# Patient Record
Sex: FEMALE | Race: WHITE | NOT HISPANIC OR LATINO | Employment: FULL TIME | URBAN - METROPOLITAN AREA
[De-identification: names, ages, dates, MRNs, and addresses within clinical notes are randomized per-mention and may not be internally consistent; named-entity substitution may affect disease eponyms.]

---

## 2017-07-24 ENCOUNTER — TRANSCRIBE ORDERS (OUTPATIENT)
Dept: ADMINISTRATIVE | Facility: HOSPITAL | Age: 37
End: 2017-07-24

## 2017-07-24 DIAGNOSIS — Z12.31 ENCOUNTER FOR MAMMOGRAM TO ESTABLISH BASELINE MAMMOGRAM: Primary | ICD-10-CM

## 2017-07-27 ENCOUNTER — HOSPITAL ENCOUNTER (OUTPATIENT)
Dept: MAMMOGRAPHY | Facility: HOSPITAL | Age: 37
Discharge: HOME | End: 2017-07-27
Payer: COMMERCIAL

## 2017-07-27 DIAGNOSIS — Z12.31 ENCOUNTER FOR MAMMOGRAM TO ESTABLISH BASELINE MAMMOGRAM: ICD-10-CM

## 2017-07-27 PROCEDURE — 77063 BREAST TOMOSYNTHESIS BI: CPT

## 2017-07-27 PROCEDURE — G0202 SCR MAMMO BI INCL CAD: HCPCS

## 2018-04-08 LAB
BACTERIA UR QL AUTO: ABNORMAL
BILIRUB UR QL STRIP: NEGATIVE
CLARITY UR: ABNORMAL
COLOR UR: YELLOW
GLUCOSE UR STRIP-MCNC: NEGATIVE MG/DL
HGB UR QL STRIP.AUTO: NEGATIVE
INFLUENZA A (VIRAL ID) (HISTORICAL): NEGATIVE
INFLUENZA B (VIRAL ID) (HISTORICAL): NEGATIVE
KETONES UR STRIP-MCNC: ABNORMAL MG/DL
LEUKOCYTE ESTERASE UR QL STRIP: NEGATIVE
MUCUS THREADS (HISTORICAL): PRESENT /HPF
NITRITE UR QL STRIP: NEGATIVE
NON-SQ EPI CELLS URNS QL MICRO: ABNORMAL /HPF
PH UR STRIP.AUTO: 6 [PH] (ref 4.5–8)
PREGNANCY TEST URINE (HISTORICAL): NEGATIVE
PROT UR STRIP-MCNC: ABNORMAL MG/DL
RBC #/AREA URNS AUTO: ABNORMAL /HPF
S PYO AG THROAT QL: POSITIVE
SP GR UR STRIP.AUTO: <= 1.03 (ref 1–1.03)
SP GR UR STRIP.AUTO: >= 1.03 (ref 1–1.03)
UROBILINOGEN UR QL STRIP.AUTO: 1 EU/DL (ref 0.2–8)
WBC #/AREA URNS AUTO: ABNORMAL /HPF

## 2018-10-16 ENCOUNTER — TRANSCRIBE ORDERS (OUTPATIENT)
Dept: LAB | Facility: MEDICAL CENTER | Age: 38
End: 2018-10-16

## 2018-10-16 ENCOUNTER — APPOINTMENT (OUTPATIENT)
Dept: LAB | Facility: MEDICAL CENTER | Age: 38
End: 2018-10-16
Payer: COMMERCIAL

## 2018-10-16 DIAGNOSIS — R31.9 URINARY TRACT INFECTION WITH HEMATURIA, SITE UNSPECIFIED: Primary | ICD-10-CM

## 2018-10-16 DIAGNOSIS — N39.0 URINARY TRACT INFECTION WITH HEMATURIA, SITE UNSPECIFIED: Primary | ICD-10-CM

## 2018-10-16 DIAGNOSIS — R31.9 URINARY TRACT INFECTION WITH HEMATURIA, SITE UNSPECIFIED: ICD-10-CM

## 2018-10-16 DIAGNOSIS — L30.9 DERMATITIS: ICD-10-CM

## 2018-10-16 DIAGNOSIS — N39.0 URINARY TRACT INFECTION WITH HEMATURIA, SITE UNSPECIFIED: ICD-10-CM

## 2018-10-16 PROCEDURE — 87086 URINE CULTURE/COLONY COUNT: CPT

## 2018-10-17 LAB — BACTERIA UR CULT: NORMAL

## 2018-10-26 ENCOUNTER — TRANSCRIBE ORDERS (OUTPATIENT)
Dept: ADMINISTRATIVE | Facility: HOSPITAL | Age: 38
End: 2018-10-26

## 2018-10-26 DIAGNOSIS — Z12.39 SCREENING BREAST EXAMINATION: Primary | ICD-10-CM

## 2018-10-31 ENCOUNTER — HOSPITAL ENCOUNTER (OUTPATIENT)
Dept: MAMMOGRAPHY | Facility: HOSPITAL | Age: 38
Discharge: HOME | End: 2018-10-31
Payer: COMMERCIAL

## 2018-10-31 DIAGNOSIS — Z12.39 SCREENING BREAST EXAMINATION: ICD-10-CM

## 2018-10-31 PROCEDURE — 77063 BREAST TOMOSYNTHESIS BI: CPT

## 2018-10-31 PROCEDURE — 77067 SCR MAMMO BI INCL CAD: CPT

## 2018-12-14 ENCOUNTER — OFFICE VISIT (OUTPATIENT)
Dept: URGENT CARE | Facility: CLINIC | Age: 38
End: 2018-12-14
Payer: COMMERCIAL

## 2018-12-14 VITALS
OXYGEN SATURATION: 99 % | HEART RATE: 88 BPM | SYSTOLIC BLOOD PRESSURE: 118 MMHG | RESPIRATION RATE: 20 BRPM | HEIGHT: 60 IN | DIASTOLIC BLOOD PRESSURE: 76 MMHG | TEMPERATURE: 99.1 F | WEIGHT: 172 LBS | BODY MASS INDEX: 33.77 KG/M2

## 2018-12-14 DIAGNOSIS — M62.838 MUSCLE SPASM: ICD-10-CM

## 2018-12-14 DIAGNOSIS — J01.90 ACUTE NON-RECURRENT SINUSITIS, UNSPECIFIED LOCATION: Primary | ICD-10-CM

## 2018-12-14 PROCEDURE — 99203 OFFICE O/P NEW LOW 30 MIN: CPT | Performed by: PHYSICIAN ASSISTANT

## 2018-12-14 PROCEDURE — S9088 SERVICES PROVIDED IN URGENT: HCPCS | Performed by: PHYSICIAN ASSISTANT

## 2018-12-14 RX ORDER — AMOXICILLIN AND CLAVULANATE POTASSIUM 875; 125 MG/1; MG/1
1 TABLET, FILM COATED ORAL EVERY 12 HOURS SCHEDULED
Qty: 20 TABLET | Refills: 0 | Status: SHIPPED | OUTPATIENT
Start: 2018-12-14 | End: 2018-12-24

## 2018-12-14 RX ORDER — FLUCONAZOLE 150 MG/1
150 TABLET ORAL ONCE
Qty: 1 TABLET | Refills: 0 | Status: SHIPPED | OUTPATIENT
Start: 2018-12-14 | End: 2018-12-14

## 2018-12-14 RX ORDER — CYCLOBENZAPRINE HCL 10 MG
10 TABLET ORAL 3 TIMES DAILY PRN
Qty: 20 TABLET | Refills: 0 | Status: SHIPPED | OUTPATIENT
Start: 2018-12-14 | End: 2022-01-31 | Stop reason: ALTCHOICE

## 2018-12-14 RX ORDER — LISINOPRIL AND HYDROCHLOROTHIAZIDE 12.5; 1 MG/1; MG/1
1 TABLET ORAL DAILY
Status: ON HOLD | COMMUNITY
End: 2021-07-16 | Stop reason: ALTCHOICE

## 2018-12-14 NOTE — PATIENT INSTRUCTIONS
Muscle Spasm   WHAT YOU NEED TO KNOW:   A muscle spasm is a sudden contraction of any muscle or group of muscles  A muscle cramp is a painful muscle spasm  Muscle cramps commonly occur after intense exercise or during pregnancy  They may also be caused by certain medications, dehydration, low calcium or magnesium levels, or another medical condition  DISCHARGE INSTRUCTIONS:   Medicines: You may need the following:  · NSAIDs  help decrease swelling and pain or fever  This medicine is available with or without a doctor's order  NSAIDs can cause stomach bleeding or kidney problems in certain people  If you take blood thinner medicine, always ask your healthcare provider if NSAIDs are safe for you  Always read the medicine label and follow directions  · Take your medicine as directed  Contact your healthcare provider if you think your medicine is not helping or if you have side effects  Tell him of her if you are allergic to any medicine  Keep a list of the medicines, vitamins, and herbs you take  Include the amounts, and when and why you take them  Bring the list or the pill bottles to follow-up visits  Carry your medicine list with you in case of an emergency  Follow up with your healthcare provider as directed: You may need other tests or treatment  You may also be referred to a physical therapist or other specialist  Write down your questions so you remember to ask them during your visits  Self-care:   · Stretch  your muscle to help relieve the cramp  It may be helpful to keep your muscle in the stretched position until the cramp is gone  · Apply heat  to help decrease pain and muscle spasms  Apply heat on the area for 20 to 30 minutes every 2 hours for as many days as directed  · Apply ice  to help decrease swelling and pain  Ice may also help prevent tissue damage  Use an ice pack, or put crushed ice in a plastic bag   Cover it with a towel and place it on your muscle for 15 to 20 minutes every hour or as directed  · Drink more liquids  to help prevent muscle cramps caused by dehydration  Sports drinks may help replace electrolytes you lose through sweat during exercise  Ask your healthcare provider how much liquid to drink each day and which liquids are best for you  · Eat healthy foods , such as fruits, vegetables, whole grains, low-fat dairy products, and lean proteins (meat, beans, and fish)  If you are pregnant, ask your healthcare provider about foods that are high in magnesium and sodium  They may help to relieve cramps during pregnancy  · Massage your muscle  to help relieve the cramp  · Take frequent deep breaths  until the cramp feels better  Lie down while you take the deep breaths so you do not get dizzy or lightheaded  Contact your healthcare provider if:   · You have signs of dehydration, such as a headache, dark yellow urine, dry eyes or mouth, or a fast heartbeat  · You have questions or concerns about your condition or care  Return to the emergency department if:   · You have warmth, swelling, or redness in the cramping muscle  · You have frequent or unrelieved muscle cramps in several different muscles  · You have muscle cramps with numbness, tingling, and burning in your hands and feet  © 2017 [address] Information is for End User's use only and may not be sold, redistributed or otherwise used for commercial purposes  All illustrations and images included in CareNotes® are the copyrighted property of [employer]  or [employer]  The above information is an  only  It is not intended as medical advice for individual conditions or treatments  Talk to your doctor, nurse or pharmacist before following any medical regimen to see if it is safe and effective for you  Sinusitis   AMBULATORY CARE:   Sinusitis  is inflammation or infection of your sinuses  It is most often caused by a virus   Acute sinusitis may last up to 12 weeks  Chronic sinusitis lasts longer than 12 weeks  Recurrent sinusitis means you have 4 or more times in 1 year  Common symptoms include the following:   · Fever    · Pain, pressure, redness, or swelling around the forehead, cheeks, or eyes    · Thick yellow or green discharge from your nose    · Tenderness when you touch your face over your sinuses    · Dry cough that happens mostly at night or when you lie down    · Headache and face pain that is worse when you lean forward    · Tooth pain, or pain when you chew  Seek care immediately if:   · Your eye and eyelid are red, swollen, and painful  · You cannot open your eye  · You have vision changes, such as double vision  · Your eyeball bulges out or you cannot move your eye  · You are more sleepy than normal, or you notice changes in your ability to think, move, or talk  · You have a stiff neck, a fever, or a bad headache  · You have swelling of your forehead or scalp  Contact your healthcare provider if:   · Your symptoms do not improve after 3 days  · Your symptoms do not go away after 10 days  · You have nausea and are vomiting  · Your nose is bleeding  · You have questions or concerns about your condition or care  Treatment for sinusitis:  Your symptoms may go away on their own  Your healthcare provider may recommend watchful waiting for up to 10 days before starting antibiotics  You may  need any of the following:  · Acetaminophen  decreases pain and fever  It is available without a doctor's order  Ask how much to take and how often to take it  Follow directions  Read the labels of all other medicines you are using to see if they also contain acetaminophen, or ask your doctor or pharmacist  Acetaminophen can cause liver damage if not taken correctly  Do not use more than 4 grams (4,000 milligrams) total of acetaminophen in one day  · NSAIDs , such as ibuprofen, help decrease swelling, pain, and fever   This medicine is available with or without a doctor's order  NSAIDs can cause stomach bleeding or kidney problems in certain people  If you take blood thinner medicine, always ask your healthcare provider if NSAIDs are safe for you  Always read the medicine label and follow directions  · Nasal steroid sprays  may help decrease inflammation in your nose and sinuses  · Decongestants  help reduce swelling and drain mucus in the nose and sinuses  They may help you breathe easier  · Antihistamines  help dry mucus in the nose and relieve sneezing  · Antibiotics  help treat or prevent a bacterial infection  · Take your medicine as directed  Contact your healthcare provider if you think your medicine is not helping or if you have side effects  Tell him or her if you are allergic to any medicine  Keep a list of the medicines, vitamins, and herbs you take  Include the amounts, and when and why you take them  Bring the list or the pill bottles to follow-up visits  Carry your medicine list with you in case of an emergency  Self-care:   · Rinse your sinuses  Use a sinus rinse device to rinse your nasal passages with a saline (salt water) solution or distilled water  Do not use tap water  This will help thin the mucus in your nose and rinse away pollen and dirt  It will also help reduce swelling so you can breathe normally  Ask your healthcare provider how often to do this  · Breathe in steam   Heat a bowl of water until you see steam  Lean over the bowl and make a tent over your head with a large towel  Breathe deeply for about 20 minutes  Be careful not to get too close to the steam or burn yourself  Do this 3 times a day  You can also breathe deeply when you take a hot shower  · Sleep with your head elevated  Place an extra pillow under your head before you go to sleep to help your sinuses drain  · Drink liquids as directed    Ask your healthcare provider how much liquid to drink each day and which liquids are best for you  Liquids will thin the mucus in your nose and help it drain  Avoid drinks that contain alcohol or caffeine  · Do not smoke, and avoid secondhand smoke  Nicotine and other chemicals in cigarettes and cigars can make your symptoms worse  Ask your healthcare provider for information if you currently smoke and need help to quit  E-cigarettes or smokeless tobacco still contain nicotine  Talk to your healthcare provider before you use these products  Prevent the spread of germs that cause sinusitis:  Wash your hands often with soap and water  Wash your hands after you use the bathroom, change a child's diaper, or sneeze  Wash your hands before you prepare or eat food  Follow up with your healthcare provider as directed: You may be referred to an ear, nose, and throat specialist  Write down your questions so you remember to ask them during your visits  © 2017 [address] [facility] Information is for End User's use only and may not be sold, redistributed or otherwise used for commercial purposes  All illustrations and images included in CareNotes® are the copyrighted property of [employer]  or [employer]  The above information is an  only  It is not intended as medical advice for individual conditions or treatments  Talk to your doctor, nurse or pharmacist before following any medical regimen to see if it is safe and effective for you
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
## 2018-12-29 ENCOUNTER — OFFICE VISIT (OUTPATIENT)
Dept: URGENT CARE | Facility: CLINIC | Age: 38
End: 2018-12-29
Payer: COMMERCIAL

## 2018-12-29 VITALS
HEART RATE: 108 BPM | SYSTOLIC BLOOD PRESSURE: 139 MMHG | DIASTOLIC BLOOD PRESSURE: 89 MMHG | OXYGEN SATURATION: 98 % | TEMPERATURE: 97 F | RESPIRATION RATE: 16 BRPM

## 2018-12-29 DIAGNOSIS — J06.9 VIRAL URI: Primary | ICD-10-CM

## 2018-12-29 DIAGNOSIS — J02.9 SORE THROAT: ICD-10-CM

## 2018-12-29 LAB — S PYO AG THROAT QL: NEGATIVE

## 2018-12-29 PROCEDURE — 87430 STREP A AG IA: CPT | Performed by: PHYSICIAN ASSISTANT

## 2018-12-29 PROCEDURE — 87070 CULTURE OTHR SPECIMN AEROBIC: CPT | Performed by: PHYSICIAN ASSISTANT

## 2018-12-29 PROCEDURE — 99213 OFFICE O/P EST LOW 20 MIN: CPT | Performed by: PHYSICIAN ASSISTANT

## 2018-12-29 PROCEDURE — S9088 SERVICES PROVIDED IN URGENT: HCPCS | Performed by: PHYSICIAN ASSISTANT

## 2018-12-29 NOTE — PROGRESS NOTES
[facility]    NAME: Kevyn Murphy is a 34 y o  female  : 1980    MRN: [id number]  DATE: 2018  TIME: 9:49 AM    Assessment and Plan   Viral URI [J06 9]  1  Viral URI     2  Sore throat  POCT rapid strepA    Throat culture       Patient Instructions   Patient Instructions   Infection appears viral   Recommend symptomatic treatment  Can take ibuprofen or tylenol as needed for pain or fever  Over the counter cough and cold medications to help with symptoms  Use salt water gargles for sore throat and throat lozenges  Cough drops as needed  Wash hands frequently to prevent the spread of infection  If not improving over the next 7-10 days, follow up with PCP  Symptoms may persist for 10-14 days  Rapid strep was negative  Will send out throat culture for further testing  If positive will treat  Chief Complaint     Chief Complaint   Patient presents with    Cold Like Symptoms       History of Present Illness   66-year-old female here with complaint of sore throat  Patient started with cold symptoms 3-4 days ago  Reports with a little bit of nasal congestion cough  Has some pain her chest the area  Had a fever initially which has subsided with symptomatic treatment  Cough is productive  Started with neck pain yesterday as well in the posterior aspect of her neck  Review of Systems   Review of Systems   Constitutional: Positive for fever  Negative for activity change, appetite change, chills, diaphoresis, fatigue and unexpected weight change  HENT: Positive for sore throat  Negative for congestion, dental problem, hearing loss, sinus pressure, sneezing, tinnitus, trouble swallowing and voice change  Eyes: Negative for photophobia, redness and visual disturbance  Respiratory: Positive for cough  Negative for apnea, chest tightness, shortness of breath, wheezing and stridor  Cardiovascular: Negative for chest pain, palpitations and leg swelling  Gastrointestinal: Negative for abdominal distention, abdominal pain, blood in stool, constipation, diarrhea, nausea and vomiting  Endocrine: Negative for cold intolerance, heat intolerance, polydipsia, polyphagia and polyuria  Genitourinary: Negative for difficulty urinating, dysuria, flank pain, frequency, hematuria and urgency  Musculoskeletal: Positive for neck pain  Negative for arthralgias, back pain, gait problem, joint swelling, myalgias and neck stiffness  Skin: Negative for pallor, rash and wound  Neurological: Negative for dizziness, tremors, seizures, speech difficulty, weakness and headaches  Hematological: Negative for adenopathy  Does not bruise/bleed easily  Psychiatric/Behavioral: Negative for agitation, confusion, dysphoric mood and sleep disturbance  The patient is not nervous/anxious  All other systems reviewed and are negative  Current Medications     Current Outpatient Prescriptions:     cyclobenzaprine (FLEXERIL) 10 mg tablet, Take 1 tablet (10 mg total) by mouth 3 (three) times a day as needed for muscle spasms, Disp: 20 tablet, Rfl: 0    lisinopril-hydrochlorothiazide (PRINZIDE,ZESTORETIC) 10-12 5 MG per tablet, Take 1 tablet by mouth daily, Disp: , Rfl:     norgestrel-ethinyl estradiol (LO/OVRAL) 0 3 mg-30 mcg per tablet, Take 1 tablet by mouth daily, Disp: , Rfl:     Current Allergies     Allergies as of 12/29/2018    (No Known Allergies)          The following portions of the patient's history were reviewed and updated as appropriate: allergies, current medications, past family history, past medical history, past social history, past surgical history and problem list    Past Medical History:   Diagnosis Date    Hypertension      Past Surgical History:   Procedure Laterality Date    CHOLECYSTECTOMY      TONSILLECTOMY       No family history on file    Social History     Social History    Marital status: /Civil Union     Spouse name: N/A    Number of children: N/A    Years of education: N/A     Occupational History    Not on file  Social History Main Topics    Smoking status: Never Smoker    Smokeless tobacco: Never Used    Alcohol use Not on file    Drug use: Unknown    Sexual activity: Not on file     Other Topics Concern    Not on file     Social History Narrative    No narrative on file     Medications have been verified  Objective   /89   Pulse (!) 108   Temp (!) 97 °F (36 1 °C)   Resp 16   SpO2 98%      Physical Exam   Physical Exam   Constitutional: She appears well-developed and well-nourished  No distress  HENT:   Head: Normocephalic  Right Ear: Tympanic membrane and external ear normal    Left Ear: Tympanic membrane and external ear normal    Nose: Mucosal edema present  Mouth/Throat: Posterior oropharyngeal erythema present  No oropharyngeal exudate  Neck: Normal range of motion  Neck supple  Muscular tenderness present  No neck rigidity  Normal range of motion present  Mild tenderness with palpation of insertion of trapezius at occiput  Cardiovascular: Normal rate, regular rhythm and normal heart sounds  No murmur heard  Pulmonary/Chest: Effort normal and breath sounds normal  No respiratory distress  She has no wheezes  She has no rales  Abdominal: Soft  Bowel sounds are normal  There is no tenderness  Musculoskeletal: Normal range of motion  Lymphadenopathy:     She has no cervical adenopathy  Skin: Skin is warm  No rash noted  Nursing note and vitals reviewed

## 2018-12-29 NOTE — PATIENT INSTRUCTIONS
Infection appears viral   Recommend symptomatic treatment  Can take ibuprofen or tylenol as needed for pain or fever  Over the counter cough and cold medications to help with symptoms  Use salt water gargles for sore throat and throat lozenges  Cough drops as needed  Wash hands frequently to prevent the spread of infection  If not improving over the next 7-10 days, follow up with PCP  Symptoms may persist for 10-14 days  Rapid strep was negative  Will send out throat culture for further testing  If positive will treat

## 2018-12-31 LAB — BACTERIA THROAT CULT: NORMAL

## 2019-04-12 ENCOUNTER — APPOINTMENT (OUTPATIENT)
Dept: LAB | Facility: MEDICAL CENTER | Age: 39
End: 2019-04-12
Payer: COMMERCIAL

## 2019-04-12 ENCOUNTER — TRANSCRIBE ORDERS (OUTPATIENT)
Dept: LAB | Facility: MEDICAL CENTER | Age: 39
End: 2019-04-12

## 2019-04-12 DIAGNOSIS — M19.90 ARTHRITIS: ICD-10-CM

## 2019-04-12 DIAGNOSIS — M89.9 BONE DISEASE: ICD-10-CM

## 2019-04-12 DIAGNOSIS — G44.59 OTHER COMPLICATED HEADACHE SYNDROME: ICD-10-CM

## 2019-04-12 DIAGNOSIS — R35.0 URINE FREQUENCY: ICD-10-CM

## 2019-04-12 DIAGNOSIS — R53.83 OTHER FATIGUE: ICD-10-CM

## 2019-04-12 DIAGNOSIS — I10 HYPERTENSION, UNSPECIFIED TYPE: ICD-10-CM

## 2019-04-12 DIAGNOSIS — M19.90 ARTHRITIS: Primary | ICD-10-CM

## 2019-04-12 LAB
25(OH)D3 SERPL-MCNC: 16.1 NG/ML (ref 30–100)
ALBUMIN SERPL BCP-MCNC: 4 G/DL (ref 3.5–5)
ALP SERPL-CCNC: 54 U/L (ref 46–116)
ALT SERPL W P-5'-P-CCNC: 14 U/L (ref 12–78)
ANION GAP SERPL CALCULATED.3IONS-SCNC: 5 MMOL/L (ref 4–13)
AST SERPL W P-5'-P-CCNC: 13 U/L (ref 5–45)
BACTERIA UR QL AUTO: NORMAL /HPF
BILIRUB SERPL-MCNC: 0.5 MG/DL (ref 0.2–1)
BILIRUB UR QL STRIP: NEGATIVE
BUN SERPL-MCNC: 13 MG/DL (ref 5–25)
CALCIUM SERPL-MCNC: 8.7 MG/DL (ref 8.3–10.1)
CHLORIDE SERPL-SCNC: 108 MMOL/L (ref 100–108)
CHOLEST SERPL-MCNC: 213 MG/DL (ref 50–200)
CLARITY UR: CLEAR
CO2 SERPL-SCNC: 27 MMOL/L (ref 21–32)
COLOR UR: YELLOW
CREAT SERPL-MCNC: 0.78 MG/DL (ref 0.6–1.3)
CRP SERPL QL: 4.7 MG/L
ERYTHROCYTE [DISTWIDTH] IN BLOOD BY AUTOMATED COUNT: 12.6 % (ref 11.6–15.1)
ERYTHROCYTE [SEDIMENTATION RATE] IN BLOOD: 11 MM/HOUR (ref 0–20)
GFR SERPL CREATININE-BSD FRML MDRD: 95 ML/MIN/1.73SQ M
GLUCOSE P FAST SERPL-MCNC: 95 MG/DL (ref 65–99)
GLUCOSE UR STRIP-MCNC: NEGATIVE MG/DL
HCT VFR BLD AUTO: 46.7 % (ref 34.8–46.1)
HDLC SERPL-MCNC: 53 MG/DL (ref 40–60)
HGB BLD-MCNC: 14.9 G/DL (ref 11.5–15.4)
HGB UR QL STRIP.AUTO: NEGATIVE
HYALINE CASTS #/AREA URNS LPF: NORMAL /LPF
KETONES UR STRIP-MCNC: NEGATIVE MG/DL
LDLC SERPL CALC-MCNC: 132 MG/DL (ref 0–100)
LEUKOCYTE ESTERASE UR QL STRIP: NEGATIVE
MCH RBC QN AUTO: 27.9 PG (ref 26.8–34.3)
MCHC RBC AUTO-ENTMCNC: 31.9 G/DL (ref 31.4–37.4)
MCV RBC AUTO: 88 FL (ref 82–98)
NITRITE UR QL STRIP: NEGATIVE
NON-SQ EPI CELLS URNS QL MICRO: NORMAL /HPF
NONHDLC SERPL-MCNC: 160 MG/DL
PH UR STRIP.AUTO: 6 [PH]
PLATELET # BLD AUTO: 368 THOUSANDS/UL (ref 149–390)
PMV BLD AUTO: 9.6 FL (ref 8.9–12.7)
POTASSIUM SERPL-SCNC: 4.3 MMOL/L (ref 3.5–5.3)
PROT SERPL-MCNC: 7.8 G/DL (ref 6.4–8.2)
PROT UR STRIP-MCNC: NEGATIVE MG/DL
RBC # BLD AUTO: 5.34 MILLION/UL (ref 3.81–5.12)
RBC #/AREA URNS AUTO: NORMAL /HPF
SODIUM SERPL-SCNC: 140 MMOL/L (ref 136–145)
SP GR UR STRIP.AUTO: 1.03 (ref 1–1.03)
T4 FREE SERPL-MCNC: 0.94 NG/DL (ref 0.76–1.46)
TRIGL SERPL-MCNC: 141 MG/DL
TSH SERPL DL<=0.05 MIU/L-ACNC: 1.78 UIU/ML (ref 0.36–3.74)
UROBILINOGEN UR QL STRIP.AUTO: 0.2 E.U./DL
WBC # BLD AUTO: 6.07 THOUSAND/UL (ref 4.31–10.16)
WBC #/AREA URNS AUTO: NORMAL /HPF

## 2019-04-12 PROCEDURE — 85027 COMPLETE CBC AUTOMATED: CPT

## 2019-04-12 PROCEDURE — 86038 ANTINUCLEAR ANTIBODIES: CPT

## 2019-04-12 PROCEDURE — 36415 COLL VENOUS BLD VENIPUNCTURE: CPT

## 2019-04-12 PROCEDURE — 86618 LYME DISEASE ANTIBODY: CPT

## 2019-04-12 PROCEDURE — 80061 LIPID PANEL: CPT

## 2019-04-12 PROCEDURE — 82306 VITAMIN D 25 HYDROXY: CPT

## 2019-04-12 PROCEDURE — 85652 RBC SED RATE AUTOMATED: CPT

## 2019-04-12 PROCEDURE — 86430 RHEUMATOID FACTOR TEST QUAL: CPT

## 2019-04-12 PROCEDURE — 84443 ASSAY THYROID STIM HORMONE: CPT

## 2019-04-12 PROCEDURE — 87086 URINE CULTURE/COLONY COUNT: CPT

## 2019-04-12 PROCEDURE — 86140 C-REACTIVE PROTEIN: CPT

## 2019-04-12 PROCEDURE — 84439 ASSAY OF FREE THYROXINE: CPT

## 2019-04-12 PROCEDURE — 80053 COMPREHEN METABOLIC PANEL: CPT

## 2019-04-12 PROCEDURE — 81001 URINALYSIS AUTO W/SCOPE: CPT

## 2019-04-13 LAB — RHEUMATOID FACT SER QL LA: NEGATIVE

## 2019-04-14 LAB
B BURGDOR IGG SER IA-ACNC: 0.08
B BURGDOR IGM SER IA-ACNC: 0.18
BACTERIA UR CULT: NORMAL

## 2019-04-15 LAB — RYE IGE QN: NEGATIVE

## 2019-09-18 ENCOUNTER — OFFICE VISIT (OUTPATIENT)
Dept: URGENT CARE | Facility: CLINIC | Age: 39
End: 2019-09-18
Payer: COMMERCIAL

## 2019-09-18 VITALS
RESPIRATION RATE: 18 BRPM | TEMPERATURE: 99.8 F | DIASTOLIC BLOOD PRESSURE: 93 MMHG | HEART RATE: 84 BPM | OXYGEN SATURATION: 97 % | SYSTOLIC BLOOD PRESSURE: 170 MMHG

## 2019-09-18 DIAGNOSIS — J01.40 ACUTE PANSINUSITIS, RECURRENCE NOT SPECIFIED: Primary | ICD-10-CM

## 2019-09-18 PROCEDURE — 99213 OFFICE O/P EST LOW 20 MIN: CPT | Performed by: NURSE PRACTITIONER

## 2019-09-18 PROCEDURE — S9088 SERVICES PROVIDED IN URGENT: HCPCS | Performed by: NURSE PRACTITIONER

## 2019-09-18 RX ORDER — AMOXICILLIN AND CLAVULANATE POTASSIUM 875; 125 MG/1; MG/1
1 TABLET, FILM COATED ORAL 2 TIMES DAILY
Qty: 14 TABLET | Refills: 0 | Status: SHIPPED | OUTPATIENT
Start: 2019-09-18 | End: 2019-09-25

## 2019-09-18 RX ORDER — LISINOPRIL 10 MG/1
20 TABLET ORAL DAILY
COMMUNITY
End: 2021-12-23

## 2019-09-18 RX ORDER — PREDNISONE 20 MG/1
20 TABLET ORAL 2 TIMES DAILY WITH MEALS
Qty: 10 TABLET | Refills: 0 | Status: SHIPPED | OUTPATIENT
Start: 2019-09-18 | End: 2019-09-23

## 2019-09-18 NOTE — PROGRESS NOTES
[facility]        NAME: Marta Patricio is a 34 y o  female  : 1980    MRN: [id number]  DATE: 2019  TIME: 3:24 PM    Assessment and Plan   Acute pansinusitis, recurrence not specified [J01 40]  1  Acute pansinusitis, recurrence not specified  amoxicillin-clavulanate (AUGMENTIN) 875-125 mg per tablet    predniSONE 20 mg tablet         Patient Instructions    Continue multi symptom cold medication as directed  Increase fluid intake  Follow up with PCP in 3-5 days  Proceed to  ER if symptoms worsen  Chief Complaint     Chief Complaint   Patient presents with    Sinus Problem     Pt c/o sinus congestion and also fluid in her ears  History of Present Illness       Sinus Problem   This is a new problem  Episode onset: Third day  The problem has been gradually worsening since onset  Associated symptoms include chills, congestion, ear pain (Right-sided ) and sinus pressure  Pertinent negatives include no coughing, shortness of breath, sore throat or swollen glands  Treatments tried: Multi symptom cold medicine  Review of Systems   Review of Systems   Constitutional: Positive for chills and fatigue  Negative for fever  HENT: Positive for congestion, ear pain (Right-sided ) and sinus pressure  Negative for sore throat  Respiratory: Negative for cough, chest tightness, shortness of breath and wheezing            Current Medications       Current Outpatient Medications:     lisinopril (ZESTRIL) 10 mg tablet, Take 10 mg by mouth daily, Disp: , Rfl:     amoxicillin-clavulanate (AUGMENTIN) 875-125 mg per tablet, Take 1 tablet by mouth 2 (two) times a day for 7 days, Disp: 14 tablet, Rfl: 0    cyclobenzaprine (FLEXERIL) 10 mg tablet, Take 1 tablet (10 mg total) by mouth 3 (three) times a day as needed for muscle spasms, Disp: 20 tablet, Rfl: 0    lisinopril-hydrochlorothiazide (PRINZIDE,ZESTORETIC) 10-12 5 MG per tablet, Take 1 tablet by mouth daily, Disp: , Rfl:    norgestrel-ethinyl estradiol (LO/OVRAL) 0 3 mg-30 mcg per tablet, Take 1 tablet by mouth daily, Disp: , Rfl:     predniSONE 20 mg tablet, Take 1 tablet (20 mg total) by mouth 2 (two) times a day with meals for 5 days, Disp: 10 tablet, Rfl: 0    Current Allergies     Allergies as of 09/18/2019    (No Known Allergies)            The following portions of the patient's history were reviewed and updated as appropriate: allergies, current medications, past family history, past medical history, past social history, past surgical history and problem list      Past Medical History:   Diagnosis Date    Hypertension        Past Surgical History:   Procedure Laterality Date    CHOLECYSTECTOMY      TONSILLECTOMY         History reviewed  No pertinent family history  Medications have been verified  Objective   /93   Pulse 84   Temp 99 8 °F (37 7 °C)   Resp 18   SpO2 97%        Physical Exam     Physical Exam   Constitutional: She is oriented to person, place, and time  She appears well-developed and well-nourished  No distress  HENT:   Right Ear: There is tenderness  Tympanic membrane is bulging  Tympanic membrane is not erythematous  A middle ear effusion is present  Left Ear: No tenderness  Tympanic membrane is not erythematous and not bulging  No middle ear effusion  Nose: Mucosal edema and rhinorrhea present  Right sinus exhibits maxillary sinus tenderness and frontal sinus tenderness  Left sinus exhibits maxillary sinus tenderness and frontal sinus tenderness  Mouth/Throat: Mucous membranes are normal  No oropharyngeal exudate, posterior oropharyngeal edema or posterior oropharyngeal erythema  Tonsils are 0 on the right  Tonsils are 0 on the left  No tonsillar exudate  Cardiovascular: Normal rate, regular rhythm and normal heart sounds  Pulmonary/Chest: Effort normal and breath sounds normal  No stridor  No respiratory distress  She has no wheezes  She has no rales     Neurological: She is alert and oriented to person, place, and time  Skin: Skin is warm and dry  She is not diaphoretic  Psychiatric: She has a normal mood and affect  Her behavior is normal  Judgment and thought content normal    Nursing note and vitals reviewed

## 2019-10-14 ENCOUNTER — TRANSCRIBE ORDERS (OUTPATIENT)
Dept: ADMINISTRATIVE | Facility: HOSPITAL | Age: 39
End: 2019-10-14

## 2019-10-14 DIAGNOSIS — Z12.31 ENCOUNTER FOR SCREENING MAMMOGRAM FOR MALIGNANT NEOPLASM OF BREAST: Primary | ICD-10-CM

## 2019-10-31 ENCOUNTER — APPOINTMENT (OUTPATIENT)
Dept: LAB | Facility: MEDICAL CENTER | Age: 39
End: 2019-10-31
Payer: COMMERCIAL

## 2019-10-31 ENCOUNTER — TRANSCRIBE ORDERS (OUTPATIENT)
Dept: LAB | Facility: MEDICAL CENTER | Age: 39
End: 2019-10-31

## 2019-10-31 DIAGNOSIS — N39.0 URINARY TRACT INFECTION WITHOUT HEMATURIA, SITE UNSPECIFIED: ICD-10-CM

## 2019-10-31 DIAGNOSIS — S03.00XA DISLOCATION OF TEMPOROMANDIBULAR JOINT, INITIAL ENCOUNTER: Primary | ICD-10-CM

## 2019-10-31 DIAGNOSIS — I10 HYPERTENSION, UNSPECIFIED TYPE: ICD-10-CM

## 2019-10-31 DIAGNOSIS — M54.2 NECK PAIN: ICD-10-CM

## 2019-10-31 PROCEDURE — 87086 URINE CULTURE/COLONY COUNT: CPT

## 2019-10-31 PROCEDURE — 87147 CULTURE TYPE IMMUNOLOGIC: CPT

## 2019-11-02 LAB
BACTERIA UR CULT: ABNORMAL
BACTERIA UR CULT: ABNORMAL

## 2019-11-09 ENCOUNTER — HOSPITAL ENCOUNTER (OUTPATIENT)
Dept: MAMMOGRAPHY | Facility: HOSPITAL | Age: 39
Discharge: HOME | End: 2019-11-09
Attending: SPECIALIST
Payer: COMMERCIAL

## 2019-11-09 VITALS — HEIGHT: 60 IN | WEIGHT: 185 LBS | BODY MASS INDEX: 36.32 KG/M2

## 2019-11-09 DIAGNOSIS — Z12.31 ENCOUNTER FOR SCREENING MAMMOGRAM FOR MALIGNANT NEOPLASM OF BREAST: ICD-10-CM

## 2019-11-09 PROCEDURE — 77063 BREAST TOMOSYNTHESIS BI: CPT

## 2019-11-09 PROCEDURE — 77067 SCR MAMMO BI INCL CAD: CPT

## 2020-10-12 ENCOUNTER — HOSPITAL ENCOUNTER (EMERGENCY)
Facility: HOSPITAL | Age: 40
Discharge: HOME | End: 2020-10-12
Attending: INTERNAL MEDICINE | Admitting: INTERNAL MEDICINE
Payer: COMMERCIAL

## 2020-10-12 ENCOUNTER — APPOINTMENT (EMERGENCY)
Dept: CT IMAGING | Facility: HOSPITAL | Age: 40
End: 2020-10-12
Payer: COMMERCIAL

## 2020-10-12 VITALS
RESPIRATION RATE: 18 BRPM | TEMPERATURE: 98.4 F | WEIGHT: 189 LBS | SYSTOLIC BLOOD PRESSURE: 159 MMHG | BODY MASS INDEX: 37.11 KG/M2 | OXYGEN SATURATION: 99 % | HEART RATE: 90 BPM | DIASTOLIC BLOOD PRESSURE: 94 MMHG | HEIGHT: 60 IN

## 2020-10-12 DIAGNOSIS — K63.89 APPENDICITIS EPIPLOICA: Primary | ICD-10-CM

## 2020-10-12 LAB
ALBUMIN SERPL BCP-MCNC: 4.6 G/DL (ref 3.5–5.7)
ALP SERPL-CCNC: 50 U/L (ref 40–150)
ALT SERPL W P-5'-P-CCNC: 8 U/L (ref 7–52)
ANION GAP SERPL CALCULATED.3IONS-SCNC: 8 MMOL/L (ref 4–13)
AST SERPL W P-5'-P-CCNC: 11 U/L (ref 13–39)
B-HCG SERPL-ACNC: <0.6 MIU/ML (ref 0–11.6)
BACTERIA UR QL AUTO: ABNORMAL /HPF
BASOPHILS # BLD AUTO: 0.1 THOUSANDS/ΜL (ref 0–0.1)
BASOPHILS NFR BLD AUTO: 1 % (ref 0–2)
BILIRUB SERPL-MCNC: 0.3 MG/DL (ref 0.2–1)
BILIRUB UR QL STRIP: NEGATIVE
BUN SERPL-MCNC: 15 MG/DL (ref 7–25)
CALCIUM SERPL-MCNC: 9.5 MG/DL (ref 8.6–10.5)
CHLORIDE SERPL-SCNC: 102 MMOL/L (ref 98–107)
CLARITY UR: CLEAR
CO2 SERPL-SCNC: 26 MMOL/L (ref 21–31)
COLOR UR: YELLOW
CREAT SERPL-MCNC: 0.74 MG/DL (ref 0.6–1.2)
EOSINOPHIL # BLD AUTO: 0.1 THOUSAND/ΜL (ref 0–0.61)
EOSINOPHIL NFR BLD AUTO: 1 % (ref 0–5)
ERYTHROCYTE [DISTWIDTH] IN BLOOD BY AUTOMATED COUNT: 13.6 % (ref 11.5–14.5)
GFR SERPL CREATININE-BSD FRML MDRD: 101 ML/MIN/1.73SQ M
GLUCOSE SERPL-MCNC: 92 MG/DL (ref 65–99)
GLUCOSE UR STRIP-MCNC: NEGATIVE MG/DL
HCT VFR BLD AUTO: 43.1 % (ref 42–47)
HGB BLD-MCNC: 14.6 G/DL (ref 12–16)
HGB UR QL STRIP.AUTO: ABNORMAL
KETONES UR STRIP-MCNC: NEGATIVE MG/DL
LEUKOCYTE ESTERASE UR QL STRIP: NEGATIVE
LIPASE SERPL-CCNC: 22 U/L (ref 11–82)
LYMPHOCYTES # BLD AUTO: 2.9 THOUSANDS/ΜL (ref 0.6–4.47)
LYMPHOCYTES NFR BLD AUTO: 23 % (ref 21–51)
MCH RBC QN AUTO: 28.7 PG (ref 26–34)
MCHC RBC AUTO-ENTMCNC: 33.7 G/DL (ref 31–37)
MCV RBC AUTO: 85 FL (ref 81–99)
MONOCYTES # BLD AUTO: 1 THOUSAND/ΜL (ref 0.17–1.22)
MONOCYTES NFR BLD AUTO: 8 % (ref 2–12)
MUCOUS THREADS UR QL AUTO: ABNORMAL
NEUTROPHILS # BLD AUTO: 8.8 THOUSANDS/ΜL (ref 1.4–6.5)
NEUTS SEG NFR BLD AUTO: 68 % (ref 42–75)
NITRITE UR QL STRIP: NEGATIVE
NON-SQ EPI CELLS URNS QL MICRO: ABNORMAL /HPF
PH UR STRIP.AUTO: 6 [PH]
PLATELET # BLD AUTO: 406 THOUSANDS/UL (ref 149–390)
PMV BLD AUTO: 7.6 FL (ref 8.6–11.7)
POTASSIUM SERPL-SCNC: 3.9 MMOL/L (ref 3.5–5.5)
PROT SERPL-MCNC: 7.2 G/DL (ref 6.4–8.9)
PROT UR STRIP-MCNC: NEGATIVE MG/DL
RBC # BLD AUTO: 5.07 MILLION/UL (ref 3.9–5.2)
RBC #/AREA URNS AUTO: ABNORMAL /HPF
SODIUM SERPL-SCNC: 136 MMOL/L (ref 134–143)
SP GR UR STRIP.AUTO: >=1.03 (ref 1–1.03)
UROBILINOGEN UR QL STRIP.AUTO: 0.2 E.U./DL
WBC # BLD AUTO: 12.9 THOUSAND/UL (ref 4.8–10.8)
WBC #/AREA URNS AUTO: ABNORMAL /HPF

## 2020-10-12 PROCEDURE — 36415 COLL VENOUS BLD VENIPUNCTURE: CPT | Performed by: INTERNAL MEDICINE

## 2020-10-12 PROCEDURE — G1004 CDSM NDSC: HCPCS

## 2020-10-12 PROCEDURE — 84702 CHORIONIC GONADOTROPIN TEST: CPT | Performed by: INTERNAL MEDICINE

## 2020-10-12 PROCEDURE — 81001 URINALYSIS AUTO W/SCOPE: CPT | Performed by: INTERNAL MEDICINE

## 2020-10-12 PROCEDURE — 99284 EMERGENCY DEPT VISIT MOD MDM: CPT

## 2020-10-12 PROCEDURE — 74177 CT ABD & PELVIS W/CONTRAST: CPT

## 2020-10-12 PROCEDURE — 85025 COMPLETE CBC W/AUTO DIFF WBC: CPT | Performed by: INTERNAL MEDICINE

## 2020-10-12 PROCEDURE — 80053 COMPREHEN METABOLIC PANEL: CPT | Performed by: INTERNAL MEDICINE

## 2020-10-12 PROCEDURE — 99284 EMERGENCY DEPT VISIT MOD MDM: CPT | Performed by: INTERNAL MEDICINE

## 2020-10-12 PROCEDURE — 83690 ASSAY OF LIPASE: CPT | Performed by: INTERNAL MEDICINE

## 2020-10-12 PROCEDURE — 96374 THER/PROPH/DIAG INJ IV PUSH: CPT

## 2020-10-12 RX ORDER — AMLODIPINE BESYLATE 5 MG/1
5 TABLET ORAL DAILY
COMMUNITY
End: 2022-01-11 | Stop reason: SDUPTHER

## 2020-10-12 RX ORDER — IBUPROFEN 800 MG/1
800 TABLET ORAL 3 TIMES DAILY
Qty: 21 TABLET | Refills: 0 | Status: SHIPPED | OUTPATIENT
Start: 2020-10-12 | End: 2022-01-31 | Stop reason: ALTCHOICE

## 2020-10-12 RX ORDER — OXYCODONE HYDROCHLORIDE AND ACETAMINOPHEN 5; 325 MG/1; MG/1
1 TABLET ORAL ONCE
Status: COMPLETED | OUTPATIENT
Start: 2020-10-12 | End: 2020-10-12

## 2020-10-12 RX ORDER — KETOROLAC TROMETHAMINE 30 MG/ML
30 INJECTION, SOLUTION INTRAMUSCULAR; INTRAVENOUS ONCE
Status: COMPLETED | OUTPATIENT
Start: 2020-10-12 | End: 2020-10-12

## 2020-10-12 RX ADMIN — OXYCODONE HYDROCHLORIDE AND ACETAMINOPHEN 1 TABLET: 5; 325 TABLET ORAL at 22:42

## 2020-10-12 RX ADMIN — IOHEXOL 100 ML: 350 INJECTION, SOLUTION INTRAVENOUS at 21:16

## 2020-10-12 RX ADMIN — IOHEXOL 50 ML: 240 INJECTION, SOLUTION INTRATHECAL; INTRAVASCULAR; INTRAVENOUS; ORAL at 21:16

## 2020-10-12 RX ADMIN — KETOROLAC TROMETHAMINE 30 MG: 30 INJECTION, SOLUTION INTRAMUSCULAR; INTRAVENOUS at 21:47

## 2020-11-19 ENCOUNTER — TRANSCRIBE ORDERS (OUTPATIENT)
Dept: ADMINISTRATIVE | Facility: HOSPITAL | Age: 40
End: 2020-11-19

## 2020-11-19 DIAGNOSIS — R19.7 DIARRHEA, UNSPECIFIED TYPE: Primary | ICD-10-CM

## 2020-11-19 DIAGNOSIS — R10.31 RLQ ABDOMINAL PAIN: ICD-10-CM

## 2020-11-27 ENCOUNTER — HOSPITAL ENCOUNTER (OUTPATIENT)
Dept: RADIOLOGY | Facility: HOSPITAL | Age: 40
Discharge: HOME | End: 2020-11-27
Attending: INTERNAL MEDICINE
Payer: COMMERCIAL

## 2020-11-27 DIAGNOSIS — R10.31 RLQ ABDOMINAL PAIN: ICD-10-CM

## 2020-11-27 DIAGNOSIS — R19.7 DIARRHEA, UNSPECIFIED TYPE: ICD-10-CM

## 2020-11-27 PROCEDURE — 74250 X-RAY XM SM INT 1CNTRST STD: CPT

## 2021-01-13 DIAGNOSIS — H92.01 EAR PAIN, RIGHT: ICD-10-CM

## 2021-01-13 DIAGNOSIS — R09.81 HEAD CONGESTION: ICD-10-CM

## 2021-01-13 PROCEDURE — U0003 INFECTIOUS AGENT DETECTION BY NUCLEIC ACID (DNA OR RNA); SEVERE ACUTE RESPIRATORY SYNDROME CORONAVIRUS 2 (SARS-COV-2) (CORONAVIRUS DISEASE [COVID-19]), AMPLIFIED PROBE TECHNIQUE, MAKING USE OF HIGH THROUGHPUT TECHNOLOGIES AS DESCRIBED BY CMS-2020-01-R: HCPCS | Performed by: NURSE PRACTITIONER

## 2021-01-13 PROCEDURE — U0005 INFEC AGEN DETEC AMPLI PROBE: HCPCS | Performed by: NURSE PRACTITIONER

## 2021-01-14 LAB — SARS-COV-2 RNA SPEC QL NAA+PROBE: NOT DETECTED

## 2021-01-15 DIAGNOSIS — Z20.828 EXPOSURE TO SARS-ASSOCIATED CORONAVIRUS: ICD-10-CM

## 2021-01-15 PROCEDURE — U0003 INFECTIOUS AGENT DETECTION BY NUCLEIC ACID (DNA OR RNA); SEVERE ACUTE RESPIRATORY SYNDROME CORONAVIRUS 2 (SARS-COV-2) (CORONAVIRUS DISEASE [COVID-19]), AMPLIFIED PROBE TECHNIQUE, MAKING USE OF HIGH THROUGHPUT TECHNOLOGIES AS DESCRIBED BY CMS-2020-01-R: HCPCS | Performed by: NURSE PRACTITIONER

## 2021-01-15 PROCEDURE — U0005 INFEC AGEN DETEC AMPLI PROBE: HCPCS | Performed by: NURSE PRACTITIONER

## 2021-01-16 LAB — SARS-COV-2 RNA SPEC QL NAA+PROBE: NOT DETECTED

## 2021-02-05 ENCOUNTER — TRANSCRIBE ORDERS (OUTPATIENT)
Dept: LAB | Facility: HOSPITAL | Age: 41
End: 2021-02-05

## 2021-02-05 ENCOUNTER — LAB (OUTPATIENT)
Dept: LAB | Facility: HOSPITAL | Age: 41
End: 2021-02-05
Payer: COMMERCIAL

## 2021-02-05 DIAGNOSIS — K63.3 ULCERATION OF INTESTINE: ICD-10-CM

## 2021-02-05 DIAGNOSIS — R10.813 RIGHT LOWER QUADRANT ABDOMINAL TENDERNESS, REBOUND TENDERNESS PRESENCE NOT SPECIFIED: ICD-10-CM

## 2021-02-05 DIAGNOSIS — R19.7 DIARRHEA, UNSPECIFIED TYPE: ICD-10-CM

## 2021-02-05 DIAGNOSIS — R19.7 DIARRHEA, UNSPECIFIED TYPE: Primary | ICD-10-CM

## 2021-02-05 LAB
CRP SERPL QL: 14.1 MG/L
ERYTHROCYTE [SEDIMENTATION RATE] IN BLOOD: 16 MM/HOUR (ref 0–19)

## 2021-02-05 PROCEDURE — 86255 FLUORESCENT ANTIBODY SCREEN: CPT

## 2021-02-05 PROCEDURE — 85652 RBC SED RATE AUTOMATED: CPT

## 2021-02-05 PROCEDURE — 83516 IMMUNOASSAY NONANTIBODY: CPT

## 2021-02-05 PROCEDURE — 36415 COLL VENOUS BLD VENIPUNCTURE: CPT

## 2021-02-05 PROCEDURE — 82784 ASSAY IGA/IGD/IGG/IGM EACH: CPT

## 2021-02-05 PROCEDURE — 86140 C-REACTIVE PROTEIN: CPT

## 2021-02-06 LAB
ENDOMYSIUM IGA SER QL: NEGATIVE
GLIADIN PEPTIDE IGA SER-ACNC: 4 UNITS (ref 0–19)
GLIADIN PEPTIDE IGG SER-ACNC: 4 UNITS (ref 0–19)
IGA SERPL-MCNC: 200 MG/DL (ref 87–352)
TTG IGA SER-ACNC: <2 U/ML (ref 0–3)
TTG IGG SER-ACNC: 6 U/ML (ref 0–5)

## 2021-02-08 ENCOUNTER — LAB (OUTPATIENT)
Dept: LAB | Facility: HOSPITAL | Age: 41
End: 2021-02-08
Payer: COMMERCIAL

## 2021-02-08 PROCEDURE — 83993 ASSAY FOR CALPROTECTIN FECAL: CPT

## 2021-02-17 LAB — CALPROTECTIN STL-MCNT: 21 UG/G (ref 0–120)

## 2021-03-10 DIAGNOSIS — Z23 ENCOUNTER FOR IMMUNIZATION: ICD-10-CM

## 2021-06-18 ENCOUNTER — APPOINTMENT (EMERGENCY)
Dept: CT IMAGING | Facility: HOSPITAL | Age: 41
End: 2021-06-18
Payer: COMMERCIAL

## 2021-06-18 ENCOUNTER — ANESTHESIA (EMERGENCY)
Dept: PERIOP | Facility: HOSPITAL | Age: 41
End: 2021-06-18
Payer: COMMERCIAL

## 2021-06-18 ENCOUNTER — ANESTHESIA EVENT (EMERGENCY)
Dept: PERIOP | Facility: HOSPITAL | Age: 41
End: 2021-06-18
Payer: COMMERCIAL

## 2021-06-18 ENCOUNTER — APPOINTMENT (EMERGENCY)
Dept: RADIOLOGY | Facility: HOSPITAL | Age: 41
End: 2021-06-18
Payer: COMMERCIAL

## 2021-06-18 ENCOUNTER — ANESTHESIA (OUTPATIENT)
Dept: ANESTHESIOLOGY | Facility: HOSPITAL | Age: 41
End: 2021-06-18

## 2021-06-18 ENCOUNTER — HOSPITAL ENCOUNTER (EMERGENCY)
Facility: HOSPITAL | Age: 41
Discharge: HOME | End: 2021-06-18
Attending: EMERGENCY MEDICINE | Admitting: EMERGENCY MEDICINE
Payer: COMMERCIAL

## 2021-06-18 ENCOUNTER — ANESTHESIA EVENT (OUTPATIENT)
Dept: ANESTHESIOLOGY | Facility: HOSPITAL | Age: 41
End: 2021-06-18

## 2021-06-18 VITALS
RESPIRATION RATE: 20 BRPM | SYSTOLIC BLOOD PRESSURE: 122 MMHG | OXYGEN SATURATION: 98 % | DIASTOLIC BLOOD PRESSURE: 72 MMHG | BODY MASS INDEX: 36.91 KG/M2 | TEMPERATURE: 98.1 F | WEIGHT: 188 LBS | HEIGHT: 60 IN | HEART RATE: 111 BPM

## 2021-06-18 DIAGNOSIS — N20.0 KIDNEY STONE: Primary | ICD-10-CM

## 2021-06-18 PROBLEM — N20.1 OBSTRUCTION OF RIGHT URETEROPELVIC JUNCTION (UPJ) DUE TO STONE: Status: ACTIVE | Noted: 2021-06-18

## 2021-06-18 LAB
ALBUMIN SERPL BCP-MCNC: 4.3 G/DL (ref 3.5–5.7)
ALP SERPL-CCNC: 48 U/L (ref 40–150)
ALT SERPL W P-5'-P-CCNC: 16 U/L (ref 7–52)
ANION GAP SERPL CALCULATED.3IONS-SCNC: 10 MMOL/L (ref 4–13)
AST SERPL W P-5'-P-CCNC: 14 U/L (ref 13–39)
BACTERIA UR QL AUTO: ABNORMAL /HPF
BASOPHILS # BLD AUTO: 0 THOUSANDS/ΜL (ref 0–0.1)
BASOPHILS NFR BLD AUTO: 0 % (ref 0–2)
BILIRUB SERPL-MCNC: 0.5 MG/DL (ref 0.2–1)
BILIRUB UR QL STRIP: NEGATIVE
BILIRUB UR QL STRIP: NEGATIVE
BUN SERPL-MCNC: 16 MG/DL (ref 7–25)
CALCIUM SERPL-MCNC: 9.3 MG/DL (ref 8.6–10.5)
CHLORIDE SERPL-SCNC: 104 MMOL/L (ref 98–107)
CLARITY UR: CLEAR
CLARITY UR: CLEAR
CO2 SERPL-SCNC: 24 MMOL/L (ref 21–31)
COLOR UR: YELLOW
COLOR UR: YELLOW
CREAT SERPL-MCNC: 0.88 MG/DL (ref 0.6–1.2)
EOSINOPHIL # BLD AUTO: 0.1 THOUSAND/ΜL (ref 0–0.61)
EOSINOPHIL NFR BLD AUTO: 1 % (ref 0–5)
ERYTHROCYTE [DISTWIDTH] IN BLOOD BY AUTOMATED COUNT: 13.5 % (ref 11.5–14.5)
EXT PREG TEST URINE: NORMAL
EXT. CONTROL ED NAV: NORMAL
GFR SERPL CREATININE-BSD FRML MDRD: 81 ML/MIN/1.73SQ M
GLUCOSE SERPL-MCNC: 129 MG/DL (ref 65–99)
GLUCOSE UR STRIP-MCNC: NEGATIVE MG/DL
GLUCOSE UR STRIP-MCNC: NEGATIVE MG/DL
HCG SERPL QL: NEGATIVE
HCT VFR BLD AUTO: 42.5 % (ref 42–47)
HGB BLD-MCNC: 14.3 G/DL (ref 12–16)
HGB UR QL STRIP.AUTO: ABNORMAL
HGB UR QL STRIP.AUTO: NEGATIVE
KETONES UR STRIP-MCNC: ABNORMAL MG/DL
KETONES UR STRIP-MCNC: NEGATIVE MG/DL
LACTATE SERPL-SCNC: 1.3 MMOL/L (ref 0.5–2)
LEUKOCYTE ESTERASE UR QL STRIP: NEGATIVE
LEUKOCYTE ESTERASE UR QL STRIP: NEGATIVE
LIPASE SERPL-CCNC: 14 U/L (ref 11–82)
LYMPHOCYTES # BLD AUTO: 2.3 THOUSANDS/ΜL (ref 0.6–4.47)
LYMPHOCYTES NFR BLD AUTO: 23 % (ref 21–51)
MAGNESIUM SERPL-MCNC: 1.9 MG/DL (ref 1.9–2.7)
MCH RBC QN AUTO: 28.7 PG (ref 26–34)
MCHC RBC AUTO-ENTMCNC: 33.6 G/DL (ref 31–37)
MCV RBC AUTO: 86 FL (ref 81–99)
MONOCYTES # BLD AUTO: 0.8 THOUSAND/ΜL (ref 0.17–1.22)
MONOCYTES NFR BLD AUTO: 8 % (ref 2–12)
NEUTROPHILS # BLD AUTO: 6.9 THOUSANDS/ΜL (ref 1.4–6.5)
NEUTS SEG NFR BLD AUTO: 68 % (ref 42–75)
NITRITE UR QL STRIP: NEGATIVE
NITRITE UR QL STRIP: NEGATIVE
NON-SQ EPI CELLS URNS QL MICRO: ABNORMAL /HPF
PH UR STRIP.AUTO: 6.5 [PH]
PH UR STRIP.AUTO: 6.5 [PH]
PLATELET # BLD AUTO: 405 THOUSANDS/UL (ref 149–390)
PMV BLD AUTO: 7.5 FL (ref 8.6–11.7)
POTASSIUM SERPL-SCNC: 3.2 MMOL/L (ref 3.5–5.5)
PROT SERPL-MCNC: 7.3 G/DL (ref 6.4–8.9)
PROT UR STRIP-MCNC: NEGATIVE MG/DL
PROT UR STRIP-MCNC: NEGATIVE MG/DL
RBC # BLD AUTO: 4.97 MILLION/UL (ref 3.9–5.2)
RBC #/AREA URNS AUTO: ABNORMAL /HPF
SODIUM SERPL-SCNC: 138 MMOL/L (ref 134–143)
SP GR UR STRIP.AUTO: 1.01 (ref 1–1.03)
SP GR UR STRIP.AUTO: 1.01 (ref 1–1.03)
UROBILINOGEN UR QL STRIP.AUTO: 0.2 E.U./DL
UROBILINOGEN UR QL STRIP.AUTO: 0.2 E.U./DL
WBC # BLD AUTO: 10.1 THOUSAND/UL (ref 4.8–10.8)
WBC #/AREA URNS AUTO: ABNORMAL /HPF

## 2021-06-18 PROCEDURE — 83605 ASSAY OF LACTIC ACID: CPT | Performed by: EMERGENCY MEDICINE

## 2021-06-18 PROCEDURE — 74018 RADEX ABDOMEN 1 VIEW: CPT

## 2021-06-18 PROCEDURE — 81003 URINALYSIS AUTO W/O SCOPE: CPT | Performed by: EMERGENCY MEDICINE

## 2021-06-18 PROCEDURE — G1004 CDSM NDSC: HCPCS

## 2021-06-18 PROCEDURE — C2617 STENT, NON-COR, TEM W/O DEL: HCPCS | Performed by: UROLOGY

## 2021-06-18 PROCEDURE — 81003 URINALYSIS AUTO W/O SCOPE: CPT | Performed by: UROLOGY

## 2021-06-18 PROCEDURE — C1758 CATHETER, URETERAL: HCPCS | Performed by: UROLOGY

## 2021-06-18 PROCEDURE — 96365 THER/PROPH/DIAG IV INF INIT: CPT

## 2021-06-18 PROCEDURE — 96375 TX/PRO/DX INJ NEW DRUG ADDON: CPT

## 2021-06-18 PROCEDURE — 99284 EMERGENCY DEPT VISIT MOD MDM: CPT | Performed by: EMERGENCY MEDICINE

## 2021-06-18 PROCEDURE — 81001 URINALYSIS AUTO W/SCOPE: CPT | Performed by: EMERGENCY MEDICINE

## 2021-06-18 PROCEDURE — 96376 TX/PRO/DX INJ SAME DRUG ADON: CPT

## 2021-06-18 PROCEDURE — 74420 UROGRAPHY RTRGR +-KUB: CPT

## 2021-06-18 PROCEDURE — 85025 COMPLETE CBC W/AUTO DIFF WBC: CPT | Performed by: EMERGENCY MEDICINE

## 2021-06-18 PROCEDURE — C1769 GUIDE WIRE: HCPCS | Performed by: UROLOGY

## 2021-06-18 PROCEDURE — 80053 COMPREHEN METABOLIC PANEL: CPT | Performed by: EMERGENCY MEDICINE

## 2021-06-18 PROCEDURE — 36415 COLL VENOUS BLD VENIPUNCTURE: CPT | Performed by: EMERGENCY MEDICINE

## 2021-06-18 PROCEDURE — 83690 ASSAY OF LIPASE: CPT | Performed by: EMERGENCY MEDICINE

## 2021-06-18 PROCEDURE — 83735 ASSAY OF MAGNESIUM: CPT | Performed by: EMERGENCY MEDICINE

## 2021-06-18 PROCEDURE — 99285 EMERGENCY DEPT VISIT HI MDM: CPT

## 2021-06-18 PROCEDURE — 74177 CT ABD & PELVIS W/CONTRAST: CPT

## 2021-06-18 PROCEDURE — 87086 URINE CULTURE/COLONY COUNT: CPT | Performed by: UROLOGY

## 2021-06-18 PROCEDURE — 84703 CHORIONIC GONADOTROPIN ASSAY: CPT | Performed by: EMERGENCY MEDICINE

## 2021-06-18 PROCEDURE — 81025 URINE PREGNANCY TEST: CPT | Performed by: EMERGENCY MEDICINE

## 2021-06-18 PROCEDURE — 96361 HYDRATE IV INFUSION ADD-ON: CPT

## 2021-06-18 PROCEDURE — 87040 BLOOD CULTURE FOR BACTERIA: CPT | Performed by: EMERGENCY MEDICINE

## 2021-06-18 DEVICE — INLAY OPTIMA URETERAL STENT W/O GUIDEWIRE
Type: IMPLANTABLE DEVICE | Site: URETER | Status: FUNCTIONAL
Brand: BARD® INLAY OPTIMA® URETERAL STENT

## 2021-06-18 RX ORDER — ONDANSETRON 2 MG/ML
4 INJECTION INTRAMUSCULAR; INTRAVENOUS ONCE
Status: COMPLETED | OUTPATIENT
Start: 2021-06-18 | End: 2021-06-18

## 2021-06-18 RX ORDER — FENTANYL CITRATE/PF 50 MCG/ML
50 SYRINGE (ML) INJECTION
Status: DISCONTINUED | OUTPATIENT
Start: 2021-06-18 | End: 2021-06-18 | Stop reason: HOSPADM

## 2021-06-18 RX ORDER — PROMETHAZINE HYDROCHLORIDE 25 MG/ML
12.5 INJECTION, SOLUTION INTRAMUSCULAR; INTRAVENOUS ONCE
Status: COMPLETED | OUTPATIENT
Start: 2021-06-18 | End: 2021-06-18

## 2021-06-18 RX ORDER — LEVOFLOXACIN 5 MG/ML
500 INJECTION, SOLUTION INTRAVENOUS ONCE
Status: COMPLETED | OUTPATIENT
Start: 2021-06-18 | End: 2021-06-18

## 2021-06-18 RX ORDER — DEXAMETHASONE SODIUM PHOSPHATE 4 MG/ML
INJECTION, SOLUTION INTRA-ARTICULAR; INTRALESIONAL; INTRAMUSCULAR; INTRAVENOUS; SOFT TISSUE AS NEEDED
Status: DISCONTINUED | OUTPATIENT
Start: 2021-06-18 | End: 2021-06-18

## 2021-06-18 RX ORDER — MESALAMINE 1.2 G/1
1200 TABLET, DELAYED RELEASE ORAL 4 TIMES DAILY
COMMUNITY

## 2021-06-18 RX ORDER — LIDOCAINE HYDROCHLORIDE 10 MG/ML
INJECTION, SOLUTION EPIDURAL; INFILTRATION; INTRACAUDAL; PERINEURAL AS NEEDED
Status: DISCONTINUED | OUTPATIENT
Start: 2021-06-18 | End: 2021-06-18

## 2021-06-18 RX ORDER — FENTANYL CITRATE 50 UG/ML
INJECTION, SOLUTION INTRAMUSCULAR; INTRAVENOUS AS NEEDED
Status: DISCONTINUED | OUTPATIENT
Start: 2021-06-18 | End: 2021-06-18

## 2021-06-18 RX ORDER — MIDAZOLAM HYDROCHLORIDE 2 MG/2ML
INJECTION, SOLUTION INTRAMUSCULAR; INTRAVENOUS AS NEEDED
Status: DISCONTINUED | OUTPATIENT
Start: 2021-06-18 | End: 2021-06-18

## 2021-06-18 RX ORDER — HYDROMORPHONE HCL/PF 1 MG/ML
0.5 SYRINGE (ML) INJECTION ONCE
Status: COMPLETED | OUTPATIENT
Start: 2021-06-18 | End: 2021-06-18

## 2021-06-18 RX ORDER — SODIUM CHLORIDE, SODIUM LACTATE, POTASSIUM CHLORIDE, CALCIUM CHLORIDE 600; 310; 30; 20 MG/100ML; MG/100ML; MG/100ML; MG/100ML
INJECTION, SOLUTION INTRAVENOUS CONTINUOUS PRN
Status: DISCONTINUED | OUTPATIENT
Start: 2021-06-18 | End: 2021-06-18

## 2021-06-18 RX ORDER — PROPOFOL 10 MG/ML
INJECTION, EMULSION INTRAVENOUS AS NEEDED
Status: DISCONTINUED | OUTPATIENT
Start: 2021-06-18 | End: 2021-06-18

## 2021-06-18 RX ORDER — BUDESONIDE 3 MG/1
3 CAPSULE, COATED PELLETS ORAL DAILY
COMMUNITY
End: 2022-01-31 | Stop reason: ALTCHOICE

## 2021-06-18 RX ORDER — SUCCINYLCHOLINE/SOD CL,ISO/PF 100 MG/5ML
SYRINGE (ML) INTRAVENOUS AS NEEDED
Status: DISCONTINUED | OUTPATIENT
Start: 2021-06-18 | End: 2021-06-18

## 2021-06-18 RX ORDER — MULTIVITAMIN
1 TABLET ORAL DAILY
COMMUNITY

## 2021-06-18 RX ADMIN — PROMETHAZINE HYDROCHLORIDE 12.5 MG: 25 INJECTION INTRAMUSCULAR; INTRAVENOUS at 09:11

## 2021-06-18 RX ADMIN — MIDAZOLAM HYDROCHLORIDE 2 MG: 1 INJECTION, SOLUTION INTRAMUSCULAR; INTRAVENOUS at 11:59

## 2021-06-18 RX ADMIN — HYDROMORPHONE HYDROCHLORIDE 0.5 MG: 1 INJECTION, SOLUTION INTRAMUSCULAR; INTRAVENOUS; SUBCUTANEOUS at 07:46

## 2021-06-18 RX ADMIN — SODIUM CHLORIDE 1000 ML: 0.9 INJECTION, SOLUTION INTRAVENOUS at 07:43

## 2021-06-18 RX ADMIN — IOHEXOL 100 ML: 350 INJECTION, SOLUTION INTRAVENOUS at 08:51

## 2021-06-18 RX ADMIN — LIDOCAINE HYDROCHLORIDE 50 MG: 10 INJECTION, SOLUTION EPIDURAL; INFILTRATION; INTRACAUDAL; PERINEURAL at 12:04

## 2021-06-18 RX ADMIN — DEXAMETHASONE SODIUM PHOSPHATE 4 MG: 4 INJECTION, SOLUTION INTRA-ARTICULAR; INTRALESIONAL; INTRAMUSCULAR; INTRAVENOUS; SOFT TISSUE at 12:10

## 2021-06-18 RX ADMIN — PROPOFOL 150 MG: 10 INJECTION, EMULSION INTRAVENOUS at 12:04

## 2021-06-18 RX ADMIN — HYDROMORPHONE HYDROCHLORIDE 0.5 MG: 1 INJECTION, SOLUTION INTRAMUSCULAR; INTRAVENOUS; SUBCUTANEOUS at 09:18

## 2021-06-18 RX ADMIN — FENTANYL CITRATE 100 MCG: 50 INJECTION INTRAMUSCULAR; INTRAVENOUS at 11:59

## 2021-06-18 RX ADMIN — SODIUM CHLORIDE, SODIUM LACTATE, POTASSIUM CHLORIDE, AND CALCIUM CHLORIDE: .6; .31; .03; .02 INJECTION, SOLUTION INTRAVENOUS at 11:48

## 2021-06-18 RX ADMIN — LEVOFLOXACIN 500 MG: 5 INJECTION, SOLUTION INTRAVENOUS at 10:25

## 2021-06-18 RX ADMIN — ONDANSETRON 4 MG: 2 INJECTION INTRAMUSCULAR; INTRAVENOUS at 07:45

## 2021-06-18 RX ADMIN — Medication 100 MG: at 12:04

## 2021-06-18 NOTE — ED PROVIDER NOTES
History  Chief Complaint   Patient presents with    Abdominal Pain     According to the patient she hash ad abdominal pain since 0530 this morning with multiple episodes of emesis     Patient reports right lower quadrant pain starting this morning when she woke up  Also with nausea nonbloody nonbilious vomiting  No fevers or chills or urinary symptoms, no discharge bleeding  Abdominal Pain  Pain location:  RLQ  Pain quality: sharp    Pain radiates to:  Does not radiate  Pain severity:  Moderate  Onset quality:  Sudden  Timing:  Constant  Chronicity:  New  Relieved by:  Nothing  Worsened by:  Nothing  Associated symptoms: nausea and vomiting    Associated symptoms: no chest pain, no chills, no cough, no diarrhea, no dysuria, no fever and no sore throat        Prior to Admission Medications   Prescriptions Last Dose Informant Patient Reported? Taking?    BIOTIN PO   Yes Yes   Sig: Take 15 mg by mouth   Cholecalciferol (VITAMIN D3 PO)   Yes Yes   Sig: Take 4,000 Units by mouth   Multiple Vitamin (multivitamin) tablet   Yes Yes   Sig: Take 1 tablet by mouth daily   VITAMIN A PO   Yes Yes   Sig: Take 334 mcg by mouth   amLODIPine (NORVASC) 5 mg tablet   Yes Yes   Sig: Take 5 mg by mouth daily   budesonide (ENTOCORT EC) 3 MG capsule   Yes Yes   Sig: Take 3 mg by mouth daily   cyclobenzaprine (FLEXERIL) 10 mg tablet   No No   Sig: Take 1 tablet (10 mg total) by mouth 3 (three) times a day as needed for muscle spasms   Patient not taking: Reported on 10/12/2020   ibuprofen (MOTRIN) 800 mg tablet   No No   Sig: Take 1 tablet (800 mg total) by mouth 3 (three) times a day   lisinopril (ZESTRIL) 10 mg tablet   Yes Yes   Sig: Take 20 mg by mouth daily    lisinopril-hydrochlorothiazide (PRINZIDE,ZESTORETIC) 10-12 5 MG per tablet Not Taking at Unknown time  Yes No   Sig: Take 1 tablet by mouth daily   Patient not taking: Reported on 6/18/2021   mesalamine (LIALDA) 1 2 g EC tablet   Yes Yes   Sig: Take 1,200 mg by mouth 4 (four) times a day   norgestrel-ethinyl estradiol (LO/OVRAL) 0 3 mg-30 mcg per tablet   Yes Yes   Sig: Take 1 tablet by mouth daily      Facility-Administered Medications: None       Past Medical History:   Diagnosis Date    Crohn disease ([facility] [address] )     Hypertension     IBS (irritable bowel syndrome)        Past Surgical History:   Procedure Laterality Date    CHOLECYSTECTOMY      TONSILLECTOMY         Family History   Problem Relation Age of Onset    Diabetes Mother     Hypertension Father     Heart attack Father     No Known Problems Maternal Grandmother     No Known Problems Maternal Grandfather     No Known Problems Paternal Grandmother     No Known Problems Paternal Grandfather     No Known Problems Maternal Aunt     No Known Problems Maternal Aunt     Breast cancer Paternal Aunt 39     I have reviewed and agree with the history as documented  E-Cigarette/Vaping    E-Cigarette Use Never User      E-Cigarette/Vaping Substances     Social History     Tobacco Use    Smoking status: Never Smoker    Smokeless tobacco: Never Used   Vaping Use    Vaping Use: Never used   Substance Use Topics    Alcohol use: Not Currently    Drug use: Not Currently       Review of Systems   Constitutional: Negative for chills and fever  HENT: Negative for congestion, nosebleeds, rhinorrhea and sore throat  Eyes: Negative for pain and visual disturbance  Respiratory: Negative for cough and wheezing  Cardiovascular: Negative for chest pain and leg swelling  Gastrointestinal: Positive for abdominal pain, nausea and vomiting  Negative for abdominal distention and diarrhea  Genitourinary: Negative for dysuria and frequency  Musculoskeletal: Negative for back pain and joint swelling  Skin: Negative for rash and wound  Neurological: Negative for weakness and numbness  Psychiatric/Behavioral: Negative for decreased concentration and suicidal ideas         Physical Exam  Physical Exam  Vitals and nursing note reviewed  Constitutional:       Appearance: She is well-developed  HENT:      Head: Normocephalic and atraumatic  Eyes:      Conjunctiva/sclera: Conjunctivae normal       Pupils: Pupils are equal, round, and reactive to light  Neck:      Trachea: No tracheal deviation  Cardiovascular:      Rate and Rhythm: Normal rate and regular rhythm  Heart sounds: Normal heart sounds  No murmur heard  Pulmonary:      Effort: Pulmonary effort is normal  No respiratory distress  Breath sounds: Normal breath sounds  No wheezing or rales  Abdominal:      General: Bowel sounds are normal  There is no distension  Palpations: Abdomen is soft  Tenderness: There is abdominal tenderness in the right lower quadrant and suprapubic area  Musculoskeletal:         General: No deformity  Cervical back: Normal range of motion and neck supple  Skin:     General: Skin is warm and dry  Capillary Refill: Capillary refill takes less than 2 seconds  Neurological:      Mental Status: She is alert and oriented to person, place, and time  Sensory: No sensory deficit     Psychiatric:         Judgment: Judgment normal          Vital Signs  ED Triage Vitals [06/18/21 0734]   Temperature Pulse Respirations Blood Pressure SpO2   99 1 °F (37 3 °C) 93 18 (!) 177/83 98 %      Temp Source Heart Rate Source Patient Position - Orthostatic VS BP Location FiO2 (%)   Tympanic Monitor Lying Right arm --      Pain Score       Worst Possible Pain           Vitals:    06/18/21 1130 06/18/21 1238 06/18/21 1250 06/18/21 1257   BP: 124/69 133/74 129/70 122/72   Pulse: 86 (!) 127 (!) 111    Patient Position - Orthostatic VS: Lying            Visual Acuity      ED Medications  Medications   HYDROmorphone (DILAUDID) injection 0 5 mg (0 5 mg Intravenous Given 6/18/21 0746)   ondansetron (ZOFRAN) injection 4 mg (4 mg Intravenous Given 6/18/21 0745)   sodium chloride 0 9 % bolus 1,000 mL (0 mL Intravenous Stopped 6/18/21 1812)   iohexol (OMNIPAQUE) 350 MG/ML injection (SINGLE-DOSE) 100 mL (100 mL Intravenous Given 6/18/21 0851)   promethazine (PHENERGAN) injection 12 5 mg (12 5 mg Intravenous Given 6/18/21 0911)   HYDROmorphone (DILAUDID) injection 0 5 mg (0 5 mg Intravenous Given 6/18/21 0918)   levofloxacin (LEVAQUIN) IVPB (premix in dextrose) 500 mg 100 mL (0 mg Intravenous Stopped 6/18/21 1115)       Diagnostic Studies  Results Reviewed     Procedure Component Value Units Date/Time    Blood culture #1 [[id number]] Collected: 06/18/21 0750    Lab Status: Preliminary result Specimen: Blood from Arm, Right Updated: 06/18/21 1301     Blood Culture Received in Microbiology Lab  Culture in Progress  Blood culture #2 [[id number]] Collected: 06/18/21 0750    Lab Status: Preliminary result Specimen: Blood from Arm, Left Updated: 06/18/21 1301     Blood Culture Received in Microbiology Lab  Culture in Progress      Urine Microscopic [[id number]]  (Abnormal) Collected: 06/18/21 0908    Lab Status: Final result Specimen: Urine, Clean Catch Updated: 06/18/21 0937     RBC, UA 4-10 /hpf      WBC, UA 0-1 /hpf      Epithelial Cells Moderate /hpf      Bacteria, UA None Seen /hpf     POCT pregnancy, urine [[id number]]  (Normal) Resulted: 06/18/21 0922    Lab Status: Final result Updated: 06/18/21 0923     EXT PREG TEST UR (Ref: Negative) Negtive RN and MD made aware     Control [id number] EXP 11/30/2022    UA w Reflex to Microscopic w Reflex to Culture [[id number]]  (Abnormal) Collected: 06/18/21 0908    Lab Status: Final result Specimen: Urine, Clean Catch Updated: 06/18/21 0922     Color, UA Yellow     Clarity, UA Clear     Specific Gravity, UA 1 010     pH, UA 6 5     Leukocytes, UA Negative     Nitrite, UA Negative     Protein, UA Negative mg/dl      Glucose, UA Negative mg/dl      Ketones, UA 15 (1+) mg/dl      Urobilinogen, UA 0 2 E U /dl      Bilirubin, UA Negative     Blood, UA 2+    hCG, qualitative pregnancy [[id number]]  (Normal) Collected: 06/18/21 0750    Lab Status: Final result Specimen: Blood from Arm, Left Updated: 06/18/21 0825     Preg, Serum Negative    Comprehensive metabolic panel [[id number]]  (Abnormal) Collected: 06/18/21 0747    Lab Status: Final result Specimen: Blood from Arm, Right Updated: 06/18/21 0818     Sodium 138 mmol/L      Potassium 3 2 mmol/L      Chloride 104 mmol/L      CO2 24 mmol/L      ANION GAP 10 mmol/L      BUN 16 mg/dL      Creatinine 0 88 mg/dL      Glucose 129 mg/dL      Calcium 9 3 mg/dL      AST 14 U/L      ALT 16 U/L      Alkaline Phosphatase 48 U/L      Total Protein 7 3 g/dL      Albumin 4 3 g/dL      Total Bilirubin 0 50 mg/dL      eGFR 81 ml/min/1 73sq m     Narrative:      [place] guidelines for Chronic Kidney Disease (CKD):     Stage 1 with normal or high GFR (GFR > 90 mL/min/1 73 square meters)    Stage 2 Mild CKD (GFR = 60-89 mL/min/1 73 square meters)    Stage 3A Moderate CKD (GFR = 45-59 mL/min/1 73 square meters)    Stage 3B Moderate CKD (GFR = 30-44 mL/min/1 73 square meters)    Stage 4 Severe CKD (GFR = 15-29 mL/min/1 73 square meters)    Stage 5 End Stage CKD (GFR <15 mL/min/1 73 square meters)  Note: GFR calculation is accurate only with a steady state creatinine    Magnesium [[id number]]  (Normal) Collected: 06/18/21 0747    Lab Status: Final result Specimen: Blood from Arm, Right Updated: 06/18/21 0817     Magnesium 1 9 mg/dL     Lipase [[id number]]  (Normal) Collected: 06/18/21 0747    Lab Status: Final result Specimen: Blood from Arm, Right Updated: 06/18/21 0817     Lipase 14 u/L     Lactic acid, plasma [[id number]]  (Normal) Collected: 06/18/21 0750    Lab Status: Final result Specimen: Blood from Arm, Right Updated: 06/18/21 0817     LACTIC ACID 1 3 mmol/L     Narrative:      Result may be elevated if tourniquet was used during collection      CBC and differential [[id number]]  (Abnormal) Collected: 06/18/21 0747    Lab Status: Final result Specimen: Blood from Arm, Right Updated: 06/18/21 0809     WBC 10 10 Thousand/uL      RBC 4 97 Million/uL      Hemoglobin 14 3 g/dL      Hematocrit 42 5 %      MCV 86 fL      MCH 28 7 pg      MCHC 33 6 g/dL      RDW 13 5 %      MPV 7 5 fL      Platelets 924 Thousands/uL      Neutrophils Relative 68 %      Lymphocytes Relative 23 %      Monocytes Relative 8 %      Eosinophils Relative 1 %      Basophils Relative 0 %      Neutrophils Absolute 6 90 Thousands/µL      Lymphocytes Absolute 2 30 Thousands/µL      Monocytes Absolute 0 80 Thousand/µL      Eosinophils Absolute 0 10 Thousand/µL      Basophils Absolute 0 00 Thousands/µL                  XR abdomen 1 view kub   Final Result by Eliane Michaels MD (06/18 1358)   Persistent 6 to 7 mm calculus at the right ureteropelvic junction with mild hydronephrosis            Workstation performed: [id number]         CT abdomen pelvis with contrast   Final Result by Kuldip Cordova MD (06/18 0914)      7 mm right UPJ calculus with mild right hydronephrosis  7            Workstation performed: [id number]         XR retrograde pyelogram    (Results Pending)              Procedures  Procedures         ED Course  ED Course as of Jun 18 1605 Fri Jun 18, 2021   0927 7mm stone, will discuss with urology      [phone] Spoke with Dr Odette Travis, agree that stone is unlikely to pass spontaneously, will take patient to OR for stent                                              MDM  Number of Diagnoses or Management Options  Kidney stone: new and requires workup  Diagnosis management comments: RLQ ttp  Concering for appendicitis vs pyelo, will ct  Torsion much less likely but this diagnosis may have to be revisited       Amount and/or Complexity of Data Reviewed  Review and summarize past medical records: yes  Discuss the patient with other providers: yes  Independent visualization of images, tracings, or specimens: yes    Risk of Complications, Morbidity, and/or Mortality  Presenting problems: high  Diagnostic procedures: minimal  Management options: high        Disposition  Final diagnoses:   Kidney stone     Time reflects when diagnosis was documented in both MDM as applicable and the Disposition within this note     Time User Action Codes Description Comment    6/18/2021 10:10 AM Joseph Castillo Add [N20 0] Kidney stone     6/18/2021 12:32 PM Darrel Yang Modify [N20 0] Kidney stone       ED Disposition     ED Disposition Condition Date/Time Comment    Send to OR  Fri Jun 18, 2021 10:10 AM Case was discussed with Donalda Councilman and the patient's admission status was agreed to be Admission Status: observation status to the service of Dr Donalda Councilman           Follow-up Information    None         Current Discharge Medication List      CONTINUE these medications which have NOT CHANGED    Details   amLODIPine (NORVASC) 5 mg tablet Take 5 mg by mouth daily      BIOTIN PO Take 15 mg by mouth      budesonide (ENTOCORT EC) 3 MG capsule Take 3 mg by mouth daily      Cholecalciferol (VITAMIN D3 PO) Take 4,000 Units by mouth      lisinopril (ZESTRIL) 10 mg tablet Take 20 mg by mouth daily       mesalamine (LIALDA) 1 2 g EC tablet Take 1,200 mg by mouth 4 (four) times a day      Multiple Vitamin (multivitamin) tablet Take 1 tablet by mouth daily      norgestrel-ethinyl estradiol (LO/OVRAL) 0 3 mg-30 mcg per tablet Take 1 tablet by mouth daily      VITAMIN A PO Take 334 mcg by mouth      cyclobenzaprine (FLEXERIL) 10 mg tablet Take 1 tablet (10 mg total) by mouth 3 (three) times a day as needed for muscle spasms  Qty: 20 tablet, Refills: 0    Associated Diagnoses: Muscle spasm      ibuprofen (MOTRIN) 800 mg tablet Take 1 tablet (800 mg total) by mouth 3 (three) times a day  Qty: 21 tablet, Refills: 0    Associated Diagnoses: Appendicitis epiploica      lisinopril-hydrochlorothiazide (PRINZIDE,ZESTORETIC) 10-12 5 MG per tablet Take 1 tablet by mouth daily           Outpatient Discharge Orders   No strenuous exercise     Call provider for: persistent nausea or vomiting     Call provider for:  severe uncontrolled pain     Call provider for: active or persistent bleeding       PDMP Review     None          ED Provider  Electronically Signed by           Mookie Man DO  06/18/21 6007

## 2021-06-18 NOTE — ANESTHESIA PREPROCEDURE EVALUATION
Medical History  History Comments   Hypertension    IBS (irritable bowel syndrome)    Crohn disease ([facility] [address] )      Procedure:  CYSTOSCOPY RETROGRADE PYELOGRAM WITH INSERTION STENT URETERAL (Right Bladder)    Relevant Problems   ANESTHESIA (within normal limits)        Physical Exam    Airway    Mallampati score: II  TM Distance: >3 FB  Neck ROM: full     Dental   No notable dental hx     Cardiovascular  Rate: normal,     Pulmonary  Pulmonary exam normal     Other Findings        Anesthesia Plan  ASA Score- 2     Anesthesia Type- general with ASA Monitors  Additional Monitors:   Airway Plan: ETT  Plan Factors-Exercise tolerance (METS): >4 METS  Chart reviewed  Patient summary reviewed  Patient is not a current smoker  Induction- intravenous and rapid sequence induction  Postoperative Plan- Plan for postoperative opioid use  Informed Consent- Anesthetic plan and risks discussed with patient  I personally reviewed this patient with the CRNA  Discussed and agreed on the Anesthesia Plan with the CRNA  Barney Dinero

## 2021-06-18 NOTE — DISCHARGE INSTRUCTIONS
Cystoscopy   WHAT YOU NEED TO KNOW:   A cystoscopy is a procedure to look inside your urethra and bladder using a cystoscope  The procedure is used to diagnose and treat conditions of the bladder, urethra, or prostate  DISCHARGE INSTRUCTIONS:   Seek care immediately if:   · Your urine turns from pink to red, or you have clots in your urine  · You cannot urinate and your bladder feels full  · You have severe pain  Contact your healthcare provider or urologist if:   · Your pain or burning during urination becomes worse or lasts longer than 1 day  · Your urine stays pink for longer than 1 day  · You have a fever and chills  · You urinate less than usual, or still feel like you have to urinate after you use the bathroom  · You have questions or concerns about your condition or care  Medicines: You may  be given any of the following:  · Antibiotics  help treat or prevent a bacterial infection  · Acetaminophen  decreases pain and fever  It is available without a doctor's order  Ask how much to take and how often to take it  Follow directions  Read the labels of all other medicines you are using to see if they also contain acetaminophen, or ask your doctor or pharmacist  Acetaminophen can cause liver damage if not taken correctly  Do not use more than 4 grams (4,000 milligrams) total of acetaminophen in one day  · Take your medicine as directed  Contact your healthcare provider if you think your medicine is not helping or if you have side effects  Tell him or her if you are allergic to any medicine  Keep a list of the medicines, vitamins, and herbs you take  Include the amounts, and when and why you take them  Bring the list or the pill bottles to follow-up visits  Carry your medicine list with you in case of an emergency  Follow up with your healthcare provider as directed:  Write down your questions so you remember to ask them during your visits    Self-care:   · Drink liquids as directed  Your healthcare provider may recommend that you drink 6 to 8 eight-ounce cups of water every day for 2 days after your procedure  · Apply a warm, damp washcloth over your urethral opening  This may help to relieve discomfort  · Ask when you can return to regular daily activities  Your healthcare provider may recommend that you rest after your procedure  · Do not have sex  until your healthcare provider tells you it is okay  Sex may increase your risk for a urinary tract infection  © Copyright [address] Information is for End User's use only and may not be sold, redistributed or otherwise used for commercial purposes  All illustrations and images included in CareNotes® are the copyrighted property of [employer]  or [employer]  The above information is an  only  It is not intended as medical advice for individual conditions or treatments  Talk to your doctor, nurse or pharmacist before following any medical regimen to see if it is safe and effective for you  Ureteral Stent Placement   WHAT YOU NEED TO KNOW:   Ureteral stent placement is a procedure to open a blocked or narrow ureter  The ureter is the tube that carries urine from your kidney into your bladder  A stent is a thin hollow plastic tube used to hold your ureter open and allow urine to flow  The stent may stay in for several weeks  Long-term stents will stay in longer and need to be replaced within a certain period of time  DISCHARGE INSTRUCTIONS:   Seek care immediately if:   · You urinate very little or not at all  · You have severe pain in your abdomen, even after you take medicine  · You have heavy bleeding from your urethra  · You see large blood clots in your urine, or your urine is bright red  Contact your healthcare provider or urologist if:   · You have a fever or chills  · You feel like you need to urinate very often      · Your symptoms get worse, or you develop new symptoms  · You have questions or concerns about your condition or care  Medicines: You may  need any of the following:  · Acetaminophen  decreases pain and fever  It is available without a doctor's order  Ask how much to take and how often to take it  Follow directions  Read the labels of all other medicines you are using to see if they also contain acetaminophen, or ask your doctor or pharmacist  Acetaminophen can cause liver damage if not taken correctly  Do not use more than 4 grams (4,000 milligrams) total of acetaminophen in one day  · Prescription pain medicine  may be given  Ask your healthcare provider how to take this medicine safely  Some prescription pain medicines contain acetaminophen  Do not take other medicines that contain acetaminophen without talking to your healthcare provider  Too much acetaminophen may cause liver damage  Prescription pain medicine may cause constipation  Ask your healthcare provider how to prevent or treat constipation  · Antibiotics  help prevent or treat bacterial infections  Your healthcare provider may prescribe these for you while you have a ureteral stent  · Take your medicine as directed  Contact your healthcare provider if you think your medicine is not helping or if you have side effects  Tell him or her if you are allergic to any medicine  Keep a list of the medicines, vitamins, and herbs you take  Include the amounts, and when and why you take them  Bring the list or the pill bottles to follow-up visits  Carry your medicine list with you in case of an emergency  Self-care:   · Drink liquids as directed  Liquids will help flush your urinary tract and prevent infection  Ask your healthcare provider how much liquid to drink each day and which liquids are best for you  · Ask when you can return to daily activities  You may be able to return to normal activities the day after your stent placement      Follow up with your urologist as directed: You will need regular follow-up visits with your urologist as long as you have a stent  He or she will check to make sure the stent is working properly  He or she will remove your temporary stent in several weeks  Your provider may do urine cultures to check for infection  Write down your questions so you remember to ask them during your visits  © Copyright [address] Information is for End User's use only and may not be sold, redistributed or otherwise used for commercial purposes  All illustrations and images included in CareNotes® are the copyrighted property of [employer]  or [facility] Jazmyne Gonzalez   The above information is an  only  It is not intended as medical advice for individual conditions or treatments  Talk to your doctor, nurse or pharmacist before following any medical regimen to see if it is safe and effective for you

## 2021-06-18 NOTE — OP NOTE
OPERATIVE REPORT  PATIENT NAME: Alem Snow    :  1980  MRN: [id number]  Pt Location: [facility] OR ROOM 02    SURGERY DATE: 2021    Surgeon(s) and Role:     * Missy Holt MD - Primary    Preop Diagnosis:  Kidney stone [N20 0]    Post-Op Diagnosis Codes:     * Kidney stone [N20 0]    Procedure(s) (LRB):  CYSTOSCOPY RETROGRADE PYELOGRAM WITH INSERTION STENT URETERAL (Right)    Specimen(s):  ID Type Source Tests Collected by Time Destination   A :  Urine Urinary Bladder URINE CULTURE, URINALYSIS WITH REFLEX TO SCOPE Missy Holt MD 2021 1225        Estimated Blood Loss:   Minimal    Drains:  * No LDAs found *    Anesthesia Type:   Choice    Operative Indications:  Kidney stone [N20 0]  This is a 39-year-old female who presented with right renal colic secondary to a large right UPJ stone with moderate hydronephrosis on CT scan and subsequent KUB  Options, procedures, benefits and risks were discussed and she agreed to the planned procedure  Operative Findings:  Residual contrast in the right kidney with moderate hydronephrosis down to the UPJ  The stone migrated into the renal pelvis during the procedure  Complications:   None    Procedure and Technique:  The patient was properly identified in the operating room and after adequate general anesthesia was placed in the lithotomy position  The lower abdomen and genitalia were prepped and draped in the usual fashion  Twenty-one Kenyan cystoscope was inserted into the bladder urine was drained and sent for urinalysis and culture  Cystoscopy was performed with 70 degree and 30 degree lenses  The bladder was smooth with no masses or calcifications  The ureteral orifices were normal position and of normal configuration bilaterally  A 5 Kenyan open-ended ureteral catheter was passed into the right ureteral orifice and contrast material was instilled under fluoroscopic guidance    The course and caliber of the ureter was normal with no dilation or filling defects until the level of the UPJ  At this point the stone was seen with moderate hydronephrosis above  The catheter was passed below the stone and contrast was instilled gently  However, the stone did not move  A solo guidewire was passed through the catheter into the collecting system and the catheter followed into the collecting system  The stone then appeared to migrate into the renal pelvis and appeared as a filling defect in the renal pelvis  The ureteral length was measured and the catheter was removed leaving the wire in place  A 6 Mongolian 24 cm Bard inlay optima stent was passed over the wire and the wire was removed leaving a good curl of stent within the upper pole and within the bladder  The bladder was emptied and the cystoscope was removed  The patient tolerated the procedure well and left the operating room in good condition  Plan will be to check postoperative KUB for stone position and size  Options and risks will then be discussed with the patient as an outpatient to determine the next procedure to remove the stone     I was present for the entire procedure    Patient Disposition:  PACU     SIGNATURE: Missy Holt MD  DATE: June 18, 2021  TIME: 12:32 PM

## 2021-06-18 NOTE — H&P
H&P Exam - Urology   Rosalva Cerda 41 y o  female MRN: [id number]  Unit/Bed#: OR POOL Encounter: [id number]    Assessment/Plan     Assessment:  Right renal colic secondary to obstructing right UPJ stone  Plan:  Options, procedures, benefits and risks were discussed and she agrees to the planned procedure which will include cystoscopy with right retrograde urogram and insertion of right ureteral stent  She understands that the stone will not be removed at this time and will require additional procedures in the future  She received Levaquin 500 mg IV in the emergency room  History of Present Illness   HPI:  Rosalva Cerda is a 41 y o  female who presents with right upper quadrant pain for about 1 week which radiated to the right flank today which was quite severe  She went to the emergency department where a CT scan showed an obstructing right UPJ stone  The stone was difficult to measure since it was present on multiple levels, but is at least 8-9 mm and probably larger  KUB x-ray showed residual contrast in the right kidney with moderate hydronephrosis  The contrast obscured the stone  She did have nausea and vomiting but denies fever or chills  She denies dysuria or hematuria  She has no previous history of kidney stones  There is a family history of kidney stones in her father and brother       Review of Systems   Gastrointestinal: Positive for abdominal pain  Negative for abdominal distention  Genitourinary: Positive for flank pain  Negative for difficulty urinating, dysuria and hematuria         Historical Information   Past Medical History:   Diagnosis Date    Crohn disease ([facility] [address] )     Hypertension     IBS (irritable bowel syndrome)      Past Surgical History:   Procedure Laterality Date    CHOLECYSTECTOMY      TONSILLECTOMY       Social History   Social History     Substance and Sexual Activity   Alcohol Use Not Currently     Social History     Substance and Sexual Activity   Drug Use Not Currently     Social History     Tobacco Use   Smoking Status Never Smoker   Smokeless Tobacco Never Used     E-Cigarette/Vaping    E-Cigarette Use Never User      E-Cigarette/Vaping Substances     Family History: non-contributory    Meds/Allergies   PTA meds:   Prior to Admission Medications   Prescriptions Last Dose Informant Patient Reported? Taking? BIOTIN PO   Yes Yes   Sig: Take 15 mg by mouth   Cholecalciferol (VITAMIN D3 PO)   Yes Yes   Sig: Take 4,000 Units by mouth   Multiple Vitamin (multivitamin) tablet   Yes Yes   Sig: Take 1 tablet by mouth daily   VITAMIN A PO   Yes Yes   Sig: Take 334 mcg by mouth   amLODIPine (NORVASC) 5 mg tablet   Yes Yes   Sig: Take 5 mg by mouth daily   budesonide (ENTOCORT EC) 3 MG capsule   Yes Yes   Sig: Take 3 mg by mouth daily   cyclobenzaprine (FLEXERIL) 10 mg tablet   No No   Sig: Take 1 tablet (10 mg total) by mouth 3 (three) times a day as needed for muscle spasms   Patient not taking: Reported on 10/12/2020   ibuprofen (MOTRIN) 800 mg tablet   No No   Sig: Take 1 tablet (800 mg total) by mouth 3 (three) times a day   lisinopril (ZESTRIL) 10 mg tablet   Yes Yes   Sig: Take 20 mg by mouth daily    lisinopril-hydrochlorothiazide (PRINZIDE,ZESTORETIC) 10-12 5 MG per tablet Not Taking at Unknown time  Yes No   Sig: Take 1 tablet by mouth daily   Patient not taking: Reported on 6/18/2021   mesalamine (LIALDA) 1 2 g EC tablet   Yes Yes   Sig: Take 1,200 mg by mouth 4 (four) times a day   norgestrel-ethinyl estradiol (LO/OVRAL) 0 3 mg-30 mcg per tablet   Yes Yes   Sig: Take 1 tablet by mouth daily      Facility-Administered Medications: None     No Known Allergies    Objective   Vitals: Blood pressure 124/69, pulse 86, temperature 99 1 °F (37 3 °C), temperature source Tympanic, resp  rate 18, height 5' (1 524 m), weight 85 3 kg (188 lb), SpO2 95 %, not currently breastfeeding  I/O last 24 hours:   In: 1083 3 [IV Piggyback:1083 3]  Out: -     Invasive Devices Peripheral Intravenous Line            Peripheral IV 06/18/21 Right Antecubital <1 day                Physical Exam  Abdominal:      General: There is no distension  Palpations: Abdomen is soft  Tenderness: There is abdominal tenderness  Mild right upper quadrant tenderness and mild right flank tenderness    Lab Results:   CBC:   Lab Results   Component Value Date    WBC 10 10 06/18/2021    HGB 14 3 06/18/2021    HCT 42 5 06/18/2021    MCV 86 06/18/2021     (H) 06/18/2021    MCH 28 7 06/18/2021    MCHC 33 6 06/18/2021    RDW 13 5 06/18/2021    MPV 7 5 (L) 06/18/2021     CMP:   Lab Results   Component Value Date    SODIUM 138 06/18/2021     06/18/2021    CO2 24 06/18/2021    BUN 16 06/18/2021    CREATININE 0 88 06/18/2021    CALCIUM 9 3 06/18/2021    AST 14 06/18/2021    ALT 16 06/18/2021    ALKPHOS 48 06/18/2021    EGFR 81 06/18/2021     Urinalysis:   Lab Results   Component Value Date    COLORU Yellow 06/18/2021    CLARITYU Clear 06/18/2021    SPECGRAV 1 010 06/18/2021    PHUR 6 5 06/18/2021    LEUKOCYTESUR Negative 06/18/2021    NITRITE Negative 06/18/2021    GLUCOSEU Negative 06/18/2021    KETONESU 15 (1+) (A) 06/18/2021    BILIRUBINUR Negative 06/18/2021    BLOODU 2+ (A) 06/18/2021     Urine Culture: No results found for: URINECX  Imaging: I have personally reviewed pertinent reports  EKG, Pathology, and Other Studies: I have personally reviewed pertinent reports  VTE Prophylaxis: Sequential compression device Merceda Booty)     Code Status: No Order  Advance Directive and Living Will:      Power of :    POLST:      Counseling / Coordination of Care  Total floor / unit time spent today 30 minutes  Greater than 50% of total time was spent with the patient and / or family counseling and / or coordination of care  A description of the counseling / coordination of care:  I have discussed the case with the emergency room physician

## 2021-06-18 NOTE — ANESTHESIA POSTPROCEDURE EVALUATION
Post-Op Assessment Note    CV Status:  Stable  Pain Score: 0    Pain management: adequate     Mental Status:  Alert   Hydration Status:  Stable   PONV Controlled:  Controlled   Airway Patency:  Patent      Post Op Vitals Reviewed: Yes      Staff: CRNA         No complications documented      BP   133/74   Temp      Pulse 122   Resp 20   SpO2 100

## 2021-06-20 ENCOUNTER — HOSPITAL ENCOUNTER (OUTPATIENT)
Dept: RADIOLOGY | Facility: HOSPITAL | Age: 41
Discharge: HOME | End: 2021-06-20
Payer: COMMERCIAL

## 2021-06-20 LAB — BACTERIA UR CULT: NORMAL

## 2021-06-20 PROCEDURE — 74018 RADEX ABDOMEN 1 VIEW: CPT

## 2021-06-23 LAB
BACTERIA BLD CULT: NORMAL
BACTERIA BLD CULT: NORMAL

## 2021-06-24 DIAGNOSIS — N20.0 KIDNEY STONE: Primary | ICD-10-CM

## 2021-06-29 ENCOUNTER — HOSPITAL ENCOUNTER (OUTPATIENT)
Dept: RADIOLOGY | Facility: HOSPITAL | Age: 41
Discharge: HOME | End: 2021-06-29
Attending: UROLOGY
Payer: COMMERCIAL

## 2021-06-29 DIAGNOSIS — N20.0 KIDNEY STONE: ICD-10-CM

## 2021-06-29 PROCEDURE — 74019 RADEX ABDOMEN 2 VIEWS: CPT

## 2021-06-30 NOTE — DISCHARGE SUMMARY
Discharge Summary - Elisha Bermudez 41 y o  female MRN: [id number]    Unit/Bed#: [facility] Encounter: [id number]    Admission Date:     Admitting Diagnosis: Kidney stone [N20 0]  Abdominal pain [R10 9]    HPI: Right renal colic from right UPJ stone    Procedures Performed: No orders of the defined types were placed in this encounter  Summary of Hospital Course: cystoscopy and insertion of right ureteral stent    Significant Findings, Care, Treatment and Services Provided: At least 8-9 mm right UPJ stone treated with stent insertion    Complications: none    Discharge Diagnosis: right UPJ stone    Medical Problems     Resolved Problems  Date Reviewed: 6/18/2021    None                Condition at Discharge: good         Discharge instructions/Information to patient and family:   See after visit summary for information provided to patient and family  Provisions for Follow-Up Care:  See after visit summary for information related to follow-up care and any pertinent home health orders  PCP: Amelia Pugh MD    Disposition: Home    Planned Readmission: No      Discharge Statement   I spent 15 minutes discharging the patient  This time was spent on the day of discharge  I had direct contact with the patient on the day of discharge  Additional documentation is required if more than 30 minutes were spent on discharge  Discharge Medications:  See after visit summary for reconciled discharge medications provided to patient and family

## 2021-07-01 ENCOUNTER — APPOINTMENT (OUTPATIENT)
Dept: LAB | Facility: HOSPITAL | Age: 41
End: 2021-07-01
Attending: PHYSICIAN ASSISTANT
Payer: COMMERCIAL

## 2021-07-01 ENCOUNTER — HOSPITAL ENCOUNTER (OUTPATIENT)
Dept: MAMMOGRAPHY | Facility: HOSPITAL | Age: 41
Discharge: HOME | End: 2021-07-01
Attending: SPECIALIST
Payer: COMMERCIAL

## 2021-07-01 VITALS — WEIGHT: 188 LBS | HEIGHT: 60 IN | BODY MASS INDEX: 36.91 KG/M2

## 2021-07-01 DIAGNOSIS — K63.3 INTESTINAL ULCER: ICD-10-CM

## 2021-07-01 DIAGNOSIS — Z12.31 ENCOUNTER FOR SCREENING MAMMOGRAM FOR MALIGNANT NEOPLASM OF BREAST: ICD-10-CM

## 2021-07-01 DIAGNOSIS — K50.018 CROHN'S DISEASE OF SMALL INTESTINE WITH OTHER COMPLICATION (HCC): ICD-10-CM

## 2021-07-01 LAB
ALBUMIN SERPL BCP-MCNC: 3.9 G/DL (ref 3.5–5.7)
ALP SERPL-CCNC: 38 U/L (ref 40–150)
ALT SERPL W P-5'-P-CCNC: 10 U/L (ref 7–52)
ANION GAP SERPL CALCULATED.3IONS-SCNC: 8 MMOL/L (ref 4–13)
AST SERPL W P-5'-P-CCNC: 9 U/L (ref 13–39)
BASOPHILS # BLD AUTO: 0 THOUSANDS/ΜL (ref 0–0.1)
BASOPHILS NFR BLD AUTO: 1 % (ref 0–2)
BILIRUB SERPL-MCNC: 0.4 MG/DL (ref 0.2–1)
BUN SERPL-MCNC: 10 MG/DL (ref 7–25)
CALCIUM SERPL-MCNC: 8.7 MG/DL (ref 8.6–10.5)
CHLORIDE SERPL-SCNC: 104 MMOL/L (ref 98–107)
CO2 SERPL-SCNC: 27 MMOL/L (ref 21–31)
CREAT SERPL-MCNC: 0.74 MG/DL (ref 0.6–1.2)
CRP SERPL QL: 19.3 MG/L
EOSINOPHIL # BLD AUTO: 0.1 THOUSAND/ΜL (ref 0–0.61)
EOSINOPHIL NFR BLD AUTO: 2 % (ref 0–5)
ERYTHROCYTE [DISTWIDTH] IN BLOOD BY AUTOMATED COUNT: 13.5 % (ref 11.5–14.5)
ERYTHROCYTE [SEDIMENTATION RATE] IN BLOOD: 21 MM/HOUR (ref 0–19)
GFR SERPL CREATININE-BSD FRML MDRD: 100 ML/MIN/1.73SQ M
GLUCOSE P FAST SERPL-MCNC: 97 MG/DL (ref 65–99)
HCT VFR BLD AUTO: 40.2 % (ref 42–47)
HGB BLD-MCNC: 13.3 G/DL (ref 12–16)
LYMPHOCYTES # BLD AUTO: 2 THOUSANDS/ΜL (ref 0.6–4.47)
LYMPHOCYTES NFR BLD AUTO: 24 % (ref 21–51)
MCH RBC QN AUTO: 28.6 PG (ref 26–34)
MCHC RBC AUTO-ENTMCNC: 33 G/DL (ref 31–37)
MCV RBC AUTO: 87 FL (ref 81–99)
MONOCYTES # BLD AUTO: 0.6 THOUSAND/ΜL (ref 0.17–1.22)
MONOCYTES NFR BLD AUTO: 7 % (ref 2–12)
NEUTROPHILS # BLD AUTO: 5.7 THOUSANDS/ΜL (ref 1.4–6.5)
NEUTS SEG NFR BLD AUTO: 67 % (ref 42–75)
PLATELET # BLD AUTO: 432 THOUSANDS/UL (ref 149–390)
PMV BLD AUTO: 7.8 FL (ref 8.6–11.7)
POTASSIUM SERPL-SCNC: 3.6 MMOL/L (ref 3.5–5.5)
PROT SERPL-MCNC: 7 G/DL (ref 6.4–8.9)
RBC # BLD AUTO: 4.63 MILLION/UL (ref 3.9–5.2)
SODIUM SERPL-SCNC: 139 MMOL/L (ref 134–143)
WBC # BLD AUTO: 8.4 THOUSAND/UL (ref 4.8–10.8)

## 2021-07-01 PROCEDURE — 77063 BREAST TOMOSYNTHESIS BI: CPT

## 2021-07-01 PROCEDURE — 86255 FLUORESCENT ANTIBODY SCREEN: CPT

## 2021-07-01 PROCEDURE — 80053 COMPREHEN METABOLIC PANEL: CPT

## 2021-07-01 PROCEDURE — 85025 COMPLETE CBC W/AUTO DIFF WBC: CPT

## 2021-07-01 PROCEDURE — 77067 SCR MAMMO BI INCL CAD: CPT

## 2021-07-01 PROCEDURE — 83516 IMMUNOASSAY NONANTIBODY: CPT

## 2021-07-01 PROCEDURE — 85652 RBC SED RATE AUTOMATED: CPT

## 2021-07-01 PROCEDURE — 86140 C-REACTIVE PROTEIN: CPT

## 2021-07-01 PROCEDURE — 82784 ASSAY IGA/IGD/IGG/IGM EACH: CPT

## 2021-07-01 PROCEDURE — 36415 COLL VENOUS BLD VENIPUNCTURE: CPT

## 2021-07-02 LAB
ENDOMYSIUM IGA SER QL: NEGATIVE
GLIADIN PEPTIDE IGA SER-ACNC: 3 UNITS (ref 0–19)
GLIADIN PEPTIDE IGG SER-ACNC: 1 UNITS (ref 0–19)
IGA SERPL-MCNC: 205 MG/DL (ref 87–352)
TTG IGA SER-ACNC: <2 U/ML (ref 0–3)
TTG IGG SER-ACNC: 4 U/ML (ref 0–5)

## 2021-07-14 RX ORDER — ACETAMINOPHEN 325 MG/1
650 TABLET ORAL EVERY 6 HOURS PRN
COMMUNITY
End: 2022-01-31 | Stop reason: ALTCHOICE

## 2021-07-14 NOTE — PRE-PROCEDURE INSTRUCTIONS
Pre-Surgery Instructions:   Medication Instructions    acetaminophen (TYLENOL) 325 mg tablet Instructed patient per Anesthesia Guidelines   amLODIPine (NORVASC) 5 mg tablet Instructed patient per Anesthesia Guidelines   Cholecalciferol (VITAMIN D3 PO) Instructed patient per Anesthesia Guidelines   lisinopril (ZESTRIL) 10 mg tablet Instructed patient per Anesthesia Guidelines   mesalamine (LIALDA) 1 2 g EC tablet Instructed patient per Anesthesia Guidelines   Multiple Vitamin (multivitamin) tablet Instructed patient per Anesthesia Guidelines   norgestrel-ethinyl estradiol (LO/OVRAL) 0 3 mg-30 mcg per tablet Instructed patient per Anesthesia Guidelines  Pre-op and bathing instructions given  Pt advised to hold mesalamine and vitamins immediately - LD of both was today 7-14-21  Pt will take only amlodipine am DOS

## 2021-07-15 ENCOUNTER — ANESTHESIA EVENT (OUTPATIENT)
Dept: PERIOP | Facility: HOSPITAL | Age: 41
End: 2021-07-15
Payer: COMMERCIAL

## 2021-07-15 NOTE — ANESTHESIA PREPROCEDURE EVALUATION
Procedure:  LITHOTRIPSY EXTRACORPORAL SHOCKWAVE (ESWL) (Right Ureter)    Relevant Problems   ANESTHESIA   (+) PONV (postoperative nausea and vomiting)      CARDIO   (+) Hypertension      ENDO (within normal limits)      GI/HEPATIC (within normal limits)      /RENAL   (+) Obstruction of right ureteropelvic junction (UPJ) due to stone      HEMATOLOGY (within normal limits)      NEURO/PSYCH (within normal limits)      PULMONARY (within normal limits)      Other   (+) Crohn disease (HCC)   (+) IBS (irritable bowel syndrome)      Lab Results   Component Value Date    WBC 8 40 07/01/2021    HGB 13 3 07/01/2021    HCT 40 2 (L) 07/01/2021    MCV 87 07/01/2021     (H) 07/01/2021     Lab Results   Component Value Date    SODIUM 139 07/01/2021    K 3 6 07/01/2021     07/01/2021    CO2 27 07/01/2021    BUN 10 07/01/2021    CREATININE 0 74 07/01/2021    GLUC 129 (H) 06/18/2021    CALCIUM 8 7 07/01/2021     No results found for: INR, PROTIME  No results found for: HGBA1C       Physical Exam    Airway    Mallampati score: I  TM Distance: >3 FB  Neck ROM: full     Dental   No notable dental hx     Cardiovascular  Cardiovascular exam normal    Pulmonary  Pulmonary exam normal     Other Findings        Anesthesia Plan  ASA Score- 2     Anesthesia Type- general with ASA Monitors  Additional Monitors:   Airway Plan: LMA  Plan Factors-Exercise tolerance (METS): >4 METS  Chart reviewed  Existing labs reviewed  Patient summary reviewed  Induction- intravenous  Postoperative Plan-     Informed Consent- Anesthetic plan and risks discussed with patient  I personally reviewed this patient with the CRNA  Discussed and agreed on the Anesthesia Plan with the CRNA  Katie Barakat

## 2021-07-16 ENCOUNTER — APPOINTMENT (OUTPATIENT)
Dept: RADIOLOGY | Facility: HOSPITAL | Age: 41
End: 2021-07-16
Attending: UROLOGY
Payer: COMMERCIAL

## 2021-07-16 ENCOUNTER — HOSPITAL ENCOUNTER (OUTPATIENT)
Facility: HOSPITAL | Age: 41
Setting detail: OUTPATIENT SURGERY
Discharge: HOME | End: 2021-07-16
Attending: UROLOGY | Admitting: UROLOGY
Payer: COMMERCIAL

## 2021-07-16 ENCOUNTER — ANESTHESIA (OUTPATIENT)
Dept: PERIOP | Facility: HOSPITAL | Age: 41
End: 2021-07-16
Payer: COMMERCIAL

## 2021-07-16 VITALS
DIASTOLIC BLOOD PRESSURE: 72 MMHG | BODY MASS INDEX: 36.91 KG/M2 | HEART RATE: 96 BPM | RESPIRATION RATE: 20 BRPM | HEIGHT: 60 IN | TEMPERATURE: 98.2 F | WEIGHT: 188 LBS | OXYGEN SATURATION: 94 % | SYSTOLIC BLOOD PRESSURE: 125 MMHG

## 2021-07-16 PROBLEM — R11.2 PONV (POSTOPERATIVE NAUSEA AND VOMITING): Status: ACTIVE | Noted: 2021-07-16

## 2021-07-16 PROBLEM — Z98.890 PONV (POSTOPERATIVE NAUSEA AND VOMITING): Status: ACTIVE | Noted: 2021-07-16

## 2021-07-16 LAB
EXT PREGNANCY TEST URINE: NEGATIVE
EXT. CONTROL: NORMAL

## 2021-07-16 PROCEDURE — 74018 RADEX ABDOMEN 1 VIEW: CPT

## 2021-07-16 PROCEDURE — 81025 URINE PREGNANCY TEST: CPT | Performed by: UROLOGY

## 2021-07-16 RX ORDER — FENTANYL CITRATE/PF 50 MCG/ML
25 SYRINGE (ML) INJECTION
Status: DISCONTINUED | OUTPATIENT
Start: 2021-07-16 | End: 2021-07-16 | Stop reason: HOSPADM

## 2021-07-16 RX ORDER — LIDOCAINE HYDROCHLORIDE 10 MG/ML
0.5 INJECTION, SOLUTION EPIDURAL; INFILTRATION; INTRACAUDAL; PERINEURAL ONCE AS NEEDED
Status: COMPLETED | OUTPATIENT
Start: 2021-07-16 | End: 2021-07-16

## 2021-07-16 RX ORDER — ONDANSETRON 2 MG/ML
INJECTION INTRAMUSCULAR; INTRAVENOUS AS NEEDED
Status: DISCONTINUED | OUTPATIENT
Start: 2021-07-16 | End: 2021-07-16

## 2021-07-16 RX ORDER — PROPOFOL 10 MG/ML
INJECTION, EMULSION INTRAVENOUS AS NEEDED
Status: DISCONTINUED | OUTPATIENT
Start: 2021-07-16 | End: 2021-07-16

## 2021-07-16 RX ORDER — SODIUM CHLORIDE, SODIUM LACTATE, POTASSIUM CHLORIDE, CALCIUM CHLORIDE 600; 310; 30; 20 MG/100ML; MG/100ML; MG/100ML; MG/100ML
125 INJECTION, SOLUTION INTRAVENOUS CONTINUOUS
Status: DISCONTINUED | OUTPATIENT
Start: 2021-07-16 | End: 2021-07-16 | Stop reason: HOSPADM

## 2021-07-16 RX ORDER — ONDANSETRON 2 MG/ML
4 INJECTION INTRAMUSCULAR; INTRAVENOUS ONCE AS NEEDED
Status: COMPLETED | OUTPATIENT
Start: 2021-07-16 | End: 2021-07-16

## 2021-07-16 RX ORDER — FENTANYL CITRATE 50 UG/ML
INJECTION, SOLUTION INTRAMUSCULAR; INTRAVENOUS AS NEEDED
Status: DISCONTINUED | OUTPATIENT
Start: 2021-07-16 | End: 2021-07-16

## 2021-07-16 RX ORDER — METOCLOPRAMIDE HYDROCHLORIDE 5 MG/ML
INJECTION INTRAMUSCULAR; INTRAVENOUS AS NEEDED
Status: DISCONTINUED | OUTPATIENT
Start: 2021-07-16 | End: 2021-07-16

## 2021-07-16 RX ORDER — PROMETHAZINE HYDROCHLORIDE 25 MG/ML
25 INJECTION, SOLUTION INTRAMUSCULAR; INTRAVENOUS ONCE AS NEEDED
Status: DISCONTINUED | OUTPATIENT
Start: 2021-07-16 | End: 2021-07-16 | Stop reason: HOSPADM

## 2021-07-16 RX ORDER — HYDROMORPHONE HCL IN WATER/PF 6 MG/30 ML
0.2 PATIENT CONTROLLED ANALGESIA SYRINGE INTRAVENOUS
Status: DISCONTINUED | OUTPATIENT
Start: 2021-07-16 | End: 2021-07-16 | Stop reason: HOSPADM

## 2021-07-16 RX ORDER — DEXAMETHASONE SODIUM PHOSPHATE 10 MG/ML
INJECTION, SOLUTION INTRAMUSCULAR; INTRAVENOUS AS NEEDED
Status: DISCONTINUED | OUTPATIENT
Start: 2021-07-16 | End: 2021-07-16

## 2021-07-16 RX ORDER — GLYCOPYRROLATE 0.2 MG/ML
INJECTION INTRAMUSCULAR; INTRAVENOUS AS NEEDED
Status: DISCONTINUED | OUTPATIENT
Start: 2021-07-16 | End: 2021-07-16

## 2021-07-16 RX ORDER — LEVOFLOXACIN 5 MG/ML
500 INJECTION, SOLUTION INTRAVENOUS ONCE
Status: COMPLETED | OUTPATIENT
Start: 2021-07-16 | End: 2021-07-16

## 2021-07-16 RX ORDER — LIDOCAINE HYDROCHLORIDE 10 MG/ML
INJECTION, SOLUTION EPIDURAL; INFILTRATION; INTRACAUDAL; PERINEURAL AS NEEDED
Status: DISCONTINUED | OUTPATIENT
Start: 2021-07-16 | End: 2021-07-16

## 2021-07-16 RX ORDER — MIDAZOLAM HYDROCHLORIDE 2 MG/2ML
INJECTION, SOLUTION INTRAMUSCULAR; INTRAVENOUS AS NEEDED
Status: DISCONTINUED | OUTPATIENT
Start: 2021-07-16 | End: 2021-07-16

## 2021-07-16 RX ORDER — SCOLOPAMINE TRANSDERMAL SYSTEM 1 MG/1
1 PATCH, EXTENDED RELEASE TRANSDERMAL ONCE
Status: COMPLETED | OUTPATIENT
Start: 2021-07-16 | End: 2021-07-16

## 2021-07-16 RX ADMIN — DEXAMETHASONE SODIUM PHOSPHATE 10 MG: 10 INJECTION, SOLUTION INTRAMUSCULAR; INTRAVENOUS at 10:30

## 2021-07-16 RX ADMIN — SCOPALAMINE 1 PATCH: 1 PATCH, EXTENDED RELEASE TRANSDERMAL at 10:12

## 2021-07-16 RX ADMIN — ONDANSETRON 4 MG: 2 INJECTION INTRAMUSCULAR; INTRAVENOUS at 11:46

## 2021-07-16 RX ADMIN — FENTANYL CITRATE 50 MCG: 50 INJECTION INTRAMUSCULAR; INTRAVENOUS at 10:26

## 2021-07-16 RX ADMIN — SODIUM CHLORIDE, SODIUM LACTATE, POTASSIUM CHLORIDE, AND CALCIUM CHLORIDE 125 ML/HR: .6; .31; .03; .02 INJECTION, SOLUTION INTRAVENOUS at 10:03

## 2021-07-16 RX ADMIN — FENTANYL CITRATE 25 MCG: 50 INJECTION INTRAMUSCULAR; INTRAVENOUS at 10:36

## 2021-07-16 RX ADMIN — ONDANSETRON 4 MG: 2 INJECTION INTRAMUSCULAR; INTRAVENOUS at 10:30

## 2021-07-16 RX ADMIN — FENTANYL CITRATE 25 MCG: 50 INJECTION INTRAMUSCULAR; INTRAVENOUS at 10:49

## 2021-07-16 RX ADMIN — PROPOFOL 200 MG: 10 INJECTION, EMULSION INTRAVENOUS at 10:22

## 2021-07-16 RX ADMIN — LIDOCAINE HYDROCHLORIDE 0.5 ML: 10 INJECTION, SOLUTION EPIDURAL; INFILTRATION; INTRACAUDAL; PERINEURAL at 10:02

## 2021-07-16 RX ADMIN — LIDOCAINE HYDROCHLORIDE 50 MG: 10 INJECTION, SOLUTION EPIDURAL; INFILTRATION; INTRACAUDAL; PERINEURAL at 10:22

## 2021-07-16 RX ADMIN — METOCLOPRAMIDE HYDROCHLORIDE 10 MG: 5 INJECTION INTRAMUSCULAR; INTRAVENOUS at 10:32

## 2021-07-16 RX ADMIN — GLYCOPYRROLATE 0.2 MG: 0.2 INJECTION, SOLUTION INTRAMUSCULAR; INTRAVENOUS at 10:22

## 2021-07-16 RX ADMIN — LEVOFLOXACIN 500 MG: 5 INJECTION, SOLUTION INTRAVENOUS at 10:06

## 2021-07-16 RX ADMIN — LEVOFLOXACIN: 5 INJECTION, SOLUTION INTRAVENOUS at 10:29

## 2021-07-16 RX ADMIN — MIDAZOLAM 2 MG: 1 INJECTION INTRAMUSCULAR; INTRAVENOUS at 10:13

## 2021-07-16 NOTE — DISCHARGE INSTRUCTIONS
Obtain KUB x-ray next week and call doctor Paralee Ahumada once completed  Doctor Paralee Ahumada will call to schedule stent removal   Take Keflex 500 mg b i d  Starting the night before  Lithotripsy   WHAT YOU NEED TO KNOW:   Lithotripsy is a procedure that uses sound waves to break up stones in the kidney, ureter, or bladder  The stone pieces then pass out of your body through your urine  You may have blood in your urine for 1 to 2 days after the procedure  You may also have bruising and discomfort in your back or abdomen  You may have pain whenever you pass pieces of your kidney stone  This may happen over a few weeks  DISCHARGE INSTRUCTIONS:   Call your local emergency number (911 in the [address] [facility]) if:   · You have chest pain  Call your doctor if:   · You have trouble thinking clearly  · You have severe lower back pain  · You urinate bright red blood  · You have severe vomiting  · You cannot urinate  · You have a fever and chills  · You feel burning when you urinate  · You feel the urge to urinate often and immediately  · You have questions or concerns about your condition or care  Medicines:   · Medicines  can help decrease pain or prevent an infection  Medicines may also help you pass the stones or prevent more stones from forming  · Take your medicine as directed  Contact your healthcare provider if you think your medicine is not helping or if you have side effects  Tell him or her if you are allergic to any medicine  Keep a list of the medicines, vitamins, and herbs you take  Include the amounts, and when and why you take them  Bring the list or the pill bottles to follow-up visits  Carry your medicine list with you in case of an emergency  Self-care:   · Strain your urine every time you go to the bathroom  Urinate through a strainer or a piece of thin cloth to catch the stones  Take the pieces to your follow-up visits  · If you have a stent, do not pull on it    This can cause pain and bleeding  · Apply heat  on your lower back for 20 to 30 minutes every 2 hours for as many days as directed  Heat helps decrease pain and muscle spasms  · Drink liquids as directed  You may need to drink more liquid than usual  This will help flush any remaining small pieces of stone  Liquids can also help prevent more stones from forming  Ask how much liquid to drink each day and which liquids are best for you  Do not drink caffeine  · Rest  when you feel it is needed  Slowly start to do more each day  · Eat a variety of healthy foods  Ask if you need to make changes to the foods you eat  Healthy foods include fruits, vegetables, whole-grain breads, low-fat dairy products, beans, and fish  You may need to limit nuts, chocolate, coffee, and certain green leafy vegetables  You may also need to limit meat and salt  Follow up with your doctor as directed: You may need to return to have your stent removed  Write down your questions so you remember to ask them during your visits  © Copyright [address] Information is for End User's use only and may not be sold, redistributed or otherwise used for commercial purposes  All illustrations and images included in CareNotes® are the copyrighted property of A D A M [employer]  or [address] [place] [address]  The above information is an  only  It is not intended as medical advice for individual conditions or treatments  Talk to your doctor, nurse or pharmacist before following any medical regimen to see if it is safe and effective for you

## 2021-07-16 NOTE — INTERVAL H&P NOTE
H&P reviewed  After examining the patient I find no changes in the patients condition since the H&P had been written      Vitals:    07/16/21 0917   BP: 131/81   Pulse: 70   Resp: 20   Temp: (!) 96 6 °F (35 9 °C)   SpO2: 97%

## 2021-07-16 NOTE — OP NOTE
OPERATIVE REPORT  PATIENT NAME: Kailey Coles    :  1980  MRN: [id number]  Pt Location: [facility] OR ROOM 12    SURGERY DATE: 2021    Surgeon(s) and Role:     * Aspen Warren MD - Primary    Preop Diagnosis:  Calculus of kidney [N20 0]    Post-Op Diagnosis Codes:     * Calculus of kidney [N20 0]    Procedure(s) (LRB):  LITHOTRIPSY EXTRACORPORAL SHOCKWAVE (ESWL) (Right)    Specimen(s):  * No specimens in log *    Estimated Blood Loss:   None    Drains:  * No LDAs found *    Anesthesia Type:   General    Operative Indications:  Calculus of kidney [N20 0]  This is a 39-year-old female who presented with an obstructing right UPJ stone status post stent insertion  Options, procedures, benefits and risks were discussed and she agreed to the planned procedure  Operative Findings:  9 x 4 mm stone in the lower pole of the right kidney    Complications:   None    Procedure and Technique:  The patient was properly identified in the operating room and placed on the lithotripsy table in the supine position  After adequate general anesthesia the stone was imaged with fluoroscopy and placed in the focal point of the lithotripsy unit  Shockwave lithotripsy was performed from power levels 1 through 6 for a total of 3000 shocks  No residual stone was seen  She tolerated the procedure well and left the operating room in good condition     I was present for the entire procedure    Patient Disposition:  PACU     SIGNATURE: Aspen Warren MD  DATE: 2021  TIME: 11:04 AM

## 2021-07-21 ENCOUNTER — HOSPITAL ENCOUNTER (OUTPATIENT)
Dept: RADIOLOGY | Facility: HOSPITAL | Age: 41
Discharge: HOME | End: 2021-07-21
Attending: UROLOGY
Payer: COMMERCIAL

## 2021-07-21 DIAGNOSIS — N20.0 URIC ACID NEPHROLITHIASIS: ICD-10-CM

## 2021-07-21 PROCEDURE — 74018 RADEX ABDOMEN 1 VIEW: CPT

## 2021-08-04 ENCOUNTER — LAB REQUISITION (OUTPATIENT)
Dept: LAB | Facility: HOSPITAL | Age: 41
End: 2021-08-04
Payer: COMMERCIAL

## 2021-08-04 DIAGNOSIS — N20.0 CALCULUS OF KIDNEY: ICD-10-CM

## 2021-08-04 PROCEDURE — 82360 CALCULUS ASSAY QUANT: CPT | Performed by: UROLOGY

## 2021-08-13 LAB
CALCIUM OXALATE DIHYDRATE MFR STONE IR: 80 %
COLOR STONE: NORMAL
COM MFR STONE: 15 %
COMMENT-STONE3: NORMAL
COMPOSITION: NORMAL
HYDROXYAPATITE 24H ENGDIFF UR: 5 %
LABORATORY COMMENT REPORT: NORMAL
PHOTO: NORMAL
SIZE STONE: NORMAL MM
SPEC SOURCE SUBJ: NORMAL
STONE ANALYSIS-IMP: NORMAL
WT STONE: 2 MG

## 2021-09-04 ENCOUNTER — HOSPITAL ENCOUNTER (OUTPATIENT)
Dept: RADIOLOGY | Facility: HOSPITAL | Age: 41
Discharge: HOME | End: 2021-09-04
Attending: UROLOGY
Payer: COMMERCIAL

## 2021-09-04 ENCOUNTER — HOSPITAL ENCOUNTER (OUTPATIENT)
Dept: ULTRASOUND IMAGING | Facility: HOSPITAL | Age: 41
Discharge: HOME | End: 2021-09-04
Attending: UROLOGY
Payer: COMMERCIAL

## 2021-09-04 DIAGNOSIS — N20.0 URIC ACID NEPHROLITHIASIS: ICD-10-CM

## 2021-09-04 DIAGNOSIS — N20.0 KIDNEY STONE: ICD-10-CM

## 2021-09-04 PROCEDURE — 74018 RADEX ABDOMEN 1 VIEW: CPT

## 2021-09-04 PROCEDURE — 76770 US EXAM ABDO BACK WALL COMP: CPT

## 2021-10-01 ENCOUNTER — APPOINTMENT (OUTPATIENT)
Dept: LAB | Facility: HOSPITAL | Age: 41
End: 2021-10-01
Attending: PHYSICIAN ASSISTANT
Payer: COMMERCIAL

## 2021-10-01 DIAGNOSIS — N20.0 CALCULUS OF KIDNEY: ICD-10-CM

## 2021-10-01 DIAGNOSIS — K50.018 CROHN'S DISEASE OF SMALL INTESTINE WITH OTHER COMPLICATION (HCC): ICD-10-CM

## 2021-10-01 LAB
CRP SERPL QL: 11.7 MG/L
ERYTHROCYTE [SEDIMENTATION RATE] IN BLOOD: 14 MM/HOUR (ref 0–19)

## 2021-10-01 PROCEDURE — 85652 RBC SED RATE AUTOMATED: CPT

## 2021-10-01 PROCEDURE — 36415 COLL VENOUS BLD VENIPUNCTURE: CPT

## 2021-10-01 PROCEDURE — 86140 C-REACTIVE PROTEIN: CPT

## 2021-10-26 ENCOUNTER — APPOINTMENT (OUTPATIENT)
Dept: RADIOLOGY | Facility: CLINIC | Age: 41
End: 2021-10-26
Payer: COMMERCIAL

## 2021-10-26 ENCOUNTER — OFFICE VISIT (OUTPATIENT)
Dept: URGENT CARE | Facility: CLINIC | Age: 41
End: 2021-10-26
Payer: COMMERCIAL

## 2021-10-26 VITALS
DIASTOLIC BLOOD PRESSURE: 87 MMHG | TEMPERATURE: 97.9 F | RESPIRATION RATE: 20 BRPM | SYSTOLIC BLOOD PRESSURE: 141 MMHG | HEART RATE: 84 BPM | OXYGEN SATURATION: 98 %

## 2021-10-26 DIAGNOSIS — M25.531 RIGHT WRIST PAIN: Primary | ICD-10-CM

## 2021-10-26 DIAGNOSIS — M25.531 RIGHT WRIST PAIN: ICD-10-CM

## 2021-10-26 PROCEDURE — 99213 OFFICE O/P EST LOW 20 MIN: CPT | Performed by: FAMILY MEDICINE

## 2021-10-26 PROCEDURE — 73110 X-RAY EXAM OF WRIST: CPT

## 2021-10-26 RX ORDER — PREDNISONE 50 MG/1
50 TABLET ORAL DAILY
Qty: 5 TABLET | Refills: 0 | Status: SHIPPED | OUTPATIENT
Start: 2021-10-26 | End: 2021-10-31

## 2021-11-10 ENCOUNTER — HOSPITAL ENCOUNTER (OUTPATIENT)
Dept: MRI IMAGING | Facility: HOSPITAL | Age: 41
Discharge: HOME | End: 2021-11-10
Payer: COMMERCIAL

## 2021-11-10 DIAGNOSIS — R10.813 RIGHT LOWER QUADRANT ABDOMINAL TENDERNESS, REBOUND TENDERNESS PRESENCE NOT SPECIFIED: ICD-10-CM

## 2021-11-10 DIAGNOSIS — R79.82 ELEVATED C-REACTIVE PROTEIN (CRP): ICD-10-CM

## 2021-11-10 DIAGNOSIS — M25.50 PAIN IN JOINT, MULTIPLE SITES: ICD-10-CM

## 2021-11-10 DIAGNOSIS — K50.018 CROHN'S DISEASE OF SMALL INTESTINE WITH OTHER COMPLICATION (HCC): ICD-10-CM

## 2021-11-10 PROCEDURE — A9585 GADOBUTROL INJECTION: HCPCS | Performed by: PHYSICIAN ASSISTANT

## 2021-11-10 PROCEDURE — 74183 MRI ABD W/O CNTR FLWD CNTR: CPT

## 2021-11-10 PROCEDURE — 72197 MRI PELVIS W/O & W/DYE: CPT

## 2021-11-10 RX ADMIN — GADOBUTROL 8 ML: 604.72 INJECTION INTRAVENOUS at 09:20

## 2021-11-10 RX ADMIN — GLUCAGON HYDROCHLORIDE 1 MG: KIT at 09:30

## 2021-11-19 ENCOUNTER — OFFICE VISIT (OUTPATIENT)
Dept: URGENT CARE | Facility: CLINIC | Age: 41
End: 2021-11-19
Payer: COMMERCIAL

## 2021-11-19 ENCOUNTER — APPOINTMENT (OUTPATIENT)
Dept: RADIOLOGY | Facility: CLINIC | Age: 41
End: 2021-11-19
Payer: COMMERCIAL

## 2021-11-19 DIAGNOSIS — R05.9 COUGH: Primary | ICD-10-CM

## 2021-11-19 DIAGNOSIS — R05.9 COUGH: ICD-10-CM

## 2021-11-19 PROCEDURE — 71046 X-RAY EXAM CHEST 2 VIEWS: CPT

## 2021-11-19 PROCEDURE — U0003 INFECTIOUS AGENT DETECTION BY NUCLEIC ACID (DNA OR RNA); SEVERE ACUTE RESPIRATORY SYNDROME CORONAVIRUS 2 (SARS-COV-2) (CORONAVIRUS DISEASE [COVID-19]), AMPLIFIED PROBE TECHNIQUE, MAKING USE OF HIGH THROUGHPUT TECHNOLOGIES AS DESCRIBED BY CMS-2020-01-R: HCPCS | Performed by: NURSE PRACTITIONER

## 2021-11-19 PROCEDURE — 99214 OFFICE O/P EST MOD 30 MIN: CPT | Performed by: NURSE PRACTITIONER

## 2021-11-19 PROCEDURE — 87801 DETECT AGNT MULT DNA AMPLI: CPT | Performed by: NURSE PRACTITIONER

## 2021-11-19 PROCEDURE — U0005 INFEC AGEN DETEC AMPLI PROBE: HCPCS | Performed by: NURSE PRACTITIONER

## 2021-11-19 RX ORDER — DEXTROMETHORPHAN HYDROBROMIDE AND PROMETHAZINE HYDROCHLORIDE 15; 6.25 MG/5ML; MG/5ML
5 SOLUTION ORAL 4 TIMES DAILY PRN
Qty: 180 ML | Refills: 0 | Status: SHIPPED | OUTPATIENT
Start: 2021-11-19 | End: 2022-01-31 | Stop reason: ALTCHOICE

## 2021-11-19 RX ORDER — AZITHROMYCIN 250 MG/1
TABLET, FILM COATED ORAL
Qty: 6 TABLET | Refills: 0 | Status: SHIPPED | OUTPATIENT
Start: 2021-11-19 | End: 2021-11-23

## 2021-11-20 LAB — SARS-COV-2 RNA RESP QL NAA+PROBE: NEGATIVE

## 2021-11-22 LAB
B PARAPERT DNA SPEC QL NAA+PROBE: NOT DETECTED
B PERT DNA SPEC QL NAA+PROBE: NOT DETECTED

## 2021-12-07 PROCEDURE — U0005 INFEC AGEN DETEC AMPLI PROBE: HCPCS | Performed by: FAMILY MEDICINE

## 2021-12-07 PROCEDURE — U0003 INFECTIOUS AGENT DETECTION BY NUCLEIC ACID (DNA OR RNA); SEVERE ACUTE RESPIRATORY SYNDROME CORONAVIRUS 2 (SARS-COV-2) (CORONAVIRUS DISEASE [COVID-19]), AMPLIFIED PROBE TECHNIQUE, MAKING USE OF HIGH THROUGHPUT TECHNOLOGIES AS DESCRIBED BY CMS-2020-01-R: HCPCS | Performed by: FAMILY MEDICINE

## 2021-12-09 ENCOUNTER — OFFICE VISIT (OUTPATIENT)
Dept: FAMILY MEDICINE CLINIC | Facility: CLINIC | Age: 41
End: 2021-12-09
Payer: COMMERCIAL

## 2021-12-09 VITALS
WEIGHT: 197 LBS | OXYGEN SATURATION: 98 % | HEART RATE: 122 BPM | BODY MASS INDEX: 38.68 KG/M2 | SYSTOLIC BLOOD PRESSURE: 138 MMHG | RESPIRATION RATE: 18 BRPM | HEIGHT: 60 IN | TEMPERATURE: 99.4 F | DIASTOLIC BLOOD PRESSURE: 88 MMHG

## 2021-12-09 DIAGNOSIS — J30.2 SEASONAL ALLERGIES: Primary | ICD-10-CM

## 2021-12-09 PROCEDURE — 99203 OFFICE O/P NEW LOW 30 MIN: CPT | Performed by: FAMILY MEDICINE

## 2021-12-09 PROCEDURE — 1036F TOBACCO NON-USER: CPT | Performed by: FAMILY MEDICINE

## 2021-12-09 RX ORDER — PREDNISONE 10 MG/1
TABLET ORAL
Qty: 30 TABLET | Refills: 0 | Status: SHIPPED | OUTPATIENT
Start: 2021-12-09 | End: 2021-12-23 | Stop reason: ALTCHOICE

## 2021-12-23 ENCOUNTER — OFFICE VISIT (OUTPATIENT)
Dept: FAMILY MEDICINE CLINIC | Facility: CLINIC | Age: 41
End: 2021-12-23
Payer: COMMERCIAL

## 2021-12-23 VITALS
HEIGHT: 60 IN | SYSTOLIC BLOOD PRESSURE: 136 MMHG | BODY MASS INDEX: 38.76 KG/M2 | DIASTOLIC BLOOD PRESSURE: 78 MMHG | HEART RATE: 108 BPM | WEIGHT: 197.4 LBS | TEMPERATURE: 98.7 F | OXYGEN SATURATION: 97 %

## 2021-12-23 DIAGNOSIS — J30.2 SEASONAL ALLERGIES: Primary | ICD-10-CM

## 2021-12-23 DIAGNOSIS — Z23 ENCOUNTER FOR IMMUNIZATION: ICD-10-CM

## 2021-12-23 DIAGNOSIS — Z11.59 NEED FOR HEPATITIS C SCREENING TEST: ICD-10-CM

## 2021-12-23 DIAGNOSIS — Z11.4 SCREENING FOR HIV (HUMAN IMMUNODEFICIENCY VIRUS): ICD-10-CM

## 2021-12-23 PROCEDURE — 90715 TDAP VACCINE 7 YRS/> IM: CPT | Performed by: FAMILY MEDICINE

## 2021-12-23 PROCEDURE — 99213 OFFICE O/P EST LOW 20 MIN: CPT | Performed by: FAMILY MEDICINE

## 2021-12-23 PROCEDURE — 90471 IMMUNIZATION ADMIN: CPT | Performed by: FAMILY MEDICINE

## 2021-12-23 PROCEDURE — 3008F BODY MASS INDEX DOCD: CPT | Performed by: FAMILY MEDICINE

## 2021-12-23 RX ORDER — PREDNISONE 10 MG/1
TABLET ORAL
Qty: 30 TABLET | Refills: 0 | Status: SHIPPED | OUTPATIENT
Start: 2021-12-23 | End: 2022-01-31 | Stop reason: ALTCHOICE

## 2021-12-23 RX ORDER — LISINOPRIL 20 MG/1
TABLET ORAL
COMMUNITY
Start: 2021-12-09 | End: 2022-01-11 | Stop reason: SDUPTHER

## 2021-12-23 NOTE — PROGRESS NOTES
Assessment/Plan:    No problem-specific Assessment & Plan notes found for this encounter  Diagnoses and all orders for this visit:    Seasonal allergies  -     predniSONE 10 mg tablet; 4 pills for 3 days, then 3 pills for 3 days, then 2 pills for 3 days, then 1 pills for 3 days, then stop    Need for hepatitis C screening test  -     Hepatitis C Antibody ([facility]); Future    Screening for HIV (human immunodeficiency virus)  -     HIV 1/2 Antigen/Antibody (4th Generation) w Reflex [facility]; Future    Encounter for immunization  -     TDAP VACCINE GREATER THAN OR EQUAL TO 6YO IM    Other orders  -     lisinopril (ZESTRIL) 20 mg tablet          PHQ-2/9 Depression Screening    Little interest or pleasure in doing things: 0 - not at all  Feeling down, depressed, or hopeless: 0 - not at all  PHQ-2 Score: 0  PHQ-2 Interpretation: Negative depression screen               Subjective:      Patient ID: Saleem Shen is a 35 y o  female  Follow up for seasonal allergies pt went on zyrtec and oral steroids and had "50%" improvement      The following portions of the patient's history were reviewed and updated as appropriate: allergies, current medications, past family history, past medical history, past social history, past surgical history and problem list     Review of Systems   Constitutional: Negative for chills and fever  Respiratory: Negative for shortness of breath and wheezing  Cardiovascular: Negative for chest pain and palpitations  Objective:    /78 (BP Location: Left arm, Patient Position: Sitting)   Pulse (!) 108   Temp 98 7 °F (37 1 °C) (Tympanic)   Ht 5' (1 524 m)   Wt 89 5 kg (197 lb 6 4 oz)   LMP 12/21/2021   SpO2 97%   BMI 38 55 kg/m²      Physical Exam  Vitals and nursing note reviewed  Constitutional:       General: She is not in acute distress  Appearance: Normal appearance  She is not ill-appearing or diaphoretic  HENT:      Head: Normocephalic and atraumatic  Right Ear: Tympanic membrane, ear canal and external ear normal  There is no impacted cerumen  Left Ear: Tympanic membrane, ear canal and external ear normal  There is no impacted cerumen  Nose: Rhinorrhea present  No congestion  Mouth/Throat:      Mouth: Mucous membranes are moist       Pharynx: No oropharyngeal exudate or posterior oropharyngeal erythema  Eyes:      Conjunctiva/sclera: Conjunctivae normal    Cardiovascular:      Rate and Rhythm: Normal rate and regular rhythm  Heart sounds: Normal heart sounds  No murmur heard  Pulmonary:      Effort: Pulmonary effort is normal  No respiratory distress  Breath sounds: Normal breath sounds  No wheezing, rhonchi or rales  Musculoskeletal:      Cervical back: Normal range of motion and neck supple  No rigidity  Right lower leg: No edema  Left lower leg: No edema  Lymphadenopathy:      Cervical: No cervical adenopathy  Neurological:      Mental Status: She is alert and oriented to person, place, and time  Psychiatric:         Mood and Affect: Mood normal          Behavior: Behavior normal          Thought Content:  Thought content normal          Judgment: Judgment normal

## 2022-01-11 DIAGNOSIS — I10 PRIMARY HYPERTENSION: Primary | ICD-10-CM

## 2022-01-12 RX ORDER — AMLODIPINE BESYLATE 5 MG/1
5 TABLET ORAL DAILY
Qty: 90 TABLET | Refills: 1 | Status: SHIPPED | OUTPATIENT
Start: 2022-01-12 | End: 2022-07-06

## 2022-01-12 RX ORDER — LISINOPRIL 20 MG/1
20 TABLET ORAL DAILY
Qty: 90 TABLET | Refills: 1 | Status: SHIPPED | OUTPATIENT
Start: 2022-01-12 | End: 2022-07-06

## 2022-01-31 ENCOUNTER — OFFICE VISIT (OUTPATIENT)
Dept: FAMILY MEDICINE CLINIC | Facility: CLINIC | Age: 42
End: 2022-01-31
Payer: COMMERCIAL

## 2022-01-31 VITALS
DIASTOLIC BLOOD PRESSURE: 70 MMHG | TEMPERATURE: 99.2 F | HEART RATE: 108 BPM | OXYGEN SATURATION: 98 % | HEIGHT: 60 IN | SYSTOLIC BLOOD PRESSURE: 106 MMHG | WEIGHT: 203.38 LBS | BODY MASS INDEX: 39.93 KG/M2

## 2022-01-31 DIAGNOSIS — I10 PRIMARY HYPERTENSION: ICD-10-CM

## 2022-01-31 DIAGNOSIS — J30.2 SEASONAL ALLERGIES: Primary | ICD-10-CM

## 2022-01-31 PROCEDURE — 99213 OFFICE O/P EST LOW 20 MIN: CPT | Performed by: FAMILY MEDICINE

## 2022-01-31 PROCEDURE — 3008F BODY MASS INDEX DOCD: CPT | Performed by: FAMILY MEDICINE

## 2022-01-31 RX ORDER — NORETHINDRONE ACETATE, ETHINYL ESTRADIOL AND FERROUS FUMARATE 1MG-20(24)
KIT ORAL
COMMUNITY
Start: 2022-01-12

## 2022-01-31 RX ORDER — FLUTICASONE PROPIONATE 50 MCG
2 SPRAY, SUSPENSION (ML) NASAL DAILY
Qty: 18.2 ML | Refills: 6 | Status: SHIPPED | OUTPATIENT
Start: 2022-01-31

## 2022-01-31 NOTE — PROGRESS NOTES
Assessment/Plan:    No problem-specific Assessment & Plan notes found for this encounter  Diagnoses and all orders for this visit:    Seasonal allergies  Comments:  continue zyrtec and flonase  Orders:  -     fluticasone (FLONASE) 50 mcg/act nasal spray; 2 sprays into each nostril daily    Primary hypertension  -     CBC and differential; Future  -     Comprehensive metabolic panel; Future  -     Lipid panel; Future  -     TSH, 3rd generation with Free T4 reflex; Future    Other orders  -     Mibelas 24 Fe 1-20 MG-MCG(24) CHEW          PHQ-2/9 Depression Screening    Little interest or pleasure in doing things: 0 - not at all  Feeling down, depressed, or hopeless: 0 - not at all  PHQ-2 Score: 0  PHQ-2 Interpretation: Negative depression screen            Subjective:      Patient ID: Nilo Sylvester is a 35 y o  female  Follow up for seasonal allergies, pt went on a 2 week tapering course of steroids, pt feels much better and since disposing of her Marcelo tree      The following portions of the patient's history were reviewed and updated as appropriate: allergies, current medications, past family history, past medical history, past social history, past surgical history and problem list     Review of Systems   Constitutional: Negative for chills and fever  HENT: Negative for sneezing  Eyes: Negative for itching  Respiratory: Negative for cough, shortness of breath and wheezing  Objective:    /70 (BP Location: Left arm, Patient Position: Sitting, Cuff Size: Large)   Pulse (!) 108   Temp 99 2 °F (37 3 °C) (Tympanic)   Ht 5' (1 524 m)   Wt 92 3 kg (203 lb 6 oz)   SpO2 98%   BMI 39 72 kg/m²      Physical Exam  Vitals and nursing note reviewed  Constitutional:       General: She is not in acute distress  Appearance: Normal appearance  She is not ill-appearing, toxic-appearing or diaphoretic  HENT:      Head: Normocephalic and atraumatic        Right Ear: Tympanic membrane, ear canal and external ear normal  There is no impacted cerumen  Left Ear: Tympanic membrane, ear canal and external ear normal  There is no impacted cerumen  Nose: Nose normal  No congestion or rhinorrhea  Mouth/Throat:      Mouth: Mucous membranes are moist       Pharynx: No oropharyngeal exudate or posterior oropharyngeal erythema  Eyes:      General:         Right eye: No discharge  Left eye: No discharge  Pupils: Pupils are equal, round, and reactive to light  Cardiovascular:      Rate and Rhythm: Normal rate and regular rhythm  Heart sounds: Normal heart sounds  No murmur heard  Pulmonary:      Effort: Pulmonary effort is normal  No respiratory distress  Musculoskeletal:      Cervical back: Neck supple  No rigidity  Lymphadenopathy:      Cervical: No cervical adenopathy  Neurological:      Mental Status: She is alert and oriented to person, place, and time  Psychiatric:         Mood and Affect: Mood normal          Behavior: Behavior normal          Thought Content:  Thought content normal          Judgment: Judgment normal

## 2022-02-18 ENCOUNTER — APPOINTMENT (OUTPATIENT)
Dept: LAB | Facility: HOSPITAL | Age: 42
End: 2022-02-18
Attending: FAMILY MEDICINE
Payer: COMMERCIAL

## 2022-02-18 DIAGNOSIS — R79.82 ELEVATED C-REACTIVE PROTEIN (CRP): ICD-10-CM

## 2022-02-18 DIAGNOSIS — I10 PRIMARY HYPERTENSION: ICD-10-CM

## 2022-02-18 DIAGNOSIS — Z11.4 SCREENING FOR HIV (HUMAN IMMUNODEFICIENCY VIRUS): ICD-10-CM

## 2022-02-18 DIAGNOSIS — Z11.59 NEED FOR HEPATITIS C SCREENING TEST: ICD-10-CM

## 2022-02-18 DIAGNOSIS — K50.018 CROHN'S DISEASE OF SMALL INTESTINE WITH OTHER COMPLICATION (HCC): ICD-10-CM

## 2022-02-18 LAB
ALBUMIN SERPL BCP-MCNC: 3.8 G/DL (ref 3.5–5)
ALP SERPL-CCNC: 62 U/L (ref 46–116)
ALT SERPL W P-5'-P-CCNC: 18 U/L (ref 12–78)
ANION GAP SERPL CALCULATED.3IONS-SCNC: 6 MMOL/L (ref 4–13)
AST SERPL W P-5'-P-CCNC: 11 U/L (ref 5–45)
BASOPHILS # BLD AUTO: 0.04 THOUSANDS/ΜL (ref 0–0.1)
BASOPHILS NFR BLD AUTO: 0 % (ref 0–1)
BILIRUB SERPL-MCNC: 0.34 MG/DL (ref 0.2–1)
BUN SERPL-MCNC: 11 MG/DL (ref 5–25)
CALCIUM SERPL-MCNC: 9 MG/DL (ref 8.3–10.1)
CHLORIDE SERPL-SCNC: 107 MMOL/L (ref 100–108)
CHOLEST SERPL-MCNC: 205 MG/DL
CO2 SERPL-SCNC: 26 MMOL/L (ref 21–32)
CREAT SERPL-MCNC: 0.79 MG/DL (ref 0.6–1.3)
EOSINOPHIL # BLD AUTO: 0.05 THOUSAND/ΜL (ref 0–0.61)
EOSINOPHIL NFR BLD AUTO: 1 % (ref 0–6)
ERYTHROCYTE [DISTWIDTH] IN BLOOD BY AUTOMATED COUNT: 13 % (ref 11.6–15.1)
GFR SERPL CREATININE-BSD FRML MDRD: 91 ML/MIN/1.73SQ M
GLUCOSE P FAST SERPL-MCNC: 85 MG/DL (ref 65–99)
HCT VFR BLD AUTO: 41.5 % (ref 34.8–46.1)
HDLC SERPL-MCNC: 52 MG/DL
HGB BLD-MCNC: 13.6 G/DL (ref 11.5–15.4)
IMM GRANULOCYTES # BLD AUTO: 0.04 THOUSAND/UL (ref 0–0.2)
IMM GRANULOCYTES NFR BLD AUTO: 0 % (ref 0–2)
LDLC SERPL CALC-MCNC: 126 MG/DL (ref 0–100)
LYMPHOCYTES # BLD AUTO: 2.27 THOUSANDS/ΜL (ref 0.6–4.47)
LYMPHOCYTES NFR BLD AUTO: 25 % (ref 14–44)
MCH RBC QN AUTO: 28.4 PG (ref 26.8–34.3)
MCHC RBC AUTO-ENTMCNC: 32.8 G/DL (ref 31.4–37.4)
MCV RBC AUTO: 87 FL (ref 82–98)
MONOCYTES # BLD AUTO: 0.66 THOUSAND/ΜL (ref 0.17–1.22)
MONOCYTES NFR BLD AUTO: 7 % (ref 4–12)
NEUTROPHILS # BLD AUTO: 6.11 THOUSANDS/ΜL (ref 1.85–7.62)
NEUTS SEG NFR BLD AUTO: 67 % (ref 43–75)
NONHDLC SERPL-MCNC: 153 MG/DL
NRBC BLD AUTO-RTO: 0 /100 WBCS
PLATELET # BLD AUTO: 391 THOUSANDS/UL (ref 149–390)
PMV BLD AUTO: 9.3 FL (ref 8.9–12.7)
POTASSIUM SERPL-SCNC: 3.5 MMOL/L (ref 3.5–5.3)
PROT SERPL-MCNC: 7.5 G/DL (ref 6.4–8.2)
RBC # BLD AUTO: 4.79 MILLION/UL (ref 3.81–5.12)
SODIUM SERPL-SCNC: 139 MMOL/L (ref 136–145)
TRIGL SERPL-MCNC: 135 MG/DL
TSH SERPL DL<=0.05 MIU/L-ACNC: 2.69 UIU/ML (ref 0.36–3.74)
WBC # BLD AUTO: 9.17 THOUSAND/UL (ref 4.31–10.16)

## 2022-02-18 PROCEDURE — 86706 HEP B SURFACE ANTIBODY: CPT

## 2022-02-18 PROCEDURE — 85025 COMPLETE CBC W/AUTO DIFF WBC: CPT

## 2022-02-18 PROCEDURE — 86480 TB TEST CELL IMMUN MEASURE: CPT

## 2022-02-18 PROCEDURE — 80053 COMPREHEN METABOLIC PANEL: CPT

## 2022-02-18 PROCEDURE — 87389 HIV-1 AG W/HIV-1&-2 AB AG IA: CPT

## 2022-02-18 PROCEDURE — 80074 ACUTE HEPATITIS PANEL: CPT

## 2022-02-18 PROCEDURE — 36415 COLL VENOUS BLD VENIPUNCTURE: CPT

## 2022-02-18 PROCEDURE — 80061 LIPID PANEL: CPT

## 2022-02-18 PROCEDURE — 84443 ASSAY THYROID STIM HORMONE: CPT

## 2022-02-19 LAB
HAV IGM SER QL: NORMAL
HBV CORE IGM SER QL: NORMAL
HBV SURFACE AB SER-ACNC: 10.08 MIU/ML
HBV SURFACE AG SER QL: NORMAL
HCV AB SER QL: NORMAL

## 2022-02-20 LAB — HIV 1+2 AB+HIV1 P24 AG SERPL QL IA: NORMAL

## 2022-02-21 LAB
GAMMA INTERFERON BACKGROUND BLD IA-ACNC: 0.04 IU/ML
M TB IFN-G BLD-IMP: NEGATIVE
M TB IFN-G CD4+ BCKGRND COR BLD-ACNC: -0.01 IU/ML
M TB IFN-G CD4+ BCKGRND COR BLD-ACNC: -0.01 IU/ML
MITOGEN IGNF BCKGRD COR BLD-ACNC: >10 IU/ML

## 2022-03-22 ENCOUNTER — OFFICE VISIT (OUTPATIENT)
Dept: FAMILY MEDICINE CLINIC | Facility: CLINIC | Age: 42
End: 2022-03-22
Payer: COMMERCIAL

## 2022-03-22 VITALS
DIASTOLIC BLOOD PRESSURE: 80 MMHG | BODY MASS INDEX: 39.53 KG/M2 | HEIGHT: 60 IN | TEMPERATURE: 99.1 F | SYSTOLIC BLOOD PRESSURE: 132 MMHG | HEART RATE: 118 BPM | OXYGEN SATURATION: 98 % | WEIGHT: 201.38 LBS

## 2022-03-22 DIAGNOSIS — R52 BODY ACHES: ICD-10-CM

## 2022-03-22 DIAGNOSIS — R50.9 FEVER, UNSPECIFIED FEVER CAUSE: ICD-10-CM

## 2022-03-22 DIAGNOSIS — J06.9 VIRAL URI: Primary | ICD-10-CM

## 2022-03-22 DIAGNOSIS — R23.8 DRY SCALP: ICD-10-CM

## 2022-03-22 DIAGNOSIS — M54.50 ACUTE LEFT-SIDED LOW BACK PAIN WITHOUT SCIATICA: ICD-10-CM

## 2022-03-22 DIAGNOSIS — K50.018 CROHN'S DISEASE OF SMALL INTESTINE WITH OTHER COMPLICATION (HCC): ICD-10-CM

## 2022-03-22 LAB
SL AMB  POCT GLUCOSE, UA: ABNORMAL
SL AMB LEUKOCYTE ESTERASE,UA: ABNORMAL
SL AMB POCT BILIRUBIN,UA: ABNORMAL
SL AMB POCT BLOOD,UA: ABNORMAL
SL AMB POCT CLARITY,UA: CLEAR
SL AMB POCT COLOR,UA: ABNORMAL
SL AMB POCT KETONES,UA: ABNORMAL
SL AMB POCT NITRITE,UA: ABNORMAL
SL AMB POCT PH,UA: 5.5
SL AMB POCT SPECIFIC GRAVITY,UA: 1.03
SL AMB POCT URINE PROTEIN: 30
SL AMB POCT UROBILINOGEN: 0.2

## 2022-03-22 PROCEDURE — 3725F SCREEN DEPRESSION PERFORMED: CPT | Performed by: FAMILY MEDICINE

## 2022-03-22 PROCEDURE — 3008F BODY MASS INDEX DOCD: CPT | Performed by: FAMILY MEDICINE

## 2022-03-22 PROCEDURE — 81002 URINALYSIS NONAUTO W/O SCOPE: CPT | Performed by: FAMILY MEDICINE

## 2022-03-22 PROCEDURE — 99214 OFFICE O/P EST MOD 30 MIN: CPT | Performed by: FAMILY MEDICINE

## 2022-03-22 PROCEDURE — 1036F TOBACCO NON-USER: CPT | Performed by: FAMILY MEDICINE

## 2022-03-22 RX ORDER — BUDESONIDE 9 MG/1
CAPSULE ORAL
COMMUNITY
Start: 2022-03-21

## 2022-03-22 RX ORDER — CLOBETASOL PROPIONATE 0.05 MG/G
1 GEL TOPICAL DAILY
Qty: 30 G | Refills: 0 | Status: SHIPPED | OUTPATIENT
Start: 2022-03-22 | End: 2022-03-24

## 2022-03-22 NOTE — PROGRESS NOTES
Assessment/Plan:      Diagnoses and all orders for this visit:    Viral URI  Comments:  congestion, runny nose, subjective fevers, body aches  declines covid/flu swab  flonase daily, nasal saline  tylenol prn  return precautions discussed    Acute left-sided low back pain without sciatica  Comments:  advised heat, exercises  cant do nsaids do to crohns so tylenol prn  Patient plans to see her chiropracter and hence declined PT referral  Orders:  -     POCT urine dip    Body aches  -     POCT urine dip    Fever, unspecified fever cause  Comments:  UA negative for infxn but with protein and SG >1 030  advised to increase fluid intake to 80 ounces daily  pt advised to rpt Urine dip at next visit  Orders:  -     POCT urine dip    Crohn's disease of small intestine with other complication ([facility] [address] )  Comments:  denies change in bowel habits, blood  Denies abdominal pain  missed her infusion this week due to fevers  Dry scalp  Comments:  patient reports dry scalp  was on clobetasol 0 05% topical solution which helped  advised otc shampoo but patient declined  Orders:  -     clobetasol (TEMOVATE) 0 05 % GEL; Apply 1 application topically daily    Other orders  -     Ortikos 9 MG CP24          Return for Next scheduled follow up with pcp  The following portions of the patient's history were reviewed and updated as appropriate: allergies, current medications, past family history, past medical history, past social history, past surgical history, and problem list      Subjective:     Patient ID: Bob Velasquez is a 35 y o  female  Fever  This is a new problem  The current episode started in the past 7 days (saturday started with body aches and fatigue)  The problem has been rapidly worsening  Associated symptoms include arthralgias, congestion (left), fatigue, a fever (100) and myalgias  Pertinent negatives include no abdominal pain, change in bowel habit, coughing, sore throat (PND), urinary symptoms (none) or vomiting  She has tried acetaminophen for the symptoms  The treatment provided no relief  Reports history of kidney stones last year on the right  Reports this pain is different and had obstruction at the time  Back pain on left lower back started 12 days ago  No injuries  Tylenol for back pain and topicals bengay and heating pad which have not helped  Drinking only little bit of water  Low left sided SI joint pain  Denies midline tenderness  No extremity weakness, saddle anesthesia, bowel or bladder dysfunction  No sick contacts at home  Reports sick contacts at work  Negative home test for covid yesterday  Declines covid/flu testing in office for confirmation  Patient reports flu shot in 11/21 and Curlee Kansky without a booster  PHQ-9 Depression Screening    Little interest or pleasure in doing things: 0 - not at all  Feeling down, depressed, or hopeless: 0 - not at all          Current Outpatient Medications on File Prior to Visit   Medication Sig Dispense Refill    amLODIPine (NORVASC) 5 mg tablet Take 1 tablet (5 mg total) by mouth daily 90 tablet 1    Cholecalciferol (VITAMIN D3 PO) Take 4,000 Units by mouth      fluticasone (FLONASE) 50 mcg/act nasal spray 2 sprays into each nostril daily 18 2 mL 6    lisinopril (ZESTRIL) 20 mg tablet Take 1 tablet (20 mg total) by mouth daily 90 tablet 1    mesalamine (LIALDA) 1 2 g EC tablet Take 1,200 mg by mouth 4 (four) times a day      Mibelas 24 Fe 1-20 MG-MCG(24) CHEW       Multiple Vitamin (multivitamin) tablet Take 1 tablet by mouth daily      Ortikos 9 MG CP24       norgestrel-ethinyl estradiol (LO/OVRAL) 0 3 mg-30 mcg per tablet Take 1 tablet by mouth daily (Patient not taking: Reported on 1/31/2022 )       No current facility-administered medications on file prior to visit  Review of Systems   Constitutional: Positive for fatigue and fever (100)  HENT: Positive for congestion (left)  Negative for sore throat (PND)  Respiratory: Negative for cough  Gastrointestinal: Negative for abdominal pain, change in bowel habit and vomiting  Musculoskeletal: Positive for arthralgias and myalgias  Objective:    Vitals:    03/22/22 1007   BP: 132/80   BP Location: Left arm   Patient Position: Sitting   Cuff Size: Large   Pulse: (!) 118   Temp: 99 1 °F (37 3 °C)   TempSrc: Tympanic   SpO2: 98%   Weight: 91 3 kg (201 lb 6 oz)   Height: 5' (1 524 m)         Physical Exam  Vitals and nursing note reviewed  Constitutional:       General: She is not in acute distress  Appearance: Normal appearance  She is not ill-appearing or toxic-appearing  HENT:      Head: Normocephalic and atraumatic  Right Ear: Hearing, ear canal and external ear normal  A middle ear effusion is present  Left Ear: Hearing, ear canal and external ear normal  A middle ear effusion is present  Nose: Congestion (L) and rhinorrhea present  Right Turbinates: Swollen  Left Turbinates: Swollen  Right Sinus: No maxillary sinus tenderness or frontal sinus tenderness  Left Sinus: No maxillary sinus tenderness or frontal sinus tenderness  Mouth/Throat:      Mouth: Mucous membranes are moist       Pharynx: Oropharynx is clear  Posterior oropharyngeal erythema present  No oropharyngeal exudate  Eyes:      General: No scleral icterus  Right eye: No discharge  Left eye: No discharge  Extraocular Movements: Extraocular movements intact  Conjunctiva/sclera: Conjunctivae normal       Pupils: Pupils are equal, round, and reactive to light  Cardiovascular:      Rate and Rhythm: Regular rhythm  Tachycardia present  Heart sounds: Normal heart sounds  No murmur heard  No friction rub  No gallop  Pulmonary:      Effort: Pulmonary effort is normal  No respiratory distress  Breath sounds: Normal breath sounds  No wheezing or rales  Abdominal:      General: Bowel sounds are normal       Palpations: Abdomen is soft        Tenderness: There is no abdominal tenderness  There is no right CVA tenderness, left CVA tenderness, guarding or rebound  Musculoskeletal:      Lumbar back: Tenderness (SI joint) present  No swelling, spasms or bony tenderness  Back:       Right lower leg: No edema  Left lower leg: No edema  Lymphadenopathy:      Cervical: No cervical adenopathy  Right cervical: No superficial or deep cervical adenopathy  Left cervical: No superficial or deep cervical adenopathy  Neurological:      Mental Status: She is alert

## 2022-03-24 ENCOUNTER — TELEPHONE (OUTPATIENT)
Dept: FAMILY MEDICINE CLINIC | Facility: CLINIC | Age: 42
End: 2022-03-24

## 2022-03-24 DIAGNOSIS — R23.8 DRY SCALP: Primary | ICD-10-CM

## 2022-03-24 RX ORDER — CLOBETASOL PROPIONATE 0.46 MG/ML
SOLUTION TOPICAL DAILY
Qty: 50 ML | Refills: 0 | Status: SHIPPED | OUTPATIENT
Start: 2022-03-24

## 2022-03-24 NOTE — TELEPHONE ENCOUNTER
Pt called and said she got the clobetasol gel for her scalp but the gel doesn't absorb as well as the clobetasol Topical solution  Pt was wondering if your able to send over the clobetasol topical solution to [address] [place] [address] because she says that's what's works best for her

## 2022-04-21 ENCOUNTER — TELEPHONE (OUTPATIENT)
Dept: FAMILY MEDICINE CLINIC | Facility: CLINIC | Age: 42
End: 2022-04-21

## 2022-04-22 ENCOUNTER — TELEMEDICINE (OUTPATIENT)
Dept: FAMILY MEDICINE CLINIC | Facility: CLINIC | Age: 42
End: 2022-04-22
Payer: COMMERCIAL

## 2022-04-22 DIAGNOSIS — U07.1 COVID-19: Primary | ICD-10-CM

## 2022-04-22 LAB — SARS-COV-2 AG UPPER RESP QL IA: ABNORMAL

## 2022-04-22 PROCEDURE — 1036F TOBACCO NON-USER: CPT | Performed by: FAMILY MEDICINE

## 2022-04-22 PROCEDURE — 99214 OFFICE O/P EST MOD 30 MIN: CPT | Performed by: FAMILY MEDICINE

## 2022-04-22 NOTE — PROGRESS NOTES
COVID-19 Outpatient Progress Note    Assessment/Plan:    Problem List Items Addressed This Visit     None      Visit Diagnoses     COVID-19    -  Primary    Relevant Medications    nirmatrelvir & ritonavir (Paxlovid) tablet therapy pack       I called the patients Gastroenterologist called and reports no concerns with treatment for covid paxlovid in regards to her crohns treatment  FDA website also reviewed  Patient advised of interactions with budesonide despite reporting this is not systemic medicine and advised on interaction with OCP, risk of spotting or risk of incomplete contraception and risk of pregnancy so to refrain given illness and wear condom if active  Patients [facility] called and they report they do not have any covid treatment  Called [facility] [address] which reports they have the medicine so patient advised medication sent to a different pharmacy  Disposition:     Patient is fully vaccinated and I recommended self quarantine for 5 days followed by strict mask use for an additional 5 days  If patient were to develop symptoms, they should immediately self isolate and call our office for further guidance  Discussed symptom directed medication options with patient  Discussed vitamin D, vitamin C, and/or zinc supplementation with patient  Discussed risks, benefits, and side effects of inhaled corticosteroids and they agree to treatment  Patient meets criteria for PAXLOVID and they have been counseled appropriately according to EUA documentation released by the FDA  After discussion, patient agrees to treatment  Matthew Da Silva is an investigational medicine used to treat mild-to-moderate COVID-19 in adults and children (15years of age and older weighing at least 80 pounds (40 kg)) with positive results of direct SARS-CoV-2 viral testing, and who are at high risk for progression to severe COVID-19, including hospitalization or death   PAXLOVID is investigational because it is still being studied  There is limited information about the safety and effectiveness of using PAXLOVID to treat people with mild-to-moderate COVID-19  The FDA has authorized the emergency use of PAXLOVID for the treatment of mild-tomoderate COVID-19 in adults and children (15years of age and older weighing at least 80 pounds (40 kg)) with a positive test for the virus that causes COVID-19, and who are at high risk for progression to severe COVID-19, including hospitalization or death, under an EUA  What should I tell my healthcare provider before I take PAXLOVID? Tell your healthcare provider if you:  - Have any allergies  - Have liver or kidney disease  - Are pregnant or plan to become pregnant  - Are breastfeeding a child  - Have any serious illnesses    Tell your healthcare provider about all the medicines you take, including prescription and over-the-counter medicines, vitamins, and herbal supplements  Some medicines may interact with PAXLOVID and may cause serious side effects  Keep a list of your medicines to show your healthcare provider and pharmacist when you get a new medicine  You can ask your healthcare provider or pharmacist for a list of medicines that interact with PAXLOVID  Do not start taking a new medicine without telling your healthcare provider  Your healthcare provider can tell you if it is safe to take PAXLOVID with other medicines  Tell your healthcare provider if you are taking combined hormonal contraceptive  PAXLOVID may affect how your birth control pills work  Females who are able to become pregnant should use another effective alternative form of contraception or an additional barrier method of contraception  Talk to your healthcare provider if you have any questions about contraceptive methods that might be right for you  How do I take PAXLOVID? PAXLOVID consists of 2 medicines: nirmatrelvir and ritonavir    - Take 2 pink tablets of nirmatrelvir with 1 white tablet of ritonavir by mouth 2 times each day (in the morning and in the evening) for 5 days  For each dose, take all 3 tablets at the same time  - If you have kidney disease, talk to your healthcare provider  You may need a different dose  - Swallow the tablets whole  Do not chew, break, or crush the tablets  - Take PAXLOVID with or without food  - Do not stop taking PAXLOVID without talking to your healthcare provider, even if you feel better  - If you miss a dose of PAXLOVID within 8 hours of the time it is usually taken, take it as soon as you remember  If you miss a dose by more than 8 hours, skip the missed dose and take the next dose at your regular time  Do not take 2 doses of PAXLOVID at the same time  - If you take too much PAXLOVID, call your healthcare provider or go to the nearest hospital emergency room right away  - If you are taking a ritonavir- or cobicistat-containing medicine to treat hepatitis C or Human Immunodeficiency Virus (HIV), you should continue to take your medicine as prescribed by your healthcare provider   - Talk to your healthcare provider if you do not feel better or if you feel worse after 5 days  Who should generally not take PAXLOVID? Do not take PAXLOVID if:  You are allergic to nirmatrelvir, ritonavir, or any of the ingredients in PAXLOVID  You are taking any of the following medicines:  - Alfuzosin  - Pethidine, piroxicam, propoxyphene  - Ranolazine  - Amiodarone, dronedarone, flecainide, propafenone, quinidine  - Colchicine  - Lurasidone, pimozide, clozapine  - Dihydroergotamine, ergotamine, methylergonovine  - Lovastatin, simvastatin  - Sildenafil (Revatio®) for pulmonary arterial hypertension (PAH)  - Triazolam, oral midazolam  - Apalutamide  - Carbamazepine, phenobarbital, phenytoin  - Rifampin  - St  Fabiáns Wort (hypericum perforatum)    What are the important possible side effects of PAXLOVID? Possible side effects of PAXLOVID are:  - Liver Problems   Tell your healthcare provider right away if you have any of these signs and symptoms of liver problems: loss of appetite, yellowing of your skin and the whites of eyes (jaundice), dark-colored urine, pale colored stools and itchy skin, stomach area (abdominal) pain  - Resistance to HIV Medicines  If you have untreated HIV infection, PAXLOVID may lead to some HIV medicines not working as well in the future  - Other possible side effects include: altered sense of taste, diarrhea, high blood pressure, or muscle aches    These are not all the possible side effects of PAXLOVID  Not many people have taken PAXLOVID  Serious and unexpected side effects may happen  Pablo Portal is still being studied, so it is possible that all of the risks are not known at this time  What other treatment choices are there? Like Karma Quijano may allow for the emergency use of other medicines to treat people with COVID-19  Go to https://Flats[employer]Houses/ for information on the emergency use of other medicines that are authorized by FDA to treat people with COVID-19  Your healthcare provider may talk with you about clinical trials for which you may be eligible  It is your choice to be treated or not to be treated with PAXLOVID  Should you decide not to receive it or for your child not to receive it, it will not change your standard medical care  What if I am pregnant or breastfeeding? There is no experience treating pregnant women or breastfeeding mothers with PAXLOVID  For a mother and unborn baby, the benefit of taking PAXLOVID may be greater than the risk from the treatment  If you are pregnant, discuss your options and specific situation with your healthcare provider  It is recommended that you use effective barrier contraception or do not have sexual activity while taking PAXLOVID      If you are breastfeeding, discuss your options and specific situation with your healthcare provider  How do I report side effects with PAXLOVID? Contact your healthcare provider if you have any side effects that bother you or do not go away  Report side effects to FDA MedWatch at www fda gov/medwatch or call 6-203-WIL4692 or you can report side effects to [facility]  at the contact information provided below  Website Fax number Telephone number   [employer] [phone]     How should I store [address]? Store PAXLOVID tablets at room temperature between 68°F to 77°F (20°C to 25°C)  Full fact sheet for patients, parents, and caregivers can be found at: Tamionrobert rivas    I have spent 16 minutes directly with the patient  Greater than 50% of this time was spent in counseling/coordination of care regarding: risks and benefits of treatment options, instructions for management and importance of treatment compliance  Encounter provider Italo Ortiz MD    Provider located at [address] [place] [address],[facility] [id number] [phone]    Recent Visits  Date Type Provider Dept   04/21/22 Telephone Tank Harris   Showing recent visits within past 7 days and meeting all other requirements  Today's Visits  Date Type Provider Dept   04/22/22 Telemedicine MD Robert Briggs   Showing today's visits and meeting all other requirements  Future Appointments  No visits were found meeting these conditions  Showing future appointments within next 150 days and meeting all other requirements       Patient agrees to participate in a virtual check in via telephone or video visit instead of presenting to the office to address urgent/immediate medical needs  Patient is aware this is a billable service  After connecting through [facility], the patient was identified by name and date of birth   Adia Phillipjessica was informed that this was a telemedicine visit and that the exam was being conducted confidentially over secure lines  My office door was closed  No one else was in the room  Marilin Morgan acknowledged consent and understanding of privacy and security of the telemedicine visit  I informed the patient that I have reviewed her record in Epic and presented the opportunity for her to ask any questions regarding the visit today  The patient agreed to participate  Verification of patient location:  Patient is located in the following state in which I hold an active license: [place]    Subjective:   Shelton Ahumada is a 35 y o  female who is concerned about COVID-19  Patient's symptoms include fever (102 2), nasal congestion, rhinorrhea, sore throat, cough and headache  Patient denies anosmia, loss of taste, shortness of breath, chest tightness and myalgias       - Date of symptom onset: 4/20/2022      COVID-19 vaccination status: Fully vaccinated (primary series)    Exposure:   Contact with a person who is under investigation (PUI) for or who is positive for COVID-19 within the last 14 days?: No    Hospitalized recently for fever and/or lower respiratory symptoms?: No      Currently a healthcare worker that is involved in direct patient care?: No      Works in a special setting where the risk of COVID-19 transmission may be high? (this may include long-term care, correctional and intermediate facilities; homeless shelters; assisted-living facilities and group homes ): No      Resident in a special setting where the risk of COVID-19 transmission may be high? (this may include long-term care, correctional and intermediate facilities; homeless shelters; assisted-living facilities and group homes ): No      Lab Results   Component Value Date    SARSCOV2 Negative 12/07/2021    [employer] Not Detected 01/15/2021    Invieo Positive (Patient Reported) (A) 04/21/2022     Past Medical History:   Diagnosis Date    COVID-19 vaccine administered     [employer] in March 2021    Crohn disease ([facility] [address] )     Hypertension     IBS (irritable bowel syndrome)     Inflammatory bowel disease 2003    Crohns dx 11/2020    Kidney stone June 2021    Lithotripsy done 7/2021    Obstruction of right ureteropelvic junction due to stone     PONV (postoperative nausea and vomiting)     Pt requests to be pre-medicated for N/V prior to sx    Wears glasses      Past Surgical History:   Procedure Laterality Date    CHOLECYSTECTOMY      NC CYSTOURETHROSCOPY,URETER CATHETER Right 6/18/2021    Procedure: CYSTOSCOPY RETROGRADE PYELOGRAM WITH INSERTION STENT URETERAL;  Surgeon: Chelo Charles MD;  Location: [address] [place] [address] MAIN OR;  Service: Urology    [facility] ESWL Right 7/16/2021    Procedure: LITHOTRIPSY EXTRACORPORAL SHOCKWAVE (ESWL);   Surgeon: Chelo Charles MD;  Location:  MAIN OR;  Service: Urology    TONSILLECTOMY       Current Outpatient Medications   Medication Sig Dispense Refill    amLODIPine (NORVASC) 5 mg tablet Take 1 tablet (5 mg total) by mouth daily 90 tablet 1    Cholecalciferol (VITAMIN D3 PO) Take 4,000 Units by mouth      clobetasol (TEMOVATE) 0 05 % external solution Apply topically in the morning 50 mL 0    fluticasone (FLONASE) 50 mcg/act nasal spray 2 sprays into each nostril daily 18 2 mL 6    lisinopril (ZESTRIL) 20 mg tablet Take 1 tablet (20 mg total) by mouth daily 90 tablet 1    mesalamine (LIALDA) 1 2 g EC tablet Take 1,200 mg by mouth 4 (four) times a day      Mibelas 24 Fe 1-20 MG-MCG(24) CHEW       Multiple Vitamin (multivitamin) tablet Take 1 tablet by mouth daily      nirmatrelvir & ritonavir (Paxlovid) tablet therapy pack Take 3 tablets by mouth 2 (two) times a day for 5 days Take 2 nirmatrelvir tablets + 1 ritonavir tablet together per dose 30 tablet 0    norgestrel-ethinyl estradiol (LO/OVRAL) 0 3 mg-30 mcg per tablet Take 1 tablet by mouth daily (Patient not taking: Reported on 1/31/2022 )      Ortikos 9 MG CP24        No current facility-administered medications for this visit  No Known Allergies    Review of Systems   Constitutional: Positive for fever (102 2)  HENT: Positive for congestion, rhinorrhea and sore throat  Respiratory: Positive for cough  Negative for chest tightness and shortness of breath  Musculoskeletal: Negative for myalgias  Neurological: Positive for headaches  Objective: There were no vitals filed for this visit  Physical Exam  Constitutional:       General: She is not in acute distress  Appearance: Normal appearance  She is not ill-appearing, toxic-appearing or diaphoretic  HENT:      Head: Normocephalic and atraumatic  Right Ear: External ear normal       Left Ear: External ear normal    Eyes:      General: No scleral icterus  Right eye: No discharge  Left eye: No discharge  Extraocular Movements: Extraocular movements intact  Conjunctiva/sclera: Conjunctivae normal    Pulmonary:      Effort: Pulmonary effort is normal  No respiratory distress  Neurological:      Mental Status: She is alert  VIRTUAL VISIT DISCLAIMER    Marilin Morgan verbally agrees to participate in [employer]  Pt is aware that [employer] could be limited without vital signs or the ability to perform a full hands-on physical exam  Marilin Morgan understands she or the provider may request at any time to terminate the video visit and request the patient to seek care or treatment in person

## 2022-07-05 ENCOUNTER — APPOINTMENT (EMERGENCY)
Dept: CT IMAGING | Facility: HOSPITAL | Age: 42
End: 2022-07-05
Payer: COMMERCIAL

## 2022-07-05 ENCOUNTER — HOSPITAL ENCOUNTER (EMERGENCY)
Facility: HOSPITAL | Age: 42
Discharge: HOME | End: 2022-07-05
Attending: EMERGENCY MEDICINE
Payer: COMMERCIAL

## 2022-07-05 VITALS
WEIGHT: 202 LBS | HEIGHT: 60 IN | DIASTOLIC BLOOD PRESSURE: 91 MMHG | SYSTOLIC BLOOD PRESSURE: 140 MMHG | TEMPERATURE: 98.9 F | BODY MASS INDEX: 39.66 KG/M2 | OXYGEN SATURATION: 98 % | RESPIRATION RATE: 16 BRPM | HEART RATE: 90 BPM

## 2022-07-05 DIAGNOSIS — R11.2 NAUSEA AND VOMITING: Primary | ICD-10-CM

## 2022-07-05 DIAGNOSIS — K50.90 CROHN'S DISEASE (HCC): ICD-10-CM

## 2022-07-05 DIAGNOSIS — R10.9 ABDOMINAL PAIN: ICD-10-CM

## 2022-07-05 LAB
ALBUMIN SERPL BCP-MCNC: 4.2 G/DL (ref 3.5–5)
ALP SERPL-CCNC: 49 U/L (ref 34–104)
ALT SERPL W P-5'-P-CCNC: 24 U/L (ref 7–52)
ANION GAP SERPL CALCULATED.3IONS-SCNC: 8 MMOL/L (ref 4–13)
AST SERPL W P-5'-P-CCNC: 19 U/L (ref 13–39)
BASOPHILS # BLD AUTO: 0.03 THOUSANDS/ΜL (ref 0–0.1)
BASOPHILS NFR BLD AUTO: 0 % (ref 0–1)
BILIRUB SERPL-MCNC: 0.54 MG/DL (ref 0.2–1)
BILIRUB UR QL STRIP: ABNORMAL
BUN SERPL-MCNC: 10 MG/DL (ref 5–25)
CALCIUM SERPL-MCNC: 9.1 MG/DL (ref 8.4–10.2)
CHLORIDE SERPL-SCNC: 102 MMOL/L (ref 96–108)
CLARITY UR: ABNORMAL
CO2 SERPL-SCNC: 26 MMOL/L (ref 21–32)
COLOR UR: ABNORMAL
CREAT SERPL-MCNC: 0.77 MG/DL (ref 0.6–1.3)
EOSINOPHIL # BLD AUTO: 0.05 THOUSAND/ΜL (ref 0–0.61)
EOSINOPHIL NFR BLD AUTO: 1 % (ref 0–6)
ERYTHROCYTE [DISTWIDTH] IN BLOOD BY AUTOMATED COUNT: 12.8 % (ref 11.6–15.1)
GFR SERPL CREATININE-BSD FRML MDRD: 94 ML/MIN/1.73SQ M
GLUCOSE SERPL-MCNC: 94 MG/DL (ref 65–140)
GLUCOSE UR STRIP-MCNC: NEGATIVE MG/DL
HCT VFR BLD AUTO: 44 % (ref 34.8–46.1)
HGB BLD-MCNC: 14.3 G/DL (ref 11.5–15.4)
HGB UR QL STRIP.AUTO: NEGATIVE
IMM GRANULOCYTES # BLD AUTO: 0.03 THOUSAND/UL (ref 0–0.2)
IMM GRANULOCYTES NFR BLD AUTO: 0 % (ref 0–2)
KETONES UR STRIP-MCNC: ABNORMAL MG/DL
LEUKOCYTE ESTERASE UR QL STRIP: NEGATIVE
LIPASE SERPL-CCNC: 14 U/L (ref 11–82)
LYMPHOCYTES # BLD AUTO: 2.14 THOUSANDS/ΜL (ref 0.6–4.47)
LYMPHOCYTES NFR BLD AUTO: 26 % (ref 14–44)
MCH RBC QN AUTO: 28.5 PG (ref 26.8–34.3)
MCHC RBC AUTO-ENTMCNC: 32.5 G/DL (ref 31.4–37.4)
MCV RBC AUTO: 88 FL (ref 82–98)
MONOCYTES # BLD AUTO: 0.69 THOUSAND/ΜL (ref 0.17–1.22)
MONOCYTES NFR BLD AUTO: 9 % (ref 4–12)
NEUTROPHILS # BLD AUTO: 5.22 THOUSANDS/ΜL (ref 1.85–7.62)
NEUTS SEG NFR BLD AUTO: 64 % (ref 43–75)
NITRITE UR QL STRIP: NEGATIVE
NRBC BLD AUTO-RTO: 0 /100 WBCS
PH UR STRIP.AUTO: 6 [PH]
PLATELET # BLD AUTO: 378 THOUSANDS/UL (ref 149–390)
PMV BLD AUTO: 9 FL (ref 8.9–12.7)
POTASSIUM SERPL-SCNC: 3.8 MMOL/L (ref 3.5–5.3)
PROT SERPL-MCNC: 7.3 G/DL (ref 6.4–8.4)
PROT UR STRIP-MCNC: NEGATIVE MG/DL
RBC # BLD AUTO: 5.01 MILLION/UL (ref 3.81–5.12)
SODIUM SERPL-SCNC: 136 MMOL/L (ref 135–147)
SP GR UR STRIP.AUTO: >=1.03 (ref 1–1.03)
UROBILINOGEN UR QL STRIP.AUTO: 1 E.U./DL
WBC # BLD AUTO: 8.16 THOUSAND/UL (ref 4.31–10.16)

## 2022-07-05 PROCEDURE — 99284 EMERGENCY DEPT VISIT MOD MDM: CPT | Performed by: EMERGENCY MEDICINE

## 2022-07-05 PROCEDURE — 83690 ASSAY OF LIPASE: CPT | Performed by: EMERGENCY MEDICINE

## 2022-07-05 PROCEDURE — 36415 COLL VENOUS BLD VENIPUNCTURE: CPT

## 2022-07-05 PROCEDURE — G1004 CDSM NDSC: HCPCS

## 2022-07-05 PROCEDURE — 85025 COMPLETE CBC W/AUTO DIFF WBC: CPT | Performed by: EMERGENCY MEDICINE

## 2022-07-05 PROCEDURE — 74176 CT ABD & PELVIS W/O CONTRAST: CPT

## 2022-07-05 PROCEDURE — 81003 URINALYSIS AUTO W/O SCOPE: CPT | Performed by: EMERGENCY MEDICINE

## 2022-07-05 PROCEDURE — 80053 COMPREHEN METABOLIC PANEL: CPT | Performed by: EMERGENCY MEDICINE

## 2022-07-05 PROCEDURE — 99284 EMERGENCY DEPT VISIT MOD MDM: CPT

## 2022-07-05 NOTE — ED PROVIDER NOTES
History  Chief Complaint   Patient presents with    Abdominal Pain     On and off for a few weeks  History of crohns  Worse since Thursday  Increased nausea and vomiting  Thin, loose stools  No blood  HPI      This is a very pleasant, nontoxic, 17-year-old female presents the emergency department with intermittent generalized abdominal pain, cramping times week and half  History of Crohn's disease  Intermittent pain is been worse over the last 5 days  Yesterday patient started with increased nausea and vomiting  Patient's stools are nonbloody but noticed that they are now liquid in nature and looser than normal and have increased amount of mucus present  Patient contact her GI specialist when there was an onset of back pain they became concerned advised patient he to noted emergency department rule out obstruction  No prior history of bowel obstruction    Prior history of Crohn disease, seasonal allergies and kidney stones a subsequent the trip see  and 2021  Prior to Admission Medications   Prescriptions Last Dose Informant Patient Reported? Taking?    Cholecalciferol (VITAMIN D3 PO)  Self Yes No   Sig: Take 4,000 Units by mouth   Mibelas 24 Fe 1-20 MG-MCG(24) CHEW  Self Yes No   Multiple Vitamin (multivitamin) tablet  Self Yes No   Sig: Take 1 tablet by mouth daily   Ortikos 9 MG CP24  Self Yes No   amLODIPine (NORVASC) 5 mg tablet  Self No No   Sig: Take 1 tablet (5 mg total) by mouth daily   clobetasol (TEMOVATE) 0 05 % external solution   No No   Sig: Apply topically in the morning   fluticasone (FLONASE) 50 mcg/act nasal spray  Self No No   Si sprays into each nostril daily   lisinopril (ZESTRIL) 20 mg tablet  Self No No   Sig: Take 1 tablet (20 mg total) by mouth daily   mesalamine (LIALDA) 1 2 g EC tablet  Self Yes No   Sig: Take 1,200 mg by mouth 4 (four) times a day   norgestrel-ethinyl estradiol (LO/OVRAL) 0 3 mg-30 mcg per tablet  Self Yes No   Sig: Take 1 tablet by mouth daily Patient not taking: Reported on 1/31/2022       Facility-Administered Medications: None       Past Medical History:   Diagnosis Date    COVID-19 vaccine administered     [employer] in March 2021    Crohn disease ([address] )     Hypertension     IBS (irritable bowel syndrome)     Inflammatory bowel disease 2003    Crohns dx 11/2020    Kidney stone June 2021    Lithotripsy done 7/2021    Obstruction of right ureteropelvic junction due to stone     PONV (postoperative nausea and vomiting)     Pt requests to be pre-medicated for N/V prior to sx    Wears glasses        Past Surgical History:   Procedure Laterality Date    CHOLECYSTECTOMY      [place] CYSTOURETHROSCOPY,URETER CATHETER Right 6/18/2021    Procedure: CYSTOSCOPY RETROGRADE PYELOGRAM WITH INSERTION STENT URETERAL;  Surgeon: Linn Florentino MD;  Location[facility] MAIN OR;  Service: Urology    [facility] ESWL Right 7/16/2021    Procedure: LITHOTRIPSY EXTRACORPORAL SHOCKWAVE (ESWL); Surgeon: Linn Florentino MD;  Location:  MAIN OR;  Service: Urology    TONSILLECTOMY         Family History   Problem Relation Age of Onset    Diabetes Mother     Hypertension Father     Heart attack Father     No Known Problems Maternal Grandmother     No Known Problems Maternal Grandfather     No Known Problems Paternal Grandmother     No Known Problems Paternal Grandfather     No Known Problems Maternal Aunt     No Known Problems Maternal Aunt     Breast cancer Paternal Aunt 36     I have reviewed and agree with the history as documented  E-Cigarette/Vaping    E-Cigarette Use Never User      E-Cigarette/Vaping Substances     Social History     Tobacco Use    Smoking status: Never Smoker    Smokeless tobacco: Never Used   Vaping Use    Vaping Use: Never used   Substance Use Topics    Alcohol use: Yes     Alcohol/week: 0 0 standard drinks     Comment: Social drinker    Drug use: Never       Review of Systems   Constitutional: Negative  HENT: Negative  Eyes: Negative  Respiratory: Negative  Cardiovascular: Negative  Gastrointestinal: Negative  Endocrine: Negative  Genitourinary: Negative  Musculoskeletal: Negative  Skin: Negative  Allergic/Immunologic: Negative  Neurological: Negative  Hematological: Negative  Psychiatric/Behavioral: Negative  Physical Exam  Physical Exam  Vitals and nursing note reviewed  Constitutional:       Appearance: She is well-developed and normal weight  HENT:      Head: Normocephalic and atraumatic  Mouth/Throat:      Mouth: Mucous membranes are moist       Pharynx: Oropharynx is clear  Eyes:      Extraocular Movements: Extraocular movements intact  Cardiovascular:      Rate and Rhythm: Normal rate and regular rhythm  Heart sounds: Normal heart sounds  Abdominal:      General: Abdomen is flat  Bowel sounds are normal       Palpations: Abdomen is soft  Tenderness: There is abdominal tenderness in the right lower quadrant, epigastric area, periumbilical area, suprapubic area and left lower quadrant  Skin:     General: Skin is warm  Capillary Refill: Capillary refill takes less than 2 seconds  Neurological:      General: No focal deficit present  Mental Status: She is alert and oriented to person, place, and time     Psychiatric:         Mood and Affect: Mood normal          Behavior: Behavior normal          Vital Signs  ED Triage Vitals [07/05/22 1247]   Temperature Pulse Respirations Blood Pressure SpO2   98 9 °F (37 2 °C) 96 16 138/94 98 %      Temp Source Heart Rate Source Patient Position - Orthostatic VS BP Location FiO2 (%)   Tympanic Monitor Sitting Right arm --      Pain Score       5           Vitals:    07/05/22 1247 07/05/22 1558   BP: 138/94 140/91   Pulse: 96 90   Patient Position - Orthostatic VS: Sitting Sitting         Visual Acuity      ED Medications  Medications - No data to display    Diagnostic Studies  Results Reviewed     Procedure Component Value Units Date/Time    UA w Reflex to Microscopic w Reflex to Culture [[id number]]  (Abnormal) Collected: 07/05/22 1507    Lab Status: Final result Specimen: Urine, Clean Catch Updated: 07/05/22 1512     Color, UA Meghann     Clarity, UA Slightly Cloudy     Specific Gravity, UA >=1 030     pH, UA 6 0     Leukocytes, UA Negative     Nitrite, UA Negative     Protein, UA Negative mg/dl      Glucose, UA Negative mg/dl      Ketones, UA 15 (1+) mg/dl      Urobilinogen, UA 1 0 E U /dl      Bilirubin, UA 1+     Occult Blood, UA Negative    Comprehensive metabolic panel [[id number]] Collected: 07/05/22 1255    Lab Status: Final result Specimen: Blood from Arm, Left Updated: 07/05/22 1318     Sodium 136 mmol/L      Potassium 3 8 mmol/L      Chloride 102 mmol/L      CO2 26 mmol/L      ANION GAP 8 mmol/L      BUN 10 mg/dL      Creatinine 0 77 mg/dL      Glucose 94 mg/dL      Calcium 9 1 mg/dL      AST 19 U/L      ALT 24 U/L      Alkaline Phosphatase 49 U/L      Total Protein 7 3 g/dL      Albumin 4 2 g/dL      Total Bilirubin 0 54 mg/dL      eGFR 94 ml/min/1 73sq m     Narrative:      [place] guidelines for Chronic Kidney Disease (CKD):     Stage 1 with normal or high GFR (GFR > 90 mL/min/1 73 square meters)    Stage 2 Mild CKD (GFR = 60-89 mL/min/1 73 square meters)    Stage 3A Moderate CKD (GFR = 45-59 mL/min/1 73 square meters)    Stage 3B Moderate CKD (GFR = 30-44 mL/min/1 73 square meters)    Stage 4 Severe CKD (GFR = 15-29 mL/min/1 73 square meters)    Stage 5 End Stage CKD (GFR <15 mL/min/1 73 square meters)  Note: GFR calculation is accurate only with a steady state creatinine    Lipase [[id number]]  (Normal) Collected: 07/05/22 1255    Lab Status: Final result Specimen: Blood from Arm, Left Updated: 07/05/22 1318     Lipase 14 u/L     CBC and differential [[id number]] Collected: 07/05/22 1255    Lab Status: Final result Specimen: Blood from Arm, Left Updated: 07/05/22 1300     WBC 8 16 Thousand/uL      RBC 5 01 Million/uL      Hemoglobin 14 3 g/dL      Hematocrit 44 0 %      MCV 88 fL      MCH 28 5 pg      MCHC 32 5 g/dL      RDW 12 8 %      MPV 9 0 fL      Platelets 935 Thousands/uL      nRBC 0 /100 WBCs      Neutrophils Relative 64 %      Immat GRANS % 0 %      Lymphocytes Relative 26 %      Monocytes Relative 9 %      Eosinophils Relative 1 %      Basophils Relative 0 %      Neutrophils Absolute 5 22 Thousands/µL      Immature Grans Absolute 0 03 Thousand/uL      Lymphocytes Absolute 2 14 Thousands/µL      Monocytes Absolute 0 69 Thousand/µL      Eosinophils Absolute 0 05 Thousand/µL      Basophils Absolute 0 03 Thousands/µL                  CT abdomen pelvis wo contrast   Final Result by Catherine Larkin MD (07/05 1446)      Short segment active inflammatory disease involving a mid small bowel loop, likely secondary to patient's Crohn's disease  Workstation performed: [id number]                    Procedures  Procedures         ED Course  ED Course as of 07/05/22 1617   Tue Jul 05, 2022   1451 CT showed:    Short segment active inflammatory disease involving a mid small bowel loop, likely secondary to patient's Crohn's disease  No bowel obstruction  [id number] Patient reassessed, mild dehydration on UA with the ketones, tolerating p o  challenge baseline labs unremarkable, we see a unremarkable, reviewed CT results with the patient noted the patient had a mid small bowel loops with active inflammatory changes, no bowel obstruction  Patient is currently on biologics for her Crohn's disease, has an appointment early next, encouraged patient to follow up and give a call to her gastroenterology specialist tomorrow  SBIRT 22yo+    Flowsheet Row Most Recent Value   SBIRT (25 yo +)    In order to provide better care to our patients, we are screening all of our patients for alcohol and drug use  Would it be okay to ask you these screening questions? Yes Filed at: 07/05/2022 1419   Initial Alcohol Screen: US AUDIT-C     1  How often do you have a drink containing alcohol? 2 Filed at: 07/05/2022 1419   2  How many drinks containing alcohol do you have on a typical day you are drinking? 0 Filed at: 07/05/2022 1419   3a  Male UNDER 65: How often do you have five or more drinks on one occasion? 0 Filed at: 07/05/2022 1419   3b  FEMALE Any Age, or MALE 65+: How often do you have 4 or more drinks on one occassion? 0 Filed at: 07/05/2022 1419   Audit-C Score 2 Filed at: 07/05/2022 1419   LISA: How many times in the past year have you    Used an illegal drug or used a prescription medication for non-medical reasons? Never Filed at: 07/05/2022 1419                    MDM  Number of Diagnoses or Management Options  Abdominal pain  Crohn's disease ([facility] [address] )  Nausea and vomiting  Diagnosis management comments: Established history Crohn's disease, presents emergency department as a 42-year-old female complaining of generalized intermittent abdominal pain, benign soft abdominal physical exam, labs unremarkable, patient is not clinically dehydrated, tolerated p o  home, CT of the abdomen and pelvis showed no acute bowel obstruction, there was small degree of inflammation long portion of the small bowel consistent with possible mild Crohn's flare, bloody diarrhea, mucus in the patient's stool, patient is currently on biologics for Crohn's disease, is followed by Dr Dameon Mantilla as an outpatient, follow-up with GI in the next few days  Patient stable for discharge  Portions of the record may have been created with voice recognition software  Occasional wrong word or "sound a like" substitutions may have occurred due to the inherent limitations of voice recognition software  Read the chart carefully and recognize, using context, where substitutions have occurred      Counseling: I had a detailed discussion with the patient and/or guardian regarding: the historical points, exam findings, and any diagnostic results supporting the discharge diagnosis, lab results, radiology results, discharge instructions reviewed with patient and/or family/caregiver and understanding was verbalized  Instructions given to return to the emergency department if symptoms worsen or persist, or if there are any questions or concerns that arise at home         Amount and/or Complexity of Data Reviewed  Clinical lab tests: ordered and reviewed  Tests in the radiology section of CPT®: ordered and reviewed  Tests in the medicine section of CPT®: ordered        Disposition  Final diagnoses:   Nausea and vomiting   Abdominal pain   Crohn's disease ([address] )     Time reflects when diagnosis was documented in both MDM as applicable and the Disposition within this note     Time User Action Codes Description Comment    7/5/2022  3:54 PM Lendell [place] Add [R11 2] Nausea and vomiting     7/5/2022  3:54 PM Lendell [place] Add [R10 9] Abdominal pain     7/5/2022  3:54 PM Lendell [place] Band Add [K50 90] Crohn's disease [facility])       ED Disposition     ED Disposition   Discharge    Condition   Stable    Date/Time   Tue Jul 5, 2022  3:54 PM    Comment   Marilin Morgan discharge to home/self care  Follow-up Information     Follow up With Specialties Details Why Contact Info    Ryder England,  Family Medicine   [address] [place] [id number]  [phone]      Ellis Pelayo MD Gastroenterology   [place] [address]  [phone]            Discharge Medication List as of 7/5/2022  3:54 PM      CONTINUE these medications which have NOT CHANGED    Details   amLODIPine (NORVASC) 5 mg tablet Take 1 tablet (5 mg total) by mouth daily, Starting Wed 1/12/2022, Normal      Cholecalciferol (VITAMIN D3 PO) Take 4,000 Units by mouth, Historical Med      clobetasol (TEMOVATE) 0 05 % external solution Apply topically in the morning, Starting Thu 3/24/2022, Normal      fluticasone (FLONASE) 50 mcg/act nasal spray 2 sprays into each nostril daily, Starting Mon 1/31/2022, Normal      lisinopril (ZESTRIL) 20 mg tablet Take 1 tablet (20 mg total) by mouth daily, Starting Wed 1/12/2022, Normal      mesalamine (LIALDA) 1 2 g EC tablet Take 1,200 mg by mouth 4 (four) times a day, Historical Med      Mibelas 24 Fe 1-20 MG-MCG(24) CHEW Starting Wed 1/12/2022, Historical Med      Multiple Vitamin (multivitamin) tablet Take 1 tablet by mouth daily, Historical Med      norgestrel-ethinyl estradiol (LO/OVRAL) 0 3 mg-30 mcg per tablet Take 1 tablet by mouth daily, Historical Med      Ortikos 9 MG CP24 Starting Mon 3/21/2022, Historical Med             No discharge procedures on file      PDMP Review     None          ED Provider  Electronically Signed by           Antonio Severin III, DO  07/05/22 3058

## 2022-07-06 ENCOUNTER — APPOINTMENT (OUTPATIENT)
Dept: LAB | Facility: CLINIC | Age: 42
End: 2022-07-06
Payer: COMMERCIAL

## 2022-07-06 DIAGNOSIS — R79.82 ELEVATED C-REACTIVE PROTEIN (CRP): ICD-10-CM

## 2022-07-06 DIAGNOSIS — I10 PRIMARY HYPERTENSION: ICD-10-CM

## 2022-07-06 DIAGNOSIS — K50.018 CROHN'S DISEASE OF SMALL INTESTINE WITH OTHER COMPLICATION (HCC): ICD-10-CM

## 2022-07-06 LAB — CRP SERPL QL: 8.5 MG/L

## 2022-07-06 PROCEDURE — 86140 C-REACTIVE PROTEIN: CPT

## 2022-07-06 PROCEDURE — 82397 CHEMILUMINESCENT ASSAY: CPT

## 2022-07-06 PROCEDURE — 36415 COLL VENOUS BLD VENIPUNCTURE: CPT

## 2022-07-06 PROCEDURE — 80280 DRUG ASSAY VEDOLIZUMAB: CPT

## 2022-07-06 RX ORDER — LISINOPRIL 20 MG/1
TABLET ORAL
Qty: 90 TABLET | Refills: 0 | Status: SHIPPED | OUTPATIENT
Start: 2022-07-06 | End: 2022-10-03

## 2022-07-06 RX ORDER — AMLODIPINE BESYLATE 5 MG/1
TABLET ORAL
Qty: 90 TABLET | Refills: 0 | Status: SHIPPED | OUTPATIENT
Start: 2022-07-06 | End: 2022-10-03

## 2022-07-12 ENCOUNTER — APPOINTMENT (OUTPATIENT)
Dept: LAB | Facility: CLINIC | Age: 42
End: 2022-07-12
Payer: COMMERCIAL

## 2022-07-18 ENCOUNTER — OFFICE VISIT (OUTPATIENT)
Dept: FAMILY MEDICINE CLINIC | Facility: CLINIC | Age: 42
End: 2022-07-18
Payer: COMMERCIAL

## 2022-07-18 VITALS
DIASTOLIC BLOOD PRESSURE: 84 MMHG | WEIGHT: 205.25 LBS | TEMPERATURE: 98.6 F | BODY MASS INDEX: 40.3 KG/M2 | SYSTOLIC BLOOD PRESSURE: 124 MMHG | HEART RATE: 104 BPM | OXYGEN SATURATION: 98 % | HEIGHT: 60 IN

## 2022-07-18 DIAGNOSIS — E78.00 HYPERCHOLESTEROLEMIA: ICD-10-CM

## 2022-07-18 DIAGNOSIS — Z12.31 ENCOUNTER FOR SCREENING MAMMOGRAM FOR BREAST CANCER: ICD-10-CM

## 2022-07-18 DIAGNOSIS — I10 PRIMARY HYPERTENSION: ICD-10-CM

## 2022-07-18 DIAGNOSIS — Z00.00 ANNUAL PHYSICAL EXAM: Primary | ICD-10-CM

## 2022-07-18 PROCEDURE — 99213 OFFICE O/P EST LOW 20 MIN: CPT | Performed by: FAMILY MEDICINE

## 2022-07-18 PROCEDURE — 99396 PREV VISIT EST AGE 40-64: CPT | Performed by: FAMILY MEDICINE

## 2022-07-18 RX ORDER — OMEPRAZOLE 20 MG/1
CAPSULE, DELAYED RELEASE ORAL
COMMUNITY
Start: 2022-07-14

## 2022-07-18 NOTE — PROGRESS NOTES
Assessment/Plan:    No problem-specific Assessment & Plan notes found for this encounter  Diagnoses and all orders for this visit:    Annual physical exam    Primary hypertension    Hypercholesterolemia  Comments:  pt counseled on diet and exercise    Encounter for screening mammogram for breast cancer    Other orders  -     omeprazole (PriLOSEC) 20 mg delayed release capsule          PHQ-2/9 Depression Screening              Subjective:      Patient ID: Rita Cooney is a 35 y o  female  Pt here for annual physical    Hypertension  This is a chronic problem  The current episode started more than 1 year ago  The problem is unchanged  The problem is controlled  Pertinent negatives include no chest pain, palpitations or shortness of breath  Past treatments include ACE inhibitors and calcium channel blockers  Compliance problems include diet and exercise  The following portions of the patient's history were reviewed and updated as appropriate: allergies, current medications, past family history, past medical history, past social history, past surgical history and problem list     Review of Systems   Constitutional: Positive for fatigue  Negative for chills and fever  HENT: Negative  Negative for hearing loss  Eyes: Negative  Negative for visual disturbance  Respiratory: Negative for shortness of breath and wheezing  Cardiovascular: Negative for chest pain and palpitations  Gastrointestinal: Negative for abdominal pain, blood in stool, constipation, diarrhea, nausea and vomiting  Endocrine: Negative  Genitourinary: Negative for difficulty urinating and dysuria  Musculoskeletal: Positive for arthralgias  Negative for myalgias  Skin: Negative  Allergic/Immunologic: Negative  Neurological: Negative for seizures and syncope  Hematological: Negative for adenopathy  Psychiatric/Behavioral: Negative            Objective:    /84 (BP Location: Left arm, Patient Position: Sitting, Cuff Size: Large)   Pulse 104   Temp 98 6 °F (37 °C) (Tympanic)   Ht 5' (1 524 m)   Wt 93 1 kg (205 lb 4 oz)   SpO2 98%   BMI 40 09 kg/m²      Physical Exam  Vitals and nursing note reviewed  Constitutional:       General: She is not in acute distress  Appearance: Normal appearance  She is well-developed  She is not ill-appearing, toxic-appearing or diaphoretic  HENT:      Head: Normocephalic and atraumatic  Right Ear: Tympanic membrane, ear canal and external ear normal  There is no impacted cerumen  Left Ear: Tympanic membrane, ear canal and external ear normal  There is no impacted cerumen  Nose: Nose normal  No congestion or rhinorrhea  Mouth/Throat:      Mouth: Mucous membranes are moist       Pharynx: Oropharynx is clear  No oropharyngeal exudate or posterior oropharyngeal erythema  Eyes:      General: No scleral icterus  Right eye: No discharge  Left eye: No discharge  Conjunctiva/sclera: Conjunctivae normal       Pupils: Pupils are equal, round, and reactive to light  Cardiovascular:      Rate and Rhythm: Normal rate and regular rhythm  Pulses: Normal pulses  Heart sounds: Normal heart sounds  No murmur heard  Pulmonary:      Effort: Pulmonary effort is normal  No respiratory distress  Breath sounds: Normal breath sounds  No wheezing, rhonchi or rales  Abdominal:      General: Bowel sounds are normal  There is no distension  Palpations: Abdomen is soft  There is no mass  Tenderness: There is no abdominal tenderness  There is no guarding or rebound  Musculoskeletal:         General: Normal range of motion  Cervical back: Normal range of motion and neck supple  No rigidity  Right lower leg: No edema  Left lower leg: No edema  Lymphadenopathy:      Cervical: No cervical adenopathy  Skin:     General: Skin is warm and dry  Findings: No rash     Neurological:      General: No focal deficit present  Mental Status: She is alert and oriented to person, place, and time  Sensory: No sensory deficit  Motor: No weakness or abnormal muscle tone  Coordination: Coordination normal       Gait: Gait normal       Deep Tendon Reflexes: Reflexes are normal and symmetric  Psychiatric:         Mood and Affect: Mood normal          Behavior: Behavior normal          Thought Content:  Thought content normal          Judgment: Judgment normal

## 2022-07-19 ENCOUNTER — TELEPHONE (OUTPATIENT)
Dept: ADMINISTRATIVE | Facility: OTHER | Age: 42
End: 2022-07-19

## 2022-07-19 NOTE — TELEPHONE ENCOUNTER
----- Message from Girish Pap sent at 7/18/2022  1:56 PM EDT -----  Regarding: papsmear  07/18/22 1:56 PM    Hello, our patient Damon Lee has had Pap Smear (HPV) aka Cervical Cancer Screening completed/performed  Please assist in updating the patient chart by making an External outreach to [employer] facility located in [address] [place] [address], [place], [address] [facility]  The date of service is May/June 2022      Thank you,  Girish [facility]

## 2022-07-19 NOTE — LETTER
Procedure Request Form: Cervical Cancer Screening      Date Requested: 22  Patient: Reina Goodman  Patient : 1980   Referring Provider: Ubaldo Magaña, DO        Date of Procedure ______________________________       The above patient has informed us that they have completed their   most recent Cervical Cancer Screening at your facility  Please complete   this form and attach all corresponding procedure reports/results  Comments __________________________________________________________  ____________________________________________________________________  ____________________________________________________________________  ____________________________________________________________________    Facility Completing Procedure _________________________________________    Form Completed By (print name) _______________________________________      Signature __________________________________________________________      These reports are needed for  compliance  Please fax this completed form and a copy of the procedure report to our office located at [place] [address] as soon as possible to [phone] nicolette Gandhi: Phone [phone]    We thank you for your assistance in treating our mutual patient

## 2022-07-20 NOTE — TELEPHONE ENCOUNTER
Upon review of the In Basket request and the patient's chart, initial outreach has been made via fax, please see Contacts section for details       Thank you  Jessica Gonzales

## 2022-07-21 NOTE — TELEPHONE ENCOUNTER
Upon review of the In Basket request we were able to locate, review, and update the patient chart as requested for Pap Smear (HPV) aka Cervical Cancer Screening  Any additional questions or concerns should be emailed to the Practice Liaisons via [email or web address] email, please do not reply via In Basket      Thank you  Whit Farmer

## 2022-07-31 ENCOUNTER — OFFICE VISIT (OUTPATIENT)
Dept: URGENT CARE | Facility: CLINIC | Age: 42
End: 2022-07-31
Payer: COMMERCIAL

## 2022-07-31 VITALS
DIASTOLIC BLOOD PRESSURE: 88 MMHG | HEART RATE: 98 BPM | OXYGEN SATURATION: 98 % | RESPIRATION RATE: 18 BRPM | SYSTOLIC BLOOD PRESSURE: 143 MMHG | TEMPERATURE: 97.6 F

## 2022-07-31 DIAGNOSIS — L03.314 CELLULITIS OF GROIN: Primary | ICD-10-CM

## 2022-07-31 PROCEDURE — 99213 OFFICE O/P EST LOW 20 MIN: CPT | Performed by: NURSE PRACTITIONER

## 2022-07-31 RX ORDER — CEPHALEXIN 500 MG/1
500 CAPSULE ORAL 4 TIMES DAILY
Qty: 40 CAPSULE | Refills: 0 | Status: SHIPPED | OUTPATIENT
Start: 2022-07-31 | End: 2022-08-10

## 2022-07-31 NOTE — PROGRESS NOTES
[facility]        NAME: Mona Woodard is a 35 y o  female  : 1980    MRN: [id number]  DATE: 2022  TIME: 12:54 PM      Assessment and Plan     Cellulitis of groin [T97 040]  1  Cellulitis of groin  cephalexin (KEFLEX) 500 mg capsule         Patient Instructions     Patient Instructions     Take the keflex as ordered until completed  Eat yogurt or take a probiotic to restore good bacteria to your gut; this helps prevent stomach irritation/diarrhea while on an antibiotic  After 24 hours, symptoms should not get worse  It may take a few days to start to see improvement  If symptoms are worsening after 24 hours or if you are not seeing improvement, you should be seen again  Cellulitis   AMBULATORY CARE:   Cellulitis  is a skin infection caused by bacteria  Cellulitis is common and can become severe  Cellulitis usually appears on the lower legs  It can also appear on the arms, face, and other areas  Cellulitis develops when bacteria enter a crack or break in your skin, such as a scratch, bite, or cut  Common signs and symptoms:  Signs and symptoms usually appear on one side of your body  You may have any of the following:  · A fever    · A red, warm, swollen area on your skin    · Pain when the area is touched    · Red spots, bumps, or blisters that may drain pus    · Bumpy, raised skin that feels like an orange peel    Seek care immediately if:   · Your wound gets larger and more painful  · You feel a crackling under your skin when you touch it  · You have purple dots or bumps on your skin, or you see bleeding under your skin  · You see red streaks coming from the infected area  Call your doctor if:   · The red, warm, swollen area gets larger  · Your fever or pain does not go away or gets worse  · The area does not get smaller after 3 days of antibiotics  · You have questions or concerns about your condition or care      Treatment:  You should start to see improvement in 3 days  If your cellulitis is severe, you may need IV antibiotics in the hospital  If cellulitis is not treated, the infection can spread through your body and become life-threatening  You may need any of the following medicines:  · Antibiotics  help treat the bacterial infection  · Acetaminophen  decreases pain and fever  It is available without a doctor's order  Ask how much to take and how often to take it  Follow directions  Read the labels of all other medicines you are using to see if they also contain acetaminophen, or ask your doctor or pharmacist  Acetaminophen can cause liver damage if not taken correctly  Do not use more than 4 grams (4,000 milligrams) total of acetaminophen in one day  · NSAIDs , such as ibuprofen, help decrease swelling, pain, and fever  This medicine is available with or without a doctor's order  NSAIDs can cause stomach bleeding or kidney problems in certain people  If you take blood thinner medicine, always ask your healthcare provider if NSAIDs are safe for you  Always read the medicine label and follow directions  · Take your medicine as directed  Contact your healthcare provider if you think your medicine is not helping or if you have side effects  Tell him or her if you are allergic to any medicine  Keep a list of the medicines, vitamins, and herbs you take  Include the amounts, and when and why you take them  Bring the list or the pill bottles to follow-up visits  Carry your medicine list with you in case of an emergency  Self-care:   · Wash the area with soap and water every day  Gently pat dry  Use bandages if directed by your healthcare provider  · Elevate the area above the level of your heart  as often as you can  This will help decrease swelling and pain  Prop the area on pillows or blankets to keep it elevated comfortably  · Place a cool, damp cloth on the area  Use clean cloths and clean water   You can do this as often as you need to  Cool, damp cloths may help decrease pain  · Apply cream or ointment as directed  These help protect the area  Most over-the-counter products, such as petroleum jelly, are good to use  Ask your healthcare provider about specific creams or ointments you should use  Prevent cellulitis:   · Do not scratch bug bites or areas of injury  You increase your risk for cellulitis by scratching these areas  · Do not share personal items, such as towels, clothing, and razors  · Clean exercise equipment  with germ-killing  before and after you use it  · Treat athlete's foot  This can help prevent the spread of a bacterial skin infection  · Wash your hands often  Use soap and water  Wash your hands after you use the bathroom, change a child's diapers, or sneeze  Wash your hands before you prepare or eat food  Use lotion to prevent dry, cracked skin  Follow up with your doctor within 3 days, or as directed:  He or she will check if your cellulitis is getting better  Write down your questions so you remember to ask them during your visits  © Copyright [employer] 2022 Information is for End User's use only and may not be sold, redistributed or otherwise used for commercial purposes  All illustrations and images included in CareNotes® are the copyrighted property of A D A M , Inc  or [address] [place], [place] [address]evelyn babak   The above information is an  only  It is not intended as medical advice for individual conditions or treatments  Talk to your doctor, nurse or pharmacist before following any medical regimen to see if it is safe and effective for you  Follow up with PCP in 3-5 days  Proceed to  ER if symptoms worsen  Chief Complaint     Chief Complaint   Patient presents with    Insect Bite     Insect bite on her upper inner thigh            History of Present Illness     Onset of right groin/upper thigh/lower abdomen redness Fri evening, quickly getting worse not better--more red, larger area, warm to touch, painful not itchy  Unknown cause--perhaps a bug bite but patient uncertain  No improvement with topical antibiotic cream       Review of Systems     Review of Systems   Constitutional: Positive for chills and fever (Tmax 99 9 today)  Skin: Positive for color change  Negative for wound  All other systems reviewed and are negative  Current Medications       Current Outpatient Medications:     cephalexin (KEFLEX) 500 mg capsule, Take 1 capsule (500 mg total) by mouth 4 (four) times a day for 10 days, Disp: 40 capsule, Rfl: 0    amLODIPine (NORVASC) 5 mg tablet, TAKE 1 TABLET DAILY  , Disp: 90 tablet, Rfl: 0    Cholecalciferol (VITAMIN D3 PO), Take 4,000 Units by mouth, Disp: , Rfl:     clobetasol (TEMOVATE) 0 05 % external solution, Apply topically in the morning (Patient not taking: Reported on 7/18/2022), Disp: 50 mL, Rfl: 0    fluticasone (FLONASE) 50 mcg/act nasal spray, 2 sprays into each nostril daily, Disp: 18 2 mL, Rfl: 6    lisinopril (ZESTRIL) 20 mg tablet, TAKE ONE TABLET DAILY   , Disp: 90 tablet, Rfl: 0    mesalamine (LIALDA) 1 2 g EC tablet, Take 1,200 mg by mouth 4 (four) times a day, Disp: , Rfl:     Mibelas 24 Fe 1-20 MG-MCG(24) CHEW, , Disp: , Rfl:     Multiple Vitamin (multivitamin) tablet, Take 1 tablet by mouth daily, Disp: , Rfl:     norgestrel-ethinyl estradiol (LO/OVRAL) 0 3 mg-30 mcg per tablet, Take 1 tablet by mouth daily (Patient not taking: No sig reported), Disp: , Rfl:     omeprazole (PriLOSEC) 20 mg delayed release capsule, , Disp: , Rfl:     Ortikos 9 MG CP24, , Disp: , Rfl:     Current Allergies     Allergies as of 07/31/2022    (No Known Allergies)              The following portions of the patient's history were reviewed and updated as appropriate: allergies, current medications, past family history, past medical history, past social history, past surgical history and problem list      Past Medical History:   Diagnosis Date  COVID-19 vaccine administered     [employer] in March 2021    Crohn disease ([address] )     Hypertension     IBS (irritable bowel syndrome)     Inflammatory bowel disease 2003    Crohns dx 11/2020    Kidney stone 06/2021    Lithotripsy done 7/2021    Obstruction of right ureteropelvic junction due to stone     PONV (postoperative nausea and vomiting)     Pt requests to be pre-medicated for N/V prior to sx    Psoriatic arthritis ([address] )     Wears glasses        Past Surgical History:   Procedure Laterality Date    CHOLECYSTECTOMY      ID CYSTOURETHROSCOPY,URETER CATHETER Right 6/18/2021    Procedure: CYSTOSCOPY RETROGRADE PYELOGRAM WITH INSERTION STENT URETERAL;  Surgeon: Farhat Medeiros MD;  Location: [address] [place] [address] MAIN OR;  Service: Urology    [facility] Right 7/16/2021    Procedure: LITHOTRIPSY EXTRACORPORAL SHOCKWAVE (ESWL); Surgeon: Farhat Medeiros MD;  Location:  MAIN OR;  Service: Urology    TONSILLECTOMY         Family History   Problem Relation Age of Onset    Diabetes Mother     Hypertension Father     Heart attack Father     No Known Problems Maternal Grandmother     No Known Problems Maternal Grandfather     No Known Problems Paternal Grandmother     No Known Problems Paternal Grandfather     No Known Problems Maternal Aunt     No Known Problems Maternal Aunt     Breast cancer Paternal Aunt 39         Medications have been verified  Objective     /88   Pulse 98   Temp 97 6 °F (36 4 °C)   Resp 18   SpO2 98%   No LMP recorded  (Menstrual status: Birth Control)  Physical Exam     Physical Exam  Vitals and nursing note reviewed  Constitutional:       General: She is not in acute distress  Appearance: Normal appearance  She is well-developed  She is not ill-appearing, toxic-appearing or diaphoretic  HENT:      Head: Normocephalic and atraumatic  Pulmonary:      Effort: Pulmonary effort is normal  No respiratory distress     Abdominal:      General: There is no distension  Palpations: Abdomen is soft  Musculoskeletal:         General: Normal range of motion  Skin:     General: Skin is warm and dry  Capillary Refill: Capillary refill takes less than 2 seconds  Findings: Erythema (mildly swollen, reddened, warm, tender  Does not appear fungal--appears to be cellulitis  No open areas) present  Neurological:      General: No focal deficit present  Mental Status: She is alert and oriented to person, place, and time  Psychiatric:         Mood and Affect: Mood normal          Behavior: Behavior normal          Thought Content:  Thought content normal          Judgment: Judgment normal

## 2022-07-31 NOTE — PATIENT INSTRUCTIONS
Take the keflex as ordered until completed  Eat yogurt or take a probiotic to restore good bacteria to your gut; this helps prevent stomach irritation/diarrhea while on an antibiotic  After 24 hours, symptoms should not get worse  It may take a few days to start to see improvement  If symptoms are worsening after 24 hours or if you are not seeing improvement, you should be seen again  Cellulitis   AMBULATORY CARE:   Cellulitis  is a skin infection caused by bacteria  Cellulitis is common and can become severe  Cellulitis usually appears on the lower legs  It can also appear on the arms, face, and other areas  Cellulitis develops when bacteria enter a crack or break in your skin, such as a scratch, bite, or cut  Common signs and symptoms:  Signs and symptoms usually appear on one side of your body  You may have any of the following:  A fever    A red, warm, swollen area on your skin    Pain when the area is touched    Red spots, bumps, or blisters that may drain pus    Bumpy, raised skin that feels like an orange peel    Seek care immediately if:   Your wound gets larger and more painful  You feel a crackling under your skin when you touch it  You have purple dots or bumps on your skin, or you see bleeding under your skin  You see red streaks coming from the infected area  Call your doctor if:   The red, warm, swollen area gets larger  Your fever or pain does not go away or gets worse  The area does not get smaller after 3 days of antibiotics  You have questions or concerns about your condition or care  Treatment:  You should start to see improvement in 3 days  If your cellulitis is severe, you may need IV antibiotics in the hospital  If cellulitis is not treated, the infection can spread through your body and become life-threatening  You may need any of the following medicines:  Antibiotics  help treat the bacterial infection  Acetaminophen  decreases pain and fever   It is available without a doctor's order  Ask how much to take and how often to take it  Follow directions  Read the labels of all other medicines you are using to see if they also contain acetaminophen, or ask your doctor or pharmacist  Acetaminophen can cause liver damage if not taken correctly  Do not use more than 4 grams (4,000 milligrams) total of acetaminophen in one day  NSAIDs , such as ibuprofen, help decrease swelling, pain, and fever  This medicine is available with or without a doctor's order  NSAIDs can cause stomach bleeding or kidney problems in certain people  If you take blood thinner medicine, always ask your healthcare provider if NSAIDs are safe for you  Always read the medicine label and follow directions  Take your medicine as directed  Contact your healthcare provider if you think your medicine is not helping or if you have side effects  Tell him or her if you are allergic to any medicine  Keep a list of the medicines, vitamins, and herbs you take  Include the amounts, and when and why you take them  Bring the list or the pill bottles to follow-up visits  Carry your medicine list with you in case of an emergency  Self-care:   Wash the area with soap and water every day  Gently pat dry  Use bandages if directed by your healthcare provider  Elevate the area above the level of your heart  as often as you can  This will help decrease swelling and pain  Prop the area on pillows or blankets to keep it elevated comfortably  Place a cool, damp cloth on the area  Use clean cloths and clean water  You can do this as often as you need to  Cool, damp cloths may help decrease pain  Apply cream or ointment as directed  These help protect the area  Most over-the-counter products, such as petroleum jelly, are good to use  Ask your healthcare provider about specific creams or ointments you should use  Prevent cellulitis:   Do not scratch bug bites or areas of injury    You increase your risk for cellulitis by scratching these areas  Do not share personal items, such as towels, clothing, and razors  Clean exercise equipment  with germ-killing  before and after you use it  Treat athlete's foot  This can help prevent the spread of a bacterial skin infection  Wash your hands often  Use soap and water  Wash your hands after you use the bathroom, change a child's diapers, or sneeze  Wash your hands before you prepare or eat food  Use lotion to prevent dry, cracked skin  Follow up with your doctor within 3 days, or as directed:  He or she will check if your cellulitis is getting better  Write down your questions so you remember to ask them during your visits  © Copyright [employer] 2022 Information is for End User's use only and may not be sold, redistributed or otherwise used for commercial purposes  All illustrations and images included in CareNotes® are the copyrighted property of [employer] Inc  or Kacie Gonzalez   The above information is an  only  It is not intended as medical advice for individual conditions or treatments  Talk to your doctor, nurse or pharmacist before following any medical regimen to see if it is safe and effective for you

## 2022-08-02 DIAGNOSIS — L03.314 CELLULITIS OF GROIN: Primary | ICD-10-CM

## 2022-08-02 RX ORDER — FLUCONAZOLE 150 MG/1
150 TABLET ORAL DAILY
Qty: 5 TABLET | Refills: 0 | Status: SHIPPED | OUTPATIENT
Start: 2022-08-02 | End: 2022-08-07

## 2022-08-12 ENCOUNTER — OFFICE VISIT (OUTPATIENT)
Dept: FAMILY MEDICINE CLINIC | Facility: CLINIC | Age: 42
End: 2022-08-12
Payer: COMMERCIAL

## 2022-08-12 VITALS
HEIGHT: 60 IN | OXYGEN SATURATION: 97 % | SYSTOLIC BLOOD PRESSURE: 136 MMHG | BODY MASS INDEX: 40.25 KG/M2 | WEIGHT: 205 LBS | HEART RATE: 96 BPM | TEMPERATURE: 99.2 F | DIASTOLIC BLOOD PRESSURE: 72 MMHG

## 2022-08-12 DIAGNOSIS — B37.9 YEAST INFECTION: Primary | ICD-10-CM

## 2022-08-12 DIAGNOSIS — N94.9 VAGINAL BURNING: ICD-10-CM

## 2022-08-12 LAB
SL AMB  POCT GLUCOSE, UA: NEGATIVE
SL AMB LEUKOCYTE ESTERASE,UA: NEGATIVE
SL AMB POCT BILIRUBIN,UA: NORMAL
SL AMB POCT BLOOD,UA: NEGATIVE
SL AMB POCT CLARITY,UA: CLEAR
SL AMB POCT COLOR,UA: YELLOW
SL AMB POCT KETONES,UA: NEGATIVE
SL AMB POCT NITRITE,UA: NEGATIVE
SL AMB POCT PH,UA: 5.5
SL AMB POCT SPECIFIC GRAVITY,UA: >1.03
SL AMB POCT URINE PROTEIN: NEGATIVE
SL AMB POCT UROBILINOGEN: 0.2

## 2022-08-12 PROCEDURE — 87480 CANDIDA DNA DIR PROBE: CPT | Performed by: NURSE PRACTITIONER

## 2022-08-12 PROCEDURE — 99213 OFFICE O/P EST LOW 20 MIN: CPT | Performed by: NURSE PRACTITIONER

## 2022-08-12 PROCEDURE — 3078F DIAST BP <80 MM HG: CPT | Performed by: NURSE PRACTITIONER

## 2022-08-12 PROCEDURE — 3075F SYST BP GE 130 - 139MM HG: CPT | Performed by: NURSE PRACTITIONER

## 2022-08-12 PROCEDURE — 81002 URINALYSIS NONAUTO W/O SCOPE: CPT | Performed by: NURSE PRACTITIONER

## 2022-08-12 PROCEDURE — 3725F SCREEN DEPRESSION PERFORMED: CPT | Performed by: NURSE PRACTITIONER

## 2022-08-12 PROCEDURE — 87510 GARDNER VAG DNA DIR PROBE: CPT | Performed by: NURSE PRACTITIONER

## 2022-08-12 PROCEDURE — 87660 TRICHOMONAS VAGIN DIR PROBE: CPT | Performed by: NURSE PRACTITIONER

## 2022-08-12 RX ORDER — FLUCONAZOLE 150 MG/1
150 TABLET ORAL ONCE
Qty: 1 TABLET | Refills: 0 | Status: SHIPPED | OUTPATIENT
Start: 2022-08-12 | End: 2022-08-12

## 2022-08-12 NOTE — PROGRESS NOTES
Assessment/Plan:    No problem-specific Assessment & Plan notes found for this encounter  Diagnoses and all orders for this visit:    Yeast infection  -     fluconazole (DIFLUCAN) 150 mg tablet; Take 1 tablet (150 mg total) by mouth once for 1 dose    Vaginal burning  -     VAGINOSIS DNA PROBE (AFFIRM); Future  -     POCT urine dip  -     VAGINOSIS DNA PROBE (AFFIRM)        Subjective:      Patient ID: Bob Velasquez is a 35 y o  female  Patient presents with vaginal irritation, burning and discharge  This started on Wednesday  She was recently on Keflex for cellulitis  She states that her burning is constant but worse with urination  She does have external itching and burning  Patient denies any significant discharge  Patient states that she is currently sexually active but declines STI testing at this time  Urine dip in the office negative for infection  Will treat for yeast paced on exam and await affirm testing  No other complaints or concerns      The following portions of the patient's history were reviewed and updated as appropriate: allergies, current medications, past family history, past medical history, past social history, past surgical history and problem list     Review of Systems   Constitutional: Negative for activity change, appetite change, chills, fever and unexpected weight change  HENT: Negative for ear pain and sore throat  Eyes: Negative for pain and visual disturbance  Respiratory: Negative for cough and shortness of breath  Cardiovascular: Negative for chest pain and palpitations  Gastrointestinal: Negative for abdominal pain, constipation, diarrhea, nausea and vomiting  Genitourinary: Positive for dyspareunia, vaginal discharge and vaginal pain (irritation)  Negative for decreased urine volume, dysuria, flank pain, genital sores, hematuria, urgency and vaginal bleeding  Musculoskeletal: Negative for arthralgias and back pain     Skin: Negative for color change and rash  Neurological: Negative for seizures and syncope  All other systems reviewed and are negative  Objective:      /72 (BP Location: Right arm, Patient Position: Sitting)   Pulse 96   Temp 99 2 °F (37 3 °C)   Ht 5' (1 524 m)   Wt 93 kg (205 lb)   SpO2 97%   BMI 40 04 kg/m²          Physical Exam  Vitals and nursing note reviewed  Exam conducted with a chaperone present  Constitutional:       Appearance: Normal appearance  Cardiovascular:      Rate and Rhythm: Normal rate and regular rhythm  Pulses: Normal pulses  Heart sounds: Normal heart sounds  No murmur heard  No friction rub  No gallop  Pulmonary:      Effort: Pulmonary effort is normal       Breath sounds: Normal breath sounds  No wheezing, rhonchi or rales  Abdominal:      General: Abdomen is flat  Bowel sounds are normal       Palpations: Abdomen is soft  Tenderness: There is no abdominal tenderness  There is no guarding  Hernia: No hernia is present  Genitourinary:     General: Normal vulva  Pubic Area: No rash or pubic lice  Labia:         Right: No rash, tenderness, lesion or injury  Left: No rash, tenderness, lesion or injury  Urethra: No prolapse, urethral pain, urethral swelling or urethral lesion  Vagina: No signs of injury and foreign body  Vaginal discharge (thick white ) present  No erythema, tenderness, bleeding, lesions or prolapsed vaginal walls  Cervix: Normal    Skin:     General: Skin is warm  Capillary Refill: Capillary refill takes less than 2 seconds  Findings: No bruising, erythema or lesion  Neurological:      General: No focal deficit present  Mental Status: She is alert and oriented to person, place, and time  Mental status is at baseline  Motor: No weakness        Coordination: Coordination normal       Gait: Gait normal    Psychiatric:         Mood and Affect: Mood normal          Behavior: Behavior normal  Thought Content:  Thought content normal          Judgment: Judgment normal

## 2022-08-13 LAB
CANDIDA RRNA VAG QL PROBE: POSITIVE
G VAGINALIS RRNA GENITAL QL PROBE: NEGATIVE
T VAGINALIS RRNA GENITAL QL PROBE: NEGATIVE

## 2022-08-18 ENCOUNTER — HOSPITAL ENCOUNTER (OUTPATIENT)
Dept: MAMMOGRAPHY | Facility: HOSPITAL | Age: 42
Discharge: HOME | End: 2022-08-18
Attending: FAMILY MEDICINE
Payer: COMMERCIAL

## 2022-08-18 DIAGNOSIS — Z12.31 ENCOUNTER FOR SCREENING MAMMOGRAM FOR BREAST CANCER: ICD-10-CM

## 2022-08-18 PROCEDURE — 77067 SCR MAMMO BI INCL CAD: CPT

## 2022-08-18 PROCEDURE — 77063 BREAST TOMOSYNTHESIS BI: CPT

## 2022-09-13 ENCOUNTER — HOSPITAL ENCOUNTER (OUTPATIENT)
Dept: RADIOLOGY | Facility: HOSPITAL | Age: 42
Discharge: HOME | End: 2022-09-13
Attending: UROLOGY
Payer: COMMERCIAL

## 2022-09-13 DIAGNOSIS — N20.0 URIC ACID NEPHROLITHIASIS: ICD-10-CM

## 2022-09-13 PROCEDURE — 74018 RADEX ABDOMEN 1 VIEW: CPT

## 2022-09-26 ENCOUNTER — OFFICE VISIT (OUTPATIENT)
Dept: FAMILY MEDICINE CLINIC | Facility: CLINIC | Age: 42
End: 2022-09-26
Payer: COMMERCIAL

## 2022-09-26 VITALS
DIASTOLIC BLOOD PRESSURE: 82 MMHG | TEMPERATURE: 97.9 F | OXYGEN SATURATION: 99 % | WEIGHT: 204 LBS | BODY MASS INDEX: 40.05 KG/M2 | SYSTOLIC BLOOD PRESSURE: 130 MMHG | HEIGHT: 60 IN | HEART RATE: 107 BPM

## 2022-09-26 DIAGNOSIS — L50.9 HIVES: ICD-10-CM

## 2022-09-26 DIAGNOSIS — L23.9 ALLERGIC CONTACT DERMATITIS, UNSPECIFIED TRIGGER: Primary | ICD-10-CM

## 2022-09-26 PROCEDURE — 3075F SYST BP GE 130 - 139MM HG: CPT | Performed by: FAMILY MEDICINE

## 2022-09-26 PROCEDURE — 99213 OFFICE O/P EST LOW 20 MIN: CPT | Performed by: FAMILY MEDICINE

## 2022-09-26 PROCEDURE — 3725F SCREEN DEPRESSION PERFORMED: CPT | Performed by: FAMILY MEDICINE

## 2022-09-26 PROCEDURE — 3079F DIAST BP 80-89 MM HG: CPT | Performed by: FAMILY MEDICINE

## 2022-09-26 RX ORDER — PREDNISONE 10 MG/1
TABLET ORAL
Qty: 30 TABLET | Refills: 0 | Status: SHIPPED | OUTPATIENT
Start: 2022-09-26 | End: 2022-10-25 | Stop reason: ALTCHOICE

## 2022-09-26 RX ORDER — HYDROXYZINE 50 MG/1
50 TABLET, FILM COATED ORAL 3 TIMES DAILY PRN
Qty: 30 TABLET | Refills: 0 | Status: SHIPPED | OUTPATIENT
Start: 2022-09-26

## 2022-09-26 NOTE — PROGRESS NOTES
Assessment/Plan:    No problem-specific Assessment & Plan notes found for this encounter  Diagnoses and all orders for this visit:    Hives          PHQ-2/9 Depression Screening    Little interest or pleasure in doing things: 0 - not at all  Feeling down, depressed, or hopeless: 0 - not at all  PHQ-2 Score: 0  PHQ-2 Interpretation: Negative depression screen            Subjective:      Patient ID: Rosalva Cerda is a 35 y o  female  Rash  This is a new problem  The current episode started yesterday  The affected locations include the right arm, left arm, back, left ankle and right ankle  The rash is characterized by itchiness  She was exposed to nothing  Pertinent negatives include no fever or shortness of breath  Past treatments include topical steroids (benadryl)  The treatment provided no relief  The following portions of the patient's history were reviewed and updated as appropriate: allergies, current medications, past family history, past medical history, past social history, past surgical history and problem list     Review of Systems   Constitutional: Negative for chills and fever  Respiratory: Negative for shortness of breath and wheezing  Skin: Positive for rash  Objective:    /82 (BP Location: Left arm, Patient Position: Sitting, Cuff Size: Large)   Pulse (!) 107   Temp 97 9 °F (36 6 °C) (Tympanic)   Ht 5' (1 524 m)   Wt 92 5 kg (204 lb)   SpO2 99%   BMI 39 84 kg/m²      Physical Exam  Vitals and nursing note reviewed  Constitutional:       General: She is not in acute distress  Appearance: Normal appearance  She is not ill-appearing, toxic-appearing or diaphoretic  HENT:      Head: Normocephalic and atraumatic  Eyes:      Conjunctiva/sclera: Conjunctivae normal    Cardiovascular:      Rate and Rhythm: Normal rate and regular rhythm  Heart sounds: Normal heart sounds  No murmur heard    Pulmonary:      Effort: Pulmonary effort is normal  No respiratory distress  Breath sounds: Normal breath sounds  No wheezing, rhonchi or rales  Musculoskeletal:      Cervical back: Normal range of motion and neck supple  No rigidity  Right lower leg: No edema  Left lower leg: No edema  Lymphadenopathy:      Cervical: No cervical adenopathy  Neurological:      Mental Status: She is alert and oriented to person, place, and time  Psychiatric:         Mood and Affect: Mood normal          Behavior: Behavior normal          Thought Content:  Thought content normal          Judgment: Judgment normal

## 2022-10-03 DIAGNOSIS — I10 PRIMARY HYPERTENSION: ICD-10-CM

## 2022-10-03 RX ORDER — LISINOPRIL 20 MG/1
TABLET ORAL
Qty: 90 TABLET | Refills: 0 | Status: SHIPPED | OUTPATIENT
Start: 2022-10-03

## 2022-10-03 RX ORDER — AMLODIPINE BESYLATE 5 MG/1
TABLET ORAL
Qty: 90 TABLET | Refills: 0 | Status: SHIPPED | OUTPATIENT
Start: 2022-10-03

## 2022-10-25 ENCOUNTER — OFFICE VISIT (OUTPATIENT)
Dept: URGENT CARE | Facility: CLINIC | Age: 42
End: 2022-10-25
Payer: COMMERCIAL

## 2022-10-25 VITALS
DIASTOLIC BLOOD PRESSURE: 80 MMHG | WEIGHT: 204 LBS | SYSTOLIC BLOOD PRESSURE: 124 MMHG | HEART RATE: 92 BPM | TEMPERATURE: 97.7 F | RESPIRATION RATE: 20 BRPM | OXYGEN SATURATION: 98 % | BODY MASS INDEX: 39.84 KG/M2

## 2022-10-25 DIAGNOSIS — H69.83 EUSTACHIAN TUBE DYSFUNCTION, BILATERAL: Primary | ICD-10-CM

## 2022-10-25 PROCEDURE — 99213 OFFICE O/P EST LOW 20 MIN: CPT | Performed by: PHYSICIAN ASSISTANT

## 2022-10-25 RX ORDER — PREDNISONE 20 MG/1
TABLET ORAL
Qty: 15 TABLET | Refills: 0 | Status: SHIPPED | OUTPATIENT
Start: 2022-10-25

## 2022-10-25 NOTE — PATIENT INSTRUCTIONS
1  Over-the-counter acetaminophen and/or ibuprofen as needed for pain  2  Perform eustachian tube exercises as discussed for your ear pain frequently but gently throughout the course of the day  3  Oxymetazoline nasal spray 2 sprays in each nostril every 12 hours for no more than the next 5 days  4  Return here for any persistent symptoms at 3-5 days or if they worsen at any time

## 2022-10-25 NOTE — PROGRESS NOTES
[facility]        NAME: Jose Luis Perez is a 35 y o  female  : 1980    MRN: [id number]  DATE: 2022  TIME: 3:48 PM    Assessment and Plan   Eustachian tube dysfunction, bilateral [H69 83]  1  Eustachian tube dysfunction, bilateral  predniSONE 20 mg tablet         Patient Instructions     1  Over-the-counter acetaminophen and/or ibuprofen as needed for pain  2  Perform eustachian tube exercises as discussed for your ear pain frequently but gently throughout the course of the day  3  Oxymetazoline nasal spray 2 sprays in each nostril every 12 hours for no more than the next 5 days  4  Return here for any persistent symptoms at 3-5 days or if they worsen at any time  Chief Complaint     Chief Complaint   Patient presents with   • Earache     2 weeks         History of Present Illness       15-year-old female patient a 1 week history of worsening bilateral ear pressure, right greater than left  She notes decreased hearing and radiation of the symptoms on the right side to the lateral neck  Denies any bleeding or draining from the ear  Denies actual pain  States she did have a sore throat previously that has begun to improve  Denies any significant congestion or runny nose currently  Review of Systems   Review of Systems   Constitutional: Negative for chills and fever  HENT: Positive for ear pain and hearing loss  Negative for sore throat  Eyes: Negative for pain and visual disturbance  Respiratory: Negative for cough and shortness of breath  Cardiovascular: Negative for chest pain and palpitations  Gastrointestinal: Negative for abdominal pain and vomiting  Genitourinary: Negative for dysuria and hematuria  Musculoskeletal: Negative for arthralgias and back pain  Skin: Negative for color change and rash  Neurological: Negative for seizures and syncope  All other systems reviewed and are negative          Current Medications       Current Outpatient Medications:   •  amLODIPine (NORVASC) 5 mg tablet, TAKE 1 TABLET DAILY  , Disp: 90 tablet, Rfl: 0  •  Cholecalciferol (VITAMIN D3 PO), Take 4,000 Units by mouth, Disp: , Rfl:   •  clobetasol (TEMOVATE) 0 05 % external solution, Apply topically in the morning, Disp: 50 mL, Rfl: 0  •  fluticasone (FLONASE) 50 mcg/act nasal spray, 2 sprays into each nostril daily, Disp: 18 2 mL, Rfl: 6  •  hydrOXYzine HCL (ATARAX) 50 mg tablet, Take 1 tablet (50 mg total) by mouth 3 (three) times a day as needed for itching, Disp: 30 tablet, Rfl: 0  •  lisinopril (ZESTRIL) 20 mg tablet, TAKE ONE TABLET DAILY   , Disp: 90 tablet, Rfl: 0  •  mesalamine (LIALDA) 1 2 g EC tablet, Take 1,200 mg by mouth 4 (four) times a day, Disp: , Rfl:   •  Mibelas 24 Fe 1-20 MG-MCG(24) CHEW, , Disp: , Rfl:   •  Multiple Vitamin (multivitamin) tablet, Take 1 tablet by mouth daily, Disp: , Rfl:   •  omeprazole (PriLOSEC) 20 mg delayed release capsule, , Disp: , Rfl:   •  predniSONE 20 mg tablet, Take 3 tabs all at once daily for 3 days then 2 tabs for 2 days then 1 tab for 2 days  , Disp: 15 tablet, Rfl: 0  •  norgestrel-ethinyl estradiol (LO/OVRAL) 0 3 mg-30 mcg per tablet, Take 1 tablet by mouth daily (Patient not taking: No sig reported), Disp: , Rfl:     Current Allergies     Allergies as of 10/25/2022   • (No Known Allergies)            The following portions of the patient's history were reviewed and updated as appropriate: allergies, current medications, past family history, past medical history, past social history, past surgical history and problem list      Past Medical History:   Diagnosis Date   • COVID-19 vaccine administered     [employer] in March 2021   • Crohn disease ([facility] [address] )    • Hypertension    • IBS (irritable bowel syndrome)    • Inflammatory bowel disease 2003    Crohns dx 11/2020   • Kidney stone 06/2021    Lithotripsy done 7/2021   • Obstruction of right ureteropelvic junction due to stone    • PONV (postoperative nausea and vomiting) Pt requests to be pre-medicated for N/V prior to sx   • Psoriatic arthritis ([address] )    • Wears glasses        Past Surgical History:   Procedure Laterality Date   • CHOLECYSTECTOMY     • TX CYSTOURETHROSCOPY,URETER CATHETER Right 6/18/2021    Procedure: CYSTOSCOPY RETROGRADE PYELOGRAM WITH INSERTION STENT URETERAL;  Surgeon: Justice Mcdowell MD;  Location: [facility] MAIN OR;  Service: Urology   • TX FRAGMENT KIDNEY STONE/ ESWL Right 7/16/2021    Procedure: LITHOTRIPSY EXTRACORPORAL SHOCKWAVE (ESWL); Surgeon: Justice Mcdowell MD;  Location: [facility] MAIN OR;  Service: Urology   • TONSILLECTOMY         Family History   Problem Relation Age of Onset   • Diabetes Mother    • Hypertension Father    • Heart attack Father    • No Known Problems Maternal Grandmother    • No Known Problems Maternal Grandfather    • No Known Problems Paternal Grandmother    • No Known Problems Paternal Grandfather    • No Known Problems Maternal Aunt    • No Known Problems Maternal Aunt    • Breast cancer Paternal Aunt 39         Medications have been verified  Objective   /80   Pulse 92   Temp 97 7 °F (36 5 °C)   Resp 20   Wt 92 5 kg (204 lb)   SpO2 98%   BMI 39 84 kg/m²        Physical Exam     Physical Exam  Constitutional:       Appearance: Normal appearance  HENT:      Head: Normocephalic  Ears:      Comments: Bilateral TM retraction with duskiness  No middle ear effusions seen  Some mild injection bilaterally  No perforation seen  Nose: Nose normal  No congestion or rhinorrhea  Mouth/Throat:      Mouth: Mucous membranes are moist       Pharynx: Oropharynx is clear  Eyes:      Conjunctiva/sclera: Conjunctivae normal       Pupils: Pupils are equal, round, and reactive to light  Cardiovascular:      Rate and Rhythm: Normal rate  Pulmonary:      Effort: Pulmonary effort is normal    Musculoskeletal:         General: Normal range of motion  Cervical back: Normal range of motion     Skin:     General: Skin is warm and dry  Capillary Refill: Capillary refill takes less than 2 seconds  Neurological:      General: No focal deficit present  Mental Status: She is alert and oriented to person, place, and time     Psychiatric:         Mood and Affect: Mood normal          Behavior: Behavior normal

## 2023-01-06 DIAGNOSIS — I10 PRIMARY HYPERTENSION: ICD-10-CM

## 2023-01-06 RX ORDER — LISINOPRIL 20 MG/1
TABLET ORAL
Qty: 90 TABLET | Refills: 0 | Status: SHIPPED | OUTPATIENT
Start: 2023-01-06

## 2023-01-06 RX ORDER — AMLODIPINE BESYLATE 5 MG/1
TABLET ORAL
Qty: 90 TABLET | Refills: 0 | Status: SHIPPED | OUTPATIENT
Start: 2023-01-06

## 2023-01-31 ENCOUNTER — TELEPHONE (OUTPATIENT)
Dept: OTHER | Facility: OTHER | Age: 43
End: 2023-01-31

## 2023-01-31 ENCOUNTER — OFFICE VISIT (OUTPATIENT)
Dept: FAMILY MEDICINE CLINIC | Facility: CLINIC | Age: 43
End: 2023-01-31

## 2023-01-31 VITALS
DIASTOLIC BLOOD PRESSURE: 68 MMHG | HEART RATE: 110 BPM | BODY MASS INDEX: 39.78 KG/M2 | OXYGEN SATURATION: 98 % | HEIGHT: 60 IN | SYSTOLIC BLOOD PRESSURE: 118 MMHG | WEIGHT: 202.6 LBS | TEMPERATURE: 98.8 F

## 2023-01-31 DIAGNOSIS — I10 PRIMARY HYPERTENSION: Primary | ICD-10-CM

## 2023-01-31 DIAGNOSIS — M26.621 ARTHRALGIA OF RIGHT TEMPOROMANDIBULAR JOINT: ICD-10-CM

## 2023-01-31 DIAGNOSIS — R00.0 INCREASED HEART RATE: ICD-10-CM

## 2023-01-31 RX ORDER — MESALAMINE 0.38 G/1
CAPSULE, EXTENDED RELEASE ORAL
COMMUNITY
Start: 2023-01-06

## 2023-01-31 RX ORDER — METOPROLOL SUCCINATE 25 MG/1
25 TABLET, EXTENDED RELEASE ORAL DAILY
Qty: 30 TABLET | Refills: 0 | Status: SHIPPED | OUTPATIENT
Start: 2023-01-31 | End: 2023-03-02

## 2023-01-31 RX ORDER — SUCRALFATE 1 G/1
TABLET ORAL
COMMUNITY
Start: 2023-01-19

## 2023-01-31 NOTE — PROGRESS NOTES
Assessment/Plan:    No problem-specific Assessment & Plan notes found for this encounter  Diagnoses and all orders for this visit:    Primary hypertension  Comments:  bp - nl, switched Norvasc to metoprolol to help to control her increased heart rate,tsh - nl, ECG- NSR  Orders:  -     metoprolol succinate (TOPROL-XL) 25 mg 24 hr tablet; Take 1 tablet (25 mg total) by mouth daily Please take half a tablet per day  Increased heart rate  Comments:  tsh - nl, POCTECG - NSR, started toprol xl 12 5 mg daily  Orders:  -     POCT ECG  -     metoprolol succinate (TOPROL-XL) 25 mg 24 hr tablet; Take 1 tablet (25 mg total) by mouth daily Please take half a tablet per day  BMI 39 0-39 9,adult  -     Ambulatory Referral to Nutrition Services; Future    Arthralgia of right temporomandibular joint  -     Ambulatory Referral to Oral Maxillofacial Surgery; Future  -     Diclofenac Sodium (VOLTAREN) 1 %; Apply 2 g topically 4 (four) times a day    Other orders  -     sucralfate (CARAFATE) 1 g tablet  -     mesalamine (APRISO) 0 375 g 24 hr capsule              Asked patient to monitor her heart rate via  her apple watch  Bring the log with her in 4 weeks  We will start beta-blockers to control her HR and BP  BP in the clinic soft, stopped Norvasc 5 mg  Subjective:      Patient ID: Shonna Lopez is a 38 y o  female  Hypertension  This is a chronic problem  The current episode started more than 1 year ago  The problem is controlled  Associated symptoms include palpitations  Pertinent negatives include no anxiety, blurred vision, chest pain, headaches, neck pain, peripheral edema, PND or shortness of breath  Risk factors for coronary artery disease include obesity and dyslipidemia  The current treatment provides significant improvement  There are no compliance problems  Patient follows up with rheumatologist regarding joint pain, she sees GI for Crohn's disease  FUP w/ gyn Dr Marva Lewis      The following portions of the patient's history were reviewed and updated as appropriate: allergies, current medications, past family history, past medical history, past social history, past surgical history and problem list     Review of Systems   Constitutional: Negative for chills and fever  HENT: Negative for ear pain and sore throat  Eyes: Negative for blurred vision, pain and visual disturbance  Respiratory: Negative for cough and shortness of breath  Cardiovascular: Positive for palpitations  Negative for chest pain and PND  Gastrointestinal: Negative for abdominal pain and vomiting  Genitourinary: Negative for difficulty urinating, dysuria and hematuria  Musculoskeletal: Negative for neck pain  Skin: Negative for color change and rash  Neurological: Negative for dizziness, seizures, syncope, numbness and headaches  All other systems reviewed and are negative  Objective:      /68 (BP Location: Left arm, Patient Position: Sitting)   Pulse (!) 110   Temp 98 8 °F (37 1 °C) (Tympanic)   Ht 5' (1 524 m)   Wt 91 9 kg (202 lb 9 6 oz)   SpO2 98%   BMI 39 57 kg/m²          Physical Exam  Vitals reviewed  Constitutional:       General: She is not in acute distress  Appearance: She is obese  She is not toxic-appearing  HENT:      Head: Normocephalic and atraumatic  Right Ear: Tympanic membrane normal       Left Ear: Tympanic membrane normal       Nose: Nose normal       Mouth/Throat:      Mouth: Mucous membranes are moist       Pharynx: Oropharynx is clear  No oropharyngeal exudate  Eyes:      Conjunctiva/sclera: Conjunctivae normal    Cardiovascular:      Rate and Rhythm: Regular rhythm  Tachycardia present  Heart sounds: Normal heart sounds  No murmur heard  No gallop  Pulmonary:      Effort: Pulmonary effort is normal       Breath sounds: Normal breath sounds  Abdominal:      General: Abdomen is flat  Bowel sounds are normal       Palpations: Abdomen is soft  Musculoskeletal:      Cervical back: Normal range of motion  Right lower leg: No edema  Left lower leg: No edema  Skin:     General: Skin is warm  Capillary Refill: Capillary refill takes less than 2 seconds  Neurological:      General: No focal deficit present  Mental Status: She is alert and oriented to person, place, and time  Psychiatric:         Mood and Affect: Mood normal          Thought Content:  Thought content normal          Judgment: Judgment normal

## 2023-01-31 NOTE — TELEPHONE ENCOUNTER
[address] called in stating they need clarification on directions for PT's Toprol 25 mg medication

## 2023-02-01 ENCOUNTER — TELEPHONE (OUTPATIENT)
Dept: FAMILY MEDICINE CLINIC | Facility: CLINIC | Age: 43
End: 2023-02-01

## 2023-02-01 NOTE — TELEPHONE ENCOUNTER
Pharmacy called if you can resend metoprolol rx with new directions  There are two set of directions on there  Thanks!

## 2023-03-08 ENCOUNTER — OFFICE VISIT (OUTPATIENT)
Dept: FAMILY MEDICINE CLINIC | Facility: CLINIC | Age: 43
End: 2023-03-08

## 2023-03-08 VITALS
TEMPERATURE: 98.9 F | DIASTOLIC BLOOD PRESSURE: 78 MMHG | OXYGEN SATURATION: 97 % | WEIGHT: 203 LBS | HEIGHT: 60 IN | SYSTOLIC BLOOD PRESSURE: 128 MMHG | HEART RATE: 90 BPM | BODY MASS INDEX: 39.85 KG/M2

## 2023-03-08 DIAGNOSIS — Z13.29 SCREENING FOR ENDOCRINE, METABOLIC AND IMMUNITY DISORDER: ICD-10-CM

## 2023-03-08 DIAGNOSIS — Z13.228 SCREENING FOR ENDOCRINE, METABOLIC AND IMMUNITY DISORDER: ICD-10-CM

## 2023-03-08 DIAGNOSIS — Z13.0 SCREENING FOR ENDOCRINE, METABOLIC AND IMMUNITY DISORDER: ICD-10-CM

## 2023-03-08 DIAGNOSIS — E66.09 CLASS 2 OBESITY DUE TO EXCESS CALORIES WITHOUT SERIOUS COMORBIDITY WITH BODY MASS INDEX (BMI) OF 39.0 TO 39.9 IN ADULT: ICD-10-CM

## 2023-03-08 DIAGNOSIS — R06.83 SNORING: ICD-10-CM

## 2023-03-08 DIAGNOSIS — I10 PRIMARY HYPERTENSION: Primary | ICD-10-CM

## 2023-03-08 DIAGNOSIS — R00.0 INCREASED HEART RATE: ICD-10-CM

## 2023-03-08 NOTE — PROGRESS NOTES
Assessment/Plan:    No problem-specific Assessment & Plan notes found for this encounter  Diagnoses and all orders for this visit:    Primary hypertension  Comments:  Doing okay after switching Norvasc to metoprolol  Vitals normal   HR at home wnl    Class 2 obesity due to excess calories without serious comorbidity with body mass index (BMI) of 39 0 to 39 9 in adult  Comments:  Recommended healthy diet and exercise  Orders:  -     Diagnostic Sleep Study; Future    Screening for endocrine, metabolic and immunity disorder  -     CBC and differential; Future  -     Comprehensive metabolic panel; Future  -     Lipid panel; Future  -     Vitamin D 25 hydroxy; Future  -     TSH, 3rd generation with Free T4 reflex; Future    Snoring  -     Diagnostic Sleep Study; Future    Increased heart rate  Comments:  Resolved with metoprolol, TSH and ECG NL    Other orders  -     ELDERBERRY PO; Take by mouth         RTC for AWV , ordered the labs  Subjective:      Patient ID: Darek Smith is a 38 y o  female  Hypertension  The current episode started more than 1 year ago  The problem is unchanged  The problem is controlled  Pertinent negatives include no anxiety, blurred vision, chest pain, headaches, malaise/fatigue, neck pain, orthopnea, palpitations, peripheral edema or shortness of breath  There are no associated agents to hypertension  Past treatments include beta blockers  The current treatment provides significant improvement  There are no compliance problems  The following portions of the patient's history were reviewed and updated as appropriate: allergies, current medications, past family history, past medical history, past social history, past surgical history and problem list     Review of Systems   Constitutional: Negative for chills, fever and malaise/fatigue  HENT: Negative for congestion, dental problem, ear pain and sore throat  Eyes: Negative for blurred vision, pain and visual disturbance  Respiratory: Negative for cough, shortness of breath and wheezing  Cardiovascular: Negative for chest pain, palpitations, orthopnea and leg swelling  Gastrointestinal: Negative for abdominal pain, nausea and vomiting  Genitourinary: Negative for dysuria, hematuria, vaginal bleeding, vaginal discharge and vaginal pain  Musculoskeletal: Negative for arthralgias, back pain and neck pain  Skin: Negative for color change and rash  Neurological: Negative for dizziness, seizures, syncope, weakness and headaches  All other systems reviewed and are negative  Objective:      /78 (BP Location: Left arm, Patient Position: Sitting)   Pulse 90   Temp 98 9 °F (37 2 °C) (Tympanic)   Ht 5' (1 524 m)   Wt 92 1 kg (203 lb)   SpO2 97%   BMI 39 65 kg/m²          Physical Exam  Vitals reviewed  Constitutional:       General: She is not in acute distress  Appearance: Normal appearance  She is not toxic-appearing  HENT:      Head: Normocephalic and atraumatic  Nose: Nose normal       Mouth/Throat:      Mouth: Mucous membranes are moist       Pharynx: No oropharyngeal exudate or posterior oropharyngeal erythema  Eyes:      Extraocular Movements: Extraocular movements intact  Conjunctiva/sclera: Conjunctivae normal    Cardiovascular:      Rate and Rhythm: Normal rate and regular rhythm  Pulses: Normal pulses  Heart sounds: No murmur heard  No gallop  Pulmonary:      Effort: Pulmonary effort is normal       Breath sounds: Normal breath sounds  Abdominal:      General: Abdomen is flat  Bowel sounds are normal       Palpations: Abdomen is soft  Musculoskeletal:      Cervical back: Normal range of motion  Right lower leg: No edema  Left lower leg: No edema  Skin:     General: Skin is warm  Capillary Refill: Capillary refill takes less than 2 seconds  Neurological:      General: No focal deficit present        Mental Status: She is alert and oriented to person, place, and time  Psychiatric:         Mood and Affect: Mood normal          Thought Content:  Thought content normal          Judgment: Judgment normal

## 2023-03-21 ENCOUNTER — TELEPHONE (OUTPATIENT)
Dept: SLEEP CENTER | Facility: CLINIC | Age: 43
End: 2023-03-21

## 2023-03-21 NOTE — TELEPHONE ENCOUNTER
----- Message from Joseph Truong MD sent at 3/20/2023  4:37 PM EDT -----  Approved    ----- Message -----  From: Justin Green  Sent: 3/13/2023   9:34 AM EDT  To: Sleep Medicine [facility]ul[facility] Truong Provider    This [employer] sleep study needs approval      If approved please sign and return to clerical pool  If denied please include reasons why  Also provide alternative testing if warranted  Please sign and return to clerical pool

## 2023-03-27 DIAGNOSIS — R00.0 INCREASED HEART RATE: ICD-10-CM

## 2023-03-27 DIAGNOSIS — I10 PRIMARY HYPERTENSION: ICD-10-CM

## 2023-03-27 RX ORDER — METOPROLOL SUCCINATE 25 MG/1
25 TABLET, EXTENDED RELEASE ORAL DAILY
Qty: 30 TABLET | Refills: 0 | Status: SHIPPED | OUTPATIENT
Start: 2023-03-27 | End: 2023-04-26

## 2023-03-30 ENCOUNTER — HOSPITAL ENCOUNTER (OUTPATIENT)
Dept: SLEEP CENTER | Facility: HOSPITAL | Age: 43
Discharge: HOME | End: 2023-03-30

## 2023-03-30 DIAGNOSIS — E66.09 CLASS 2 OBESITY DUE TO EXCESS CALORIES WITHOUT SERIOUS COMORBIDITY WITH BODY MASS INDEX (BMI) OF 39.0 TO 39.9 IN ADULT: ICD-10-CM

## 2023-03-30 DIAGNOSIS — R06.83 SNORING: ICD-10-CM

## 2023-03-31 NOTE — PROGRESS NOTES
Sleep Study Documentation    Pre-Sleep Study       Sleep testing procedure explained to patient:YES    Patient napped prior to study:NO    [address] [place] worker after 12PM   Caffeine use:NO    Alcohol:Dayshift workers after 5PM: Alcohol use:NO    Typical day for patient:YES       Study Documentation    Sleep Study Indications: The patient is here for snoring, un-refreshing sleep, BMI>30 and chronic/am headaches  Sleep Study: Diagnostic   Snore: Moderate  Supplemental O2: no    O2 flow rate (L/min) range N/A  O2 flow rate (L/min) final N/A  Minimum SaO2 91  Baseline SaO2 94        Mode of Therapy:N/A    EKG abnormalities: no     EEG abnormalities: no    Sleep Study Recorded < 2 hours: N/A    Sleep Study Recorded > 2 hours but incomplete study: N/A    Sleep Study Recorded 6 hours but no sleep obtained: NO    Patient classification: employed       Post-Sleep Study    Medication used at bedtime or during sleep study:YES other prescription medications    Patient reports time it took to fall asleep:20 to 30 minutes    Patient reports waking up during study:3 or more times  Patient reports returning to sleep in 10 to 30 minutes  Patient reports sleeping 4 to 6 hours with dreaming  Patient reports sleep during study:better than usual    Patient rated sleepiness: Somewhat sleepy or tired    PAP treatment:no

## 2023-04-01 ENCOUNTER — TELEPHONE (OUTPATIENT)
Dept: FAMILY MEDICINE CLINIC | Facility: CLINIC | Age: 43
End: 2023-04-01

## 2023-04-01 NOTE — TELEPHONE ENCOUNTER
Called and spoke with Marilin  Discussed her sleeps study result  IMPRESSION:   1  Snoring with features of upper airway resistance -severity may be mitigated by sleep position  2  Sleep onset and sleep maintenance insomnia     Mild sleep disordered breathing may resolve with weight reduction and addressing causes of upper airway resistance  Other treatment options that may be considered include Myofunctional therapy, Oral Appliance therapy or upper airway surgery  Clinical correlation is advised  Plan   She will try to lose weight  She will be seen in the clinic in July  if weight loss is not successful with  Diet and exercise  Then we will try medication option for her

## 2023-04-04 DIAGNOSIS — I10 PRIMARY HYPERTENSION: ICD-10-CM

## 2023-04-04 RX ORDER — LISINOPRIL 20 MG/1
20 TABLET ORAL DAILY
Qty: 90 TABLET | Refills: 0 | Status: SHIPPED | OUTPATIENT
Start: 2023-04-04

## 2023-05-05 ENCOUNTER — OFFICE VISIT (OUTPATIENT)
Dept: URGENT CARE | Facility: CLINIC | Age: 43
End: 2023-05-05

## 2023-05-05 VITALS
DIASTOLIC BLOOD PRESSURE: 110 MMHG | BODY MASS INDEX: 39.65 KG/M2 | OXYGEN SATURATION: 100 % | SYSTOLIC BLOOD PRESSURE: 168 MMHG | TEMPERATURE: 101 F | WEIGHT: 203 LBS | RESPIRATION RATE: 20 BRPM

## 2023-05-05 DIAGNOSIS — J06.9 VIRAL URI: Primary | ICD-10-CM

## 2023-05-05 DIAGNOSIS — R50.9 FEVER, UNSPECIFIED FEVER CAUSE: ICD-10-CM

## 2023-05-05 DIAGNOSIS — J02.9 SORE THROAT: ICD-10-CM

## 2023-05-05 LAB
S PYO AG THROAT QL: NEGATIVE
SARS-COV-2 AG UPPER RESP QL IA: NEGATIVE
VALID CONTROL: NORMAL

## 2023-05-05 NOTE — PROGRESS NOTES
[facility]    NAME: Vicenta Lujan is a 38 y o  female  : 1980    MRN: [id number]  DATE: May 5, 2023  TIME: 2:42 PM    Assessment and Plan   Viral URI [J06 9]  1  Viral URI        2  Fever, unspecified fever cause  POCT rapid strepA    Throat culture    Poct Covid 19 Rapid Antigen Test    COVID Only -Office Collect      3  Sore throat  POCT rapid strepA    Throat culture    Poct Covid 19 Rapid Antigen Test    COVID Only -Office Collect          Patient Instructions   Patient Instructions   Quarantine until test results are back   Vitamin D3 2000 IU daily  Vitamin C 1g every 12 hours  Multivitamin daily  Fluids and rest  Flonase 2 sprays each nostril once daily  Zyrtec   Over the counter cold medication as needed such as mucinex  Ibuprofen and/or tylenol for fever, body aches  Follow up with PCP upon confirmation of a positive covid test   Proceed to  ER if symptoms worsen  Stay home except to get medical care    People who are mildly ill with COVID-19 are able to isolate at home during their illness  You should restrict activities outside your home, except for getting medical care  Do not go to work, school, or public areas  Avoid using public transportation, ride-sharing, or taxis  Separate yourself from other people     As much as possible, you should stay in a specific room and away from other people in your home  Also, you should use a separate bathroom, if available  Call ahead before visiting your doctor    If you have a medical appointment, call the healthcare provider and tell them that you have or may have COVID-19  This will help the healthcare providers office take steps to keep other people from getting infected or exposed  Wear a facemask    You should wear a facemask when you are around other people (e g , sharing a room or vehicle) or pets and before you enter a healthcare providers office   If you are not able to wear a facemask (for example, because it causes trouble breathing), then people who live with you should not stay in the same room with you, or they should wear a facemask if they enter your room  Monitor your symptoms    Seek prompt medical attention if your illness is worsening (e g , difficulty breathing)  Before seeking care, call your healthcare provider and tell them that you have, or are being evaluated for, COVID-19  Put on a facemask before you enter the facility  These steps will help the healthcare providers office to keep other people in the office or waiting room from getting infected or exposed  Ask your healthcare provider to call the local or [facility] health department  Persons who are placed under active monitoring or facilitated self-monitoring should follow instructions provided by their local health department or occupational health professionals, as appropriate  If you have a medical emergency and need to call 911, notify the dispatch personnel that you have, or are being evaluated for COVID-19  If possible, put on a facemask before emergency medical services arrive  Please follow Quarantine instructions provided in your work or school note  Chief Complaint     Chief Complaint   Patient presents with    Cold Like Symptoms    Cough     Sinus pressure       History of Present Illness   38year old female here with complaint of cough, nasal congestion, sore throat and fever for the past 3 days  Review of Systems   Review of Systems   Constitutional: Positive for chills, fatigue and fever  Negative for appetite change  HENT: Positive for congestion, ear pain, postnasal drip, sinus pressure and sore throat  Negative for ear discharge, facial swelling and sneezing  Respiratory: Positive for cough  Negative for shortness of breath and wheezing  Neurological: Positive for headaches         Current Medications     Current Outpatient Medications:     Cholecalciferol (VITAMIN D3 PO), Take 4,000 Units by mouth, Disp: , Rfl:    clobetasol (TEMOVATE) 0 05 % external solution, Apply topically in the morning, Disp: 50 mL, Rfl: 0    Diclofenac Sodium (VOLTAREN) 1 %, Apply 2 g topically 4 (four) times a day (Patient not taking: Reported on 3/8/2023), Disp: 350 g, Rfl: 2    ELDERBERRY PO, Take by mouth, Disp: , Rfl:     fluticasone (FLONASE) 50 mcg/act nasal spray, 2 sprays into each nostril daily, Disp: 18 2 mL, Rfl: 6    hydrOXYzine HCL (ATARAX) 50 mg tablet, Take 1 tablet (50 mg total) by mouth 3 (three) times a day as needed for itching (Patient not taking: Reported on 1/31/2023), Disp: 30 tablet, Rfl: 0    lisinopril (ZESTRIL) 20 mg tablet, Take 1 tablet (20 mg total) by mouth daily, Disp: 90 tablet, Rfl: 0    mesalamine (APRISO) 0 375 g 24 hr capsule, , Disp: , Rfl:     mesalamine (LIALDA) 1 2 g EC tablet, Take 1,200 mg by mouth 4 (four) times a day (Patient not taking: Reported on 1/31/2023), Disp: , Rfl:     metoprolol succinate (TOPROL-XL) 25 mg 24 hr tablet, Take 1 tablet (25 mg total) by mouth daily Please take half a tablet per day , Disp: 30 tablet, Rfl: 0    Mibelas 24 Fe 1-20 MG-MCG(24) CHEW, , Disp: , Rfl:     Multiple Vitamin (multivitamin) tablet, Take 1 tablet by mouth daily, Disp: , Rfl:     norgestrel-ethinyl estradiol (LO/OVRAL) 0 3 mg-30 mcg per tablet, Take 1 tablet by mouth daily (Patient not taking: Reported on 1/31/2022), Disp: , Rfl:     omeprazole (PriLOSEC) 20 mg delayed release capsule, daily, Disp: , Rfl:     predniSONE 20 mg tablet, Take 3 tabs all at once daily for 3 days then 2 tabs for 2 days then 1 tab for 2 days   (Patient not taking: Reported on 1/31/2023), Disp: 15 tablet, Rfl: 0    sucralfate (CARAFATE) 1 g tablet, 2 (two) times a day, Disp: , Rfl:     Current Allergies     Allergies as of 05/05/2023    (No Known Allergies)          The following portions of the patient's history were reviewed and updated as appropriate: allergies, current medications, past family history, past medical history, past social history, past surgical history and problem list    Past Medical History:   Diagnosis Date    COVID-19 vaccine administered     [employer] in March 2021    Crohn disease ([facility] )     Hypertension     IBS (irritable bowel syndrome)     Inflammatory bowel disease 2003    Crohns dx 11/2020    Kidney stone 06/2021    Lithotripsy done 7/2021    Obstruction of right ureteropelvic junction due to stone     PONV (postoperative nausea and vomiting)     Pt requests to be pre-medicated for N/V prior to sx    Psoriatic arthritis ([facility] )     Wears glasses      Past Surgical History:   Procedure Laterality Date    CHOLECYSTECTOMY      NV CYSTO BLADDER W/URETERAL CATHETERIZATION Right 6/18/2021    Procedure: CYSTOSCOPY RETROGRADE PYELOGRAM WITH INSERTION STENT URETERAL;  Surgeon: Marinao Meyer MD;  Location: [address] [place] [address] MAIN OR;  Service: Urology    NV LITHOTRIPSY [address]  Right 7/16/2021    Procedure: LITHOTRIPSY EXTRACORPORAL SHOCKWAVE (ESWL); Surgeon: Mariano Meyer MD;  Location:  MAIN OR;  Service: Urology    TONSILLECTOMY       Family History   Problem Relation Age of Onset    Diabetes Mother     Hypertension Father     Heart attack Father     No Known Problems Maternal Grandmother     No Known Problems Maternal Grandfather     No Known Problems Paternal Grandmother     No Known Problems Paternal Grandfather     No Known Problems Maternal Aunt     No Known Problems Maternal Aunt     Breast cancer Paternal Aunt 39     Social History     Socioeconomic History    Marital status: /Civil Union     Spouse name: Not on file    Number of children: Not on file    Years of education: Not on file    Highest education level: Not on file   Occupational History    Not on file   Tobacco Use    Smoking status: Never    Smokeless tobacco: Never   Vaping Use    Vaping Use: Never used   Substance and Sexual Activity    Alcohol use:  Yes     Alcohol/week: 0 0 standard drinks     Comment: Social drinker    Drug use: Never    Sexual activity: Yes     Partners: Male     Birth control/protection: OCP   Other Topics Concern    Not on file   Social History Narrative    Not on file     Social Determinants of Health     Financial Resource Strain: Not on file   Food Insecurity: Not on file   Transportation Needs: Not on file   Physical Activity: Not on file   Stress: Not on file   Social Connections: Not on file   Intimate Partner Violence: Not on file   Housing Stability: Not on file     Medications have been verified  Objective   BP (!) 168/110   Temp (!) 101 °F (38 3 °C)   Resp 20   Wt 92 1 kg (203 lb)   SpO2 100%   BMI 39 65 kg/m²      Physical Exam   Physical Exam  Vitals and nursing note reviewed  Constitutional:       General: She is not in acute distress  Appearance: She is well-developed  HENT:      Head: Normocephalic and atraumatic  Right Ear: Tympanic membrane normal       Left Ear: Tympanic membrane normal       Nose: Congestion and rhinorrhea present  No mucosal edema  Right Sinus: No maxillary sinus tenderness or frontal sinus tenderness  Left Sinus: No maxillary sinus tenderness or frontal sinus tenderness  Mouth/Throat:      Pharynx: No oropharyngeal exudate or posterior oropharyngeal erythema  Eyes:      Conjunctiva/sclera: Conjunctivae normal    Cardiovascular:      Rate and Rhythm: Normal rate and regular rhythm  Heart sounds: Normal heart sounds  No murmur heard  Pulmonary:      Effort: Pulmonary effort is normal       Breath sounds: Normal breath sounds

## 2023-05-07 LAB
BACTERIA THROAT CULT: NORMAL
SARS-COV-2 RNA RESP QL NAA+PROBE: NEGATIVE

## 2023-05-11 ENCOUNTER — APPOINTMENT (EMERGENCY)
Dept: RADIOLOGY | Facility: HOSPITAL | Age: 43
End: 2023-05-11

## 2023-05-11 ENCOUNTER — HOSPITAL ENCOUNTER (EMERGENCY)
Facility: HOSPITAL | Age: 43
Discharge: HOME | End: 2023-05-11
Attending: EMERGENCY MEDICINE

## 2023-05-11 VITALS
WEIGHT: 199 LBS | DIASTOLIC BLOOD PRESSURE: 89 MMHG | BODY MASS INDEX: 39.07 KG/M2 | TEMPERATURE: 101.1 F | HEIGHT: 60 IN | HEART RATE: 98 BPM | RESPIRATION RATE: 16 BRPM | SYSTOLIC BLOOD PRESSURE: 160 MMHG | OXYGEN SATURATION: 97 %

## 2023-05-11 DIAGNOSIS — R05.1 ACUTE COUGH: ICD-10-CM

## 2023-05-11 DIAGNOSIS — R50.9 FEVER: ICD-10-CM

## 2023-05-11 DIAGNOSIS — J18.9 PNEUMONIA: Primary | ICD-10-CM

## 2023-05-11 LAB
ALBUMIN SERPL BCP-MCNC: 4.1 G/DL (ref 3.5–5)
ALP SERPL-CCNC: 66 U/L (ref 34–104)
ALT SERPL W P-5'-P-CCNC: 19 U/L (ref 7–52)
ANION GAP SERPL CALCULATED.3IONS-SCNC: 12 MMOL/L (ref 4–13)
APTT PPP: 28 SECONDS (ref 23–37)
AST SERPL W P-5'-P-CCNC: 19 U/L (ref 13–39)
BASOPHILS # BLD AUTO: 0.05 THOUSANDS/ÂΜL (ref 0–0.1)
BASOPHILS NFR BLD AUTO: 0 % (ref 0–1)
BILIRUB SERPL-MCNC: 0.4 MG/DL (ref 0.2–1)
BILIRUB UR QL STRIP: NEGATIVE
BUN SERPL-MCNC: 9 MG/DL (ref 5–25)
CALCIUM SERPL-MCNC: 8.9 MG/DL (ref 8.4–10.2)
CHLORIDE SERPL-SCNC: 101 MMOL/L (ref 96–108)
CLARITY UR: CLEAR
CO2 SERPL-SCNC: 26 MMOL/L (ref 21–32)
COLOR UR: YELLOW
CREAT SERPL-MCNC: 0.93 MG/DL (ref 0.6–1.3)
EOSINOPHIL # BLD AUTO: 0.18 THOUSAND/ÂΜL (ref 0–0.61)
EOSINOPHIL NFR BLD AUTO: 2 % (ref 0–6)
ERYTHROCYTE [DISTWIDTH] IN BLOOD BY AUTOMATED COUNT: 12.6 % (ref 11.6–15.1)
FLUAV RNA RESP QL NAA+PROBE: NEGATIVE
FLUBV RNA RESP QL NAA+PROBE: NEGATIVE
GFR SERPL CREATININE-BSD FRML MDRD: 74 ML/MIN/1.73SQ M
GLUCOSE SERPL-MCNC: 115 MG/DL (ref 65–140)
GLUCOSE UR STRIP-MCNC: NEGATIVE MG/DL
HCT VFR BLD AUTO: 40.5 % (ref 34.8–46.1)
HGB BLD-MCNC: 13.1 G/DL (ref 11.5–15.4)
HGB UR QL STRIP.AUTO: NEGATIVE
IMM GRANULOCYTES # BLD AUTO: 0.04 THOUSAND/UL (ref 0–0.2)
IMM GRANULOCYTES NFR BLD AUTO: 0 % (ref 0–2)
INR PPP: 0.96 (ref 0.84–1.19)
KETONES UR STRIP-MCNC: NEGATIVE MG/DL
LACTATE SERPL-SCNC: 1.1 MMOL/L (ref 0.5–2)
LEUKOCYTE ESTERASE UR QL STRIP: NEGATIVE
LYMPHOCYTES # BLD AUTO: 2.01 THOUSANDS/ÂΜL (ref 0.6–4.47)
LYMPHOCYTES NFR BLD AUTO: 18 % (ref 14–44)
MCH RBC QN AUTO: 27.8 PG (ref 26.8–34.3)
MCHC RBC AUTO-ENTMCNC: 32.3 G/DL (ref 31.4–37.4)
MCV RBC AUTO: 86 FL (ref 82–98)
MONOCYTES # BLD AUTO: 0.99 THOUSAND/ÂΜL (ref 0.17–1.22)
MONOCYTES NFR BLD AUTO: 9 % (ref 4–12)
NEUTROPHILS # BLD AUTO: 7.87 THOUSANDS/ÂΜL (ref 1.85–7.62)
NEUTS SEG NFR BLD AUTO: 71 % (ref 43–75)
NITRITE UR QL STRIP: NEGATIVE
NRBC BLD AUTO-RTO: 0 /100 WBCS
PH UR STRIP.AUTO: 6 [PH]
PLATELET # BLD AUTO: 424 THOUSANDS/UL (ref 149–390)
PMV BLD AUTO: 9.3 FL (ref 8.9–12.7)
POTASSIUM SERPL-SCNC: 3.7 MMOL/L (ref 3.5–5.3)
PROCALCITONIN SERPL-MCNC: <0.05 NG/ML
PROT SERPL-MCNC: 7.1 G/DL (ref 6.4–8.4)
PROT UR STRIP-MCNC: NEGATIVE MG/DL
PROTHROMBIN TIME: 12.8 SECONDS (ref 11.6–14.5)
RBC # BLD AUTO: 4.72 MILLION/UL (ref 3.81–5.12)
RSV RNA RESP QL NAA+PROBE: NEGATIVE
SARS-COV-2 RNA RESP QL NAA+PROBE: NEGATIVE
SODIUM SERPL-SCNC: 139 MMOL/L (ref 135–147)
SP GR UR STRIP.AUTO: 1.01
UROBILINOGEN UR QL STRIP.AUTO: 0.2 E.U./DL
WBC # BLD AUTO: 11.14 THOUSAND/UL (ref 4.31–10.16)

## 2023-05-11 RX ORDER — ACETAMINOPHEN 325 MG/1
650 TABLET ORAL ONCE
Status: COMPLETED | OUTPATIENT
Start: 2023-05-11 | End: 2023-05-11

## 2023-05-11 RX ORDER — KETOROLAC TROMETHAMINE 30 MG/ML
15 INJECTION, SOLUTION INTRAMUSCULAR; INTRAVENOUS ONCE
Status: COMPLETED | OUTPATIENT
Start: 2023-05-11 | End: 2023-05-11

## 2023-05-11 RX ORDER — AZITHROMYCIN 250 MG/1
500 TABLET, FILM COATED ORAL ONCE
Status: COMPLETED | OUTPATIENT
Start: 2023-05-11 | End: 2023-05-11

## 2023-05-11 RX ORDER — AZITHROMYCIN 250 MG/1
250 TABLET, FILM COATED ORAL EVERY 24 HOURS
Qty: 4 TABLET | Refills: 0 | Status: SHIPPED | OUTPATIENT
Start: 2023-05-11 | End: 2023-05-15

## 2023-05-11 RX ORDER — AMOXICILLIN AND CLAVULANATE POTASSIUM 875; 125 MG/1; MG/1
1 TABLET, FILM COATED ORAL EVERY 12 HOURS
Qty: 10 TABLET | Refills: 0 | Status: SHIPPED | OUTPATIENT
Start: 2023-05-11 | End: 2023-05-16

## 2023-05-11 RX ORDER — AMOXICILLIN AND CLAVULANATE POTASSIUM 875; 125 MG/1; MG/1
1 TABLET, FILM COATED ORAL ONCE
Status: COMPLETED | OUTPATIENT
Start: 2023-05-11 | End: 2023-05-11

## 2023-05-11 RX ADMIN — SODIUM CHLORIDE 1000 ML: 0.9 INJECTION, SOLUTION INTRAVENOUS at 22:00

## 2023-05-11 RX ADMIN — AZITHROMYCIN MONOHYDRATE 500 MG: 250 TABLET ORAL at 23:26

## 2023-05-11 RX ADMIN — KETOROLAC TROMETHAMINE 15 MG: 30 INJECTION, SOLUTION INTRAMUSCULAR at 21:59

## 2023-05-11 RX ADMIN — ACETAMINOPHEN 650 MG: 325 TABLET ORAL at 21:38

## 2023-05-11 RX ADMIN — AMOXICILLIN AND CLAVULANATE POTASSIUM 1 TABLET: 875; 125 TABLET, FILM COATED ORAL at 23:26

## 2023-05-12 LAB
ATRIAL RATE: 108 BPM
P AXIS: 37 DEGREES
PR INTERVAL: 132 MS
QRS AXIS: 64 DEGREES
QRSD INTERVAL: 96 MS
QT INTERVAL: 344 MS
QTC INTERVAL: 460 MS
T WAVE AXIS: 8 DEGREES
VENTRICULAR RATE: 108 BPM

## 2023-05-12 NOTE — ED PROVIDER NOTES
History  Chief Complaint   Patient presents with   • Cough     Cough/ fever was seen in urgent care last Friday today temp was 8      53-year-old female with Crohn's disease presents to the emergency department for evaluation of cough and fever  Patient started with symptoms of cough, nasal congestion, sore throat at the beginning of this month  On  she was evaluated at an urgent care where she was tested for COVID and strep throat which were both negative  She states initially her symptoms were improving until today when she developed a fever again associated with worsening nonproductive cough  She also endorses generalized fatigue, myalgias, and some shortness of breath  He also endorses a mild headache  She continues to have a mild sore throat, but no difficulty swallowing or voice change  No neck pain or stiffness  No known sick contacts  No chest pain, abdominal pain, nausea, vomiting, diarrhea  No melena or hematochezia  No urinary symptoms  Prior to Admission Medications   Prescriptions Last Dose Informant Patient Reported? Taking?    Cholecalciferol (VITAMIN D3 PO)   Yes No   Sig: Take 4,000 Units by mouth   Diclofenac Sodium (VOLTAREN) 1 %   No No   Sig: Apply 2 g topically 4 (four) times a day   Patient not taking: Reported on 3/8/2023   ELDERBERRY PO   Yes No   Sig: Take by mouth   Mibelas 24 Fe 1-20 MG-MCG(24) CHEW   Yes No   Multiple Vitamin (multivitamin) tablet   Yes No   Sig: Take 1 tablet by mouth daily   clobetasol (TEMOVATE) 0 05 % external solution   No No   Sig: Apply topically in the morning   fluticasone (FLONASE) 50 mcg/act nasal spray   No No   Si sprays into each nostril daily   hydrOXYzine HCL (ATARAX) 50 mg tablet   No No   Sig: Take 1 tablet (50 mg total) by mouth 3 (three) times a day as needed for itching   Patient not taking: Reported on 2023   lisinopril (ZESTRIL) 20 mg tablet   No No   Sig: Take 1 tablet (20 mg total) by mouth daily   mesalamine (APRISO) 0 375 g 24 hr capsule   Yes No   mesalamine (LIALDA) 1 2 g EC tablet   Yes No   Sig: Take 1,200 mg by mouth 4 (four) times a day   Patient not taking: Reported on 2023   metoprolol succinate (TOPROL-XL) 25 mg 24 hr tablet   No No   Sig: Take 1 tablet (25 mg total) by mouth daily Please take half a tablet per day  norgestrel-ethinyl estradiol (LO/OVRAL) 0 3 mg-30 mcg per tablet   Yes No   Sig: Take 1 tablet by mouth daily   Patient not taking: Reported on 2022   omeprazole (PriLOSEC) 20 mg delayed release capsule   Yes No   Sig: daily   predniSONE 20 mg tablet   No No   Sig: Take 3 tabs all at once daily for 3 days then 2 tabs for 2 days then 1 tab for 2 days  Patient not taking: Reported on 2023   sucralfate (CARAFATE) 1 g tablet   Yes No   Si (two) times a day      Facility-Administered Medications: None       Past Medical History:   Diagnosis Date   • COVID-19 vaccine administered     [employer] in 2021   • Crohn disease ([facility] [address] )    • Hypertension    • IBS (irritable bowel syndrome)    • Inflammatory bowel disease 2003    Crohns dx 2020   • Kidney stone 2021    Lithotripsy done 2021   • Obstruction of right ureteropelvic junction due to stone    • PONV (postoperative nausea and vomiting)     Pt requests to be pre-medicated for N/V prior to sx   • Psoriatic arthritis ([facility] [address] )    • Wears glasses        Past Surgical History:   Procedure Laterality Date   • CHOLECYSTECTOMY     • NC CYSTO BLADDER W/URETERAL CATHETERIZATION Right 2021    Procedure: CYSTOSCOPY RETROGRADE PYELOGRAM WITH INSERTION STENT URETERAL;  Surgeon: Chelsea Gustafson MD;  Location: [address] [place] [address] MAIN OR;  Service: Urology   • NC LITHOTRIPSY [address] Right 2021    Procedure: LITHOTRIPSY EXTRACORPORAL SHOCKWAVE (ESWL);   Surgeon: Chelsea Gustafson MD;  Location:  MAIN OR;  Service: Urology   • TONSILLECTOMY         Family History   Problem Relation Age of Onset   • Diabetes Mother    • Hypertension Father    • Heart attack Father    • No Known Problems Maternal Grandmother    • No Known Problems Maternal Grandfather    • No Known Problems Paternal Grandmother    • No Known Problems Paternal Grandfather    • No Known Problems Maternal Aunt    • No Known Problems Maternal Aunt    • Breast cancer Paternal Aunt 36     I have reviewed and agree with the history as documented  E-Cigarette/Vaping   • E-Cigarette Use Never User      E-Cigarette/Vaping Substances     Social History     Tobacco Use   • Smoking status: Never   • Smokeless tobacco: Never   Vaping Use   • Vaping Use: Never used   Substance Use Topics   • Alcohol use: Yes     Alcohol/week: 0 0 standard drinks     Comment: Social drinker   • Drug use: Never       Review of Systems   Constitutional: Positive for fatigue and fever  Negative for chills  HENT: Positive for congestion and sore throat  Negative for ear pain  Eyes: Negative for pain and visual disturbance  Respiratory: Positive for cough and shortness of breath  Cardiovascular: Negative for chest pain and palpitations  Gastrointestinal: Negative for abdominal pain and vomiting  Genitourinary: Negative for dysuria and hematuria  Musculoskeletal: Positive for back pain and myalgias  Negative for arthralgias  Skin: Negative for color change and rash  Neurological: Negative for seizures and syncope  All other systems reviewed and are negative  Physical Exam  Physical Exam  Vitals and nursing note reviewed  Constitutional:       General: She is not in acute distress  HENT:      Head: Normocephalic and atraumatic  Right Ear: External ear normal       Left Ear: External ear normal       Nose: Nose normal       Mouth/Throat:      Mouth: Mucous membranes are moist    Eyes:      Extraocular Movements: Extraocular movements intact  Conjunctiva/sclera: Conjunctivae normal       Pupils: Pupils are equal, round, and reactive to light     Cardiovascular:      Rate and Rhythm: Regular rhythm  Tachycardia present  Pulses: Normal pulses  Heart sounds: Normal heart sounds  No murmur heard  Pulmonary:      Effort: Pulmonary effort is normal  No respiratory distress  Breath sounds: No stridor  No wheezing  Comments: Coarse breath sounds at the bases  Abdominal:      General: Abdomen is flat  Bowel sounds are normal       Tenderness: There is no abdominal tenderness  There is no guarding or rebound  Musculoskeletal:         General: No deformity  Normal range of motion  Cervical back: Normal range of motion and neck supple  No rigidity  Skin:     General: Skin is warm and dry  Capillary Refill: Capillary refill takes less than 2 seconds  Neurological:      General: No focal deficit present  Mental Status: She is alert and oriented to person, place, and time     Psychiatric:         Mood and Affect: Mood normal          Behavior: Behavior normal          Vital Signs  ED Triage Vitals   Temperature Pulse Respirations Blood Pressure SpO2   05/11/23 2101 05/11/23 2101 05/11/23 2101 05/11/23 2106 05/11/23 2101   (!) 101 1 °F (38 4 °C) (!) 112 16 160/89 97 %      Temp Source Heart Rate Source Patient Position - Orthostatic VS BP Location FiO2 (%)   05/11/23 2101 05/11/23 2101 05/11/23 2101 05/11/23 2101 --   Tympanic Monitor Lying Left arm       Pain Score       05/11/23 2101       2           Vitals:    05/11/23 2101 05/11/23 2106 05/11/23 2252 05/11/23 2306   BP:  160/89     Pulse: (!) 112  103 98   Patient Position - Orthostatic VS: Lying            Visual Acuity      ED Medications  Medications   sodium chloride 0 9 % bolus 1,000 mL (0 mL Intravenous Stopped 5/11/23 2300)   acetaminophen (TYLENOL) tablet 650 mg (650 mg Oral Given 5/11/23 2138)   ketorolac (TORADOL) injection 15 mg (15 mg Intravenous Given 5/11/23 2159)   amoxicillin-clavulanate (AUGMENTIN) 875-125 mg per tablet 1 tablet (1 tablet Oral Given 5/11/23 2326)   azithromycin (ZITHROMAX) tablet 500 mg (500 mg Oral Given 5/11/23 2326)       Diagnostic Studies  Results Reviewed     Procedure Component Value Units Date/Time    Procalcitonin [[id number]]  (Normal) Collected: 05/11/23 2155    Lab Status: Final result Specimen: Blood from Arm, Left Updated: 05/11/23 2314     Procalcitonin <0 05 ng/ml     Comprehensive metabolic panel [[id number]] Collected: 05/11/23 2155    Lab Status: Final result Specimen: Blood from Arm, Left Updated: 05/11/23 2304     Sodium 139 mmol/L      Potassium 3 7 mmol/L      Chloride 101 mmol/L      CO2 26 mmol/L      ANION GAP 12 mmol/L      BUN 9 mg/dL      Creatinine 0 93 mg/dL      Glucose 115 mg/dL      Calcium 8 9 mg/dL      AST 19 U/L      ALT 19 U/L      Alkaline Phosphatase 66 U/L      Total Protein 7 1 g/dL      Albumin 4 1 g/dL      Total Bilirubin 0 40 mg/dL      eGFR 74 ml/min/1 73sq m     Narrative:      [facility] guidelines for Chronic Kidney Disease (CKD):   •  Stage 1 with normal or high GFR (GFR > 90 mL/min/1 73 square meters)  •  Stage 2 Mild CKD (GFR = 60-89 mL/min/1 73 square meters)  •  Stage 3A Moderate CKD (GFR = 45-59 mL/min/1 73 square meters)  •  Stage 3B Moderate CKD (GFR = 30-44 mL/min/1 73 square meters)  •  Stage 4 Severe CKD (GFR = 15-29 mL/min/1 73 square meters)  •  Stage 5 End Stage CKD (GFR <15 mL/min/1 73 square meters)  Note: GFR calculation is accurate only with a steady state creatinine    Protime-INR [[id number]]  (Normal) Collected: 05/11/23 2155    Lab Status: Final result Specimen: Blood from Arm, Left Updated: 05/11/23 2301     Protime 12 8 seconds      INR 0 96    APTT [[id number]]  (Normal) Collected: 05/11/23 2155    Lab Status: Final result Specimen: Blood from Arm, Left Updated: 05/11/23 2301     PTT 28 seconds     CBC and differential [[id number]]  (Abnormal) Collected: 05/11/23 2155    Lab Status: Final result Specimen: Blood from Arm, Left Updated: 05/11/23 2248     WBC 11 14 Thousand/uL      RBC 4 72 Million/uL Hemoglobin 13 1 g/dL      Hematocrit 40 5 %      MCV 86 fL      MCH 27 8 pg      MCHC 32 3 g/dL      RDW 12 6 %      MPV 9 3 fL      Platelets 735 Thousands/uL      nRBC 0 /100 WBCs      Neutrophils Relative 71 %      Immat GRANS % 0 %      Lymphocytes Relative 18 %      Monocytes Relative 9 %      Eosinophils Relative 2 %      Basophils Relative 0 %      Neutrophils Absolute 7 87 Thousands/µL      Immature Grans Absolute 0 04 Thousand/uL      Lymphocytes Absolute 2 01 Thousands/µL      Monocytes Absolute 0 99 Thousand/µL      Eosinophils Absolute 0 18 Thousand/µL      Basophils Absolute 0 05 Thousands/µL     Blood culture #2 [[id number]] Collected: 05/11/23 2155    Lab Status: In process Specimen: Blood from Arm, Right Updated: 05/11/23 2245    Blood culture #1 [[id number]] Collected: 05/11/23 2155    Lab Status: In process Specimen: Blood from Arm, Left Updated: 05/11/23 2245    FLU/RSV/COVID - if FLU/RSV clinically relevant [[id number]]  (Normal) Collected: 05/11/23 2143    Lab Status: Final result Specimen: Nares from Nose Updated: 05/11/23 2244     SARS-CoV-2 Negative     INFLUENZA A PCR Negative     INFLUENZA B PCR Negative     RSV PCR Negative    Narrative:      FOR PEDIATRIC PATIENTS - copy/paste COVID Guidelines URL to browser: [email or web address]  [employer]    SARS-CoV-2 assay is a Nucleic Acid Amplification assay intended for the  qualitative detection of nucleic acid from SARS-CoV-2 in nasopharyngeal  swabs  Results are for the presumptive identification of SARS-CoV-2 RNA  Positive results are indicative of infection with SARS-CoV-2, the virus  causing COVID-19, but do not rule out bacterial infection or co-infection  with other viruses  Laboratories within the [place] and its  territories are required to report all positive results to the appropriate  public health authorities   Negative results do not preclude SARS-CoV-2  infection and should not be used as the sole basis for treatment or other  patient management decisions  Negative results must be combined with  clinical observations, patient history, and epidemiological information  This test has not been FDA cleared or approved  This test has been authorized by FDA under an Emergency Use Authorization  (EUA)  This test is only authorized for the duration of time the  declaration that circumstances exist justifying the authorization of the  emergency use of an in vitro diagnostic tests for detection of SARS-CoV-2  virus and/or diagnosis of COVID-19 infection under section 564(b)(1) of  the Act, 21 U  S C  966VPE-4(O)(8), unless the authorization is terminated  or revoked sooner  The test has been validated but independent review by FDA  and CLIA is pending  Test performed using [employer] [facility] GeneXpert: This RT-PCR assay targets N2,  a region unique to SARS-CoV-2  A conserved region in the E-gene was chosen  for pan-Sarbecovirus detection which includes SARS-CoV-2  According to CMS-2020-01-R, this platform meets the definition of high-throughput technology  Lactic acid [[id number]]  (Normal) Collected: 05/11/23 2155    Lab Status: Final result Specimen: Blood from Arm, Left Updated: 05/11/23 2222     LACTIC ACID 1 1 mmol/L     Narrative:      Result may be elevated if tourniquet was used during collection      UA w Reflex to Microscopic w Reflex to Culture [[id number]]  (Normal) Collected: 05/11/23 2143    Lab Status: Final result Specimen: Urine, Clean Catch Updated: 05/11/23 2210     Color, UA Yellow     Clarity, UA Clear     Specific Gravity, UA 1 010     pH, UA 6 0     Leukocytes, UA Negative     Nitrite, UA Negative     Protein, UA Negative mg/dl      Glucose, UA Negative mg/dl      Ketones, UA Negative mg/dl      Urobilinogen, UA 0 2 E U /dl      Bilirubin, UA Negative     Occult Blood, UA Negative                 XR chest portable   ED Interpretation by Almaz Quick MD (05/11 2322)   Left-sided infiltrate                 Procedures  Procedures         ED Course  ED Course as of 05/12/23 0247   Thu May 11, 2023   2145 EKG interpreted by myself demonstrates sinus tachycardia with a rate of 108, normal axis, normal intervals, normal ST segment and T waves                               SBIRT 20yo+    Flowsheet Row Most Recent Value   Initial Alcohol Screen: US AUDIT-C     1  How often do you have a drink containing alcohol? 2 Filed at: 05/11/2023 2157   2  How many drinks containing alcohol do you have on a typical day you are drinking? 1 Filed at: 05/11/2023 2157   3a  Male UNDER 65: How often do you have five or more drinks on one occasion? 0 Filed at: 05/11/2023 2157   3b  FEMALE Any Age, or MALE 65+: How often do you have 4 or more drinks on one occassion? 0 Filed at: 05/11/2023 2157   Audit-C Score 3 Filed at: 05/11/2023 2157   LISA: How many times in the past year have you    Used an illegal drug or used a prescription medication for non-medical reasons? Never Filed at: 05/11/2023 2157                    Medical Decision Making  77-year-old female presents emergency department for evaluation of fever and cough  On exam, she is ill-appearing, but in no acute distress  Patient meets SIRS criteria with fever and tachycardia  Will check septic work-up and treat with Toradol, Tylenol, and IV fluids  Differential diagnosis includes but is not limited to viral illness, sinusitis, pneumonia  Labs grossly unremarkable other than mild leukocytosis  Chest x-ray demonstrated left-sided infiltrate consistent with pneumonia  We will treat with Augmentin and azithromycin  On repeat evaluation, patient's vital signs improved  She is saturating well on room air without any increased work of breathing  Plan to discharge home with prescriptions for Augmentin and azithromycin  She was given strict return precautions      Acute cough: acute illness or injury  Fever: acute illness or injury  Pneumonia: acute illness or injury  Amount and/or Complexity of Data Reviewed  Labs: ordered  Radiology: ordered and independent interpretation performed  Risk  OTC drugs  Prescription drug management  Disposition  Final diagnoses:   Pneumonia   Fever   Acute cough     Time reflects when diagnosis was documented in both MDM as applicable and the Disposition within this note     Time User Action Codes Description Comment    5/11/2023 11:20 PM Check, Tate Jeronimo [J18 9] Pneumonia     5/11/2023 11:20 PM Check, Tate Garvey Add [R50 9] Fever     5/11/2023 11:20 PM Check, Tate Jeroniom [R05 1] Acute cough       ED Disposition     ED Disposition   Discharge    Condition   Stable    Date/Time   Thu May 11, 2023 11:20 PM    Comment   Marilin Morgan discharge to home/self care  Follow-up Information     Follow up With Specialties Details Why Contact Info Additional [address] Emergency Department Emergency Medicine  If symptoms worsen [address]  [place] [id number] [phone] [facility] Emergency Department, [address] [facility], [address] [place] [address]    Alem Sevilla DO Family Medicine Schedule an appointment as soon as possible for a visit   [address] [address]  [phone]             Discharge Medication List as of 5/11/2023 11:22 PM      START taking these medications    Details   amoxicillin-clavulanate (AUGMENTIN) 875-125 mg per tablet Take 1 tablet by mouth every 12 (twelve) hours for 5 days, Starting Thu 5/11/2023, Until Tue 5/16/2023, Normal      azithromycin (ZITHROMAX) 250 mg tablet Take 1 tablet (250 mg total) by mouth every 24 hours for 4 days, Starting Thu 5/11/2023, Until Mon 5/15/2023, Normal         CONTINUE these medications which have NOT CHANGED    Details   Cholecalciferol (VITAMIN D3 PO) Take 4,000 Units by mouth, Historical Med      clobetasol (TEMOVATE) 0 05 % external solution Apply topically in the morning, Starting Thu 3/24/2022, Normal      Diclofenac Sodium (VOLTAREN) 1 % Apply 2 g topically 4 (four) times a day, Starting Tue 1/31/2023, Normal      ELDERBERRY PO Take by mouth, Historical Med      fluticasone (FLONASE) 50 mcg/act nasal spray 2 sprays into each nostril daily, Starting Mon 1/31/2022, Normal      hydrOXYzine HCL (ATARAX) 50 mg tablet Take 1 tablet (50 mg total) by mouth 3 (three) times a day as needed for itching, Starting Mon 9/26/2022, Normal      lisinopril (ZESTRIL) 20 mg tablet Take 1 tablet (20 mg total) by mouth daily, Starting Tue 4/4/2023, Normal      mesalamine (APRISO) 0 375 g 24 hr capsule Starting Fri 1/6/2023, Historical Med      mesalamine (LIALDA) 1 2 g EC tablet Take 1,200 mg by mouth 4 (four) times a day, Historical Med      metoprolol succinate (TOPROL-XL) 25 mg 24 hr tablet Take 1 tablet (25 mg total) by mouth daily Please take half a tablet per day , Starting Mon 3/27/2023, Until Wed 4/26/2023, Normal      Mibelas 24 Fe 1-20 MG-MCG(24) CHEW Starting Wed 1/12/2022, Historical Med      Multiple Vitamin (multivitamin) tablet Take 1 tablet by mouth daily, Historical Med      norgestrel-ethinyl estradiol (LO/OVRAL) 0 3 mg-30 mcg per tablet Take 1 tablet by mouth daily, Historical Med      omeprazole (PriLOSEC) 20 mg delayed release capsule daily, Starting Thu 7/14/2022, Historical Med      predniSONE 20 mg tablet Take 3 tabs all at once daily for 3 days then 2 tabs for 2 days then 1 tab for 2 days  , Normal      sucralfate (CARAFATE) 1 g tablet 2 (two) times a day, Starting Thu 1/19/2023, Historical Med             No discharge procedures on file      PDMP Review     None          ED Provider  Electronically Signed by           Mulugeta Yee MD  05/12/23 [id number]

## 2023-05-12 NOTE — DISCHARGE INSTRUCTIONS
Recommend Tylenol and ibuprofen as needed for pain and fevers  Take antibiotics as prescribed    Follow-up with primary care physician    Return for any worsening symptoms including any difficulty breathing, uncontrollable vomiting, or any other concerning symptoms

## 2023-05-17 LAB
BACTERIA BLD CULT: NORMAL
BACTERIA BLD CULT: NORMAL

## 2023-05-19 ENCOUNTER — OFFICE VISIT (OUTPATIENT)
Dept: URGENT CARE | Facility: CLINIC | Age: 43
End: 2023-05-19

## 2023-05-19 ENCOUNTER — APPOINTMENT (OUTPATIENT)
Dept: RADIOLOGY | Facility: CLINIC | Age: 43
End: 2023-05-19

## 2023-05-19 VITALS
RESPIRATION RATE: 20 BRPM | TEMPERATURE: 98.1 F | OXYGEN SATURATION: 98 % | SYSTOLIC BLOOD PRESSURE: 134 MMHG | HEART RATE: 110 BPM | DIASTOLIC BLOOD PRESSURE: 87 MMHG

## 2023-05-19 DIAGNOSIS — J98.01 ACUTE BRONCHOSPASM: Primary | ICD-10-CM

## 2023-05-19 DIAGNOSIS — R05.1 ACUTE COUGH: ICD-10-CM

## 2023-05-19 DIAGNOSIS — Z87.01 HISTORY OF RECENT PNEUMONIA: ICD-10-CM

## 2023-05-19 LAB
ATRIAL RATE: 84 BPM
P AXIS: 31 DEGREES
PR INTERVAL: 130 MS
QRS AXIS: 40 DEGREES
QRSD INTERVAL: 90 MS
QT INTERVAL: 382 MS
QTC INTERVAL: 451 MS
T WAVE AXIS: 17 DEGREES
VENTRICULAR RATE: 84 BPM

## 2023-05-19 RX ORDER — ALBUTEROL SULFATE 90 UG/1
2 AEROSOL, METERED RESPIRATORY (INHALATION) EVERY 6 HOURS PRN
Qty: 8.5 G | Refills: 0 | Status: SHIPPED | OUTPATIENT
Start: 2023-05-19

## 2023-05-19 RX ORDER — PREDNISONE 10 MG/1
TABLET ORAL
Qty: 24 TABLET | Refills: 0 | Status: SHIPPED | OUTPATIENT
Start: 2023-05-19

## 2023-05-19 RX ORDER — IPRATROPIUM BROMIDE AND ALBUTEROL SULFATE 2.5; .5 MG/3ML; MG/3ML
3 SOLUTION RESPIRATORY (INHALATION) ONCE
Status: COMPLETED | OUTPATIENT
Start: 2023-05-19 | End: 2023-05-19

## 2023-05-19 RX ORDER — DEXAMETHASONE SODIUM PHOSPHATE 10 MG/ML
10 INJECTION, SOLUTION INTRAMUSCULAR; INTRAVENOUS ONCE
Status: COMPLETED | OUTPATIENT
Start: 2023-05-19 | End: 2023-05-19

## 2023-05-19 RX ORDER — ALBUTEROL SULFATE 2.5 MG/3ML
2.5 SOLUTION RESPIRATORY (INHALATION) ONCE
Status: DISCONTINUED | OUTPATIENT
Start: 2023-05-19 | End: 2023-05-19

## 2023-05-19 RX ADMIN — DEXAMETHASONE SODIUM PHOSPHATE 10 MG: 10 INJECTION, SOLUTION INTRAMUSCULAR; INTRAVENOUS at 15:36

## 2023-05-19 RX ADMIN — IPRATROPIUM BROMIDE AND ALBUTEROL SULFATE 3 ML: 2.5; .5 SOLUTION RESPIRATORY (INHALATION) at 15:37

## 2023-05-19 NOTE — PROGRESS NOTES
"  [facility]        NAME: Mone Mitchell is a 38 y o  female  : 1980    MRN: [id number]  DATE: May 19, 2023  TIME: 3:56 PM    Assessment and Plan   Acute bronchospasm [J98 01]  1  Acute bronchospasm  ipratropium-albuterol (DUO-NEB) 0 5-2 5 mg/3 mL inhalation solution 3 mL      2  History of recent pneumonia  XR chest pa & lateral    ECG 12 lead    predniSONE 10 mg tablet    albuterol (ProAir HFA) 90 mcg/act inhaler    dexamethasone (PF) (DECADRON) injection 10 mg    ipratropium-albuterol (DUO-NEB) 0 5-2 5 mg/3 mL inhalation solution 3 mL    DISCONTINUED: albuterol inhalation solution 2 5 mg    DISCONTINUED: ipratropium (ATROVENT) 0 02 % inhalation solution 0 5 mg      3  Acute cough  XR chest pa & lateral    ECG 12 lead    predniSONE 10 mg tablet    albuterol (ProAir HFA) 90 mcg/act inhaler    dexamethasone (PF) (DECADRON) injection 10 mg    ipratropium-albuterol (DUO-NEB) 0 5-2 5 mg/3 mL inhalation solution 3 mL    DISCONTINUED: albuterol inhalation solution 2 5 mg    DISCONTINUED: ipratropium (ATROVENT) 0 02 % inhalation solution 0 5 mg            Patient Instructions       Follow up with PCP in 3-5 days  Proceed to  ER if symptoms worsen  Your xray was preliminarily read by your provider  A radiologist will read the xray and you will be notified if it is abnormal     You are to take the prednisone and use the albuterol MDI as prescribed  You are to avoid cold fluids - drink room temperature fluids, warm tea with honey and lemon  Take robitussin CF - stop the mucinex  You are to follow up with your PCP next week  You are to go to the ED if symptoms worsen         Chief Complaint     Chief Complaint   Patient presents with   • Shortness of Breath     C/o increased SOB onset \"the past 2 weeks\"  Recent dx of pneumonia from ED visit, finished Zithromax and Augmentin  Denies fall/trauma  Denies anticoags           History of Present Illness       This is a 38year old female who was seen  " and diagnosed with viral URI  She was seen in the ED 5/11 and diagnosed with pneumonia  She was prescribed Augmentin and zpack  She denies that she was prescribed steroids or MDI  She has been taking mucinex w/o much relief  Some productive mucous at times  She denies fevers, chills, n/v/d  She states she is not getting worse; just not getting better  She states that she had an appointment Wed with PCP and wasn't able to make it due to work  She denies asthma or lung history  She denies pregnancy  She states she has chest tightness, sharp barky cough and pain in the back of her lungs and pain with dep breaths  Review of Systems   Review of Systems   Constitutional: Negative  HENT: Negative  Eyes: Negative  Respiratory: Positive for cough, chest tightness and shortness of breath  Cardiovascular: Negative  Gastrointestinal: Negative  Endocrine: Negative  Genitourinary: Negative  Musculoskeletal: Positive for back pain  Skin: Negative  Allergic/Immunologic: Negative  Neurological: Negative  Hematological: Negative  Psychiatric/Behavioral: Negative  Current Medications       Current Outpatient Medications:   •  albuterol (ProAir HFA) 90 mcg/act inhaler, Inhale 2 puffs every 6 (six) hours as needed for wheezing or shortness of breath, Disp: 8 5 g, Rfl: 0  •  predniSONE 10 mg tablet, Take 5 tabs po x 2 days; 4 tabs po x 2 days; 3 tabs po x 1 day; 2 tabs po x 1 day   1 tab po x 1 day , Disp: 24 tablet, Rfl: 0  •  Cholecalciferol (VITAMIN D3 PO), Take 4,000 Units by mouth, Disp: , Rfl:   •  clobetasol (TEMOVATE) 0 05 % external solution, Apply topically in the morning, Disp: 50 mL, Rfl: 0  •  Diclofenac Sodium (VOLTAREN) 1 %, Apply 2 g topically 4 (four) times a day (Patient not taking: Reported on 3/8/2023), Disp: 350 g, Rfl: 2  •  ELDERBERRY PO, Take by mouth, Disp: , Rfl:   •  fluticasone (FLONASE) 50 mcg/act nasal spray, 2 sprays into each nostril daily, Disp: 18 2 mL, Rfl: 6  •  hydrOXYzine HCL (ATARAX) 50 mg tablet, Take 1 tablet (50 mg total) by mouth 3 (three) times a day as needed for itching (Patient not taking: Reported on 1/31/2023), Disp: 30 tablet, Rfl: 0  •  lisinopril (ZESTRIL) 20 mg tablet, Take 1 tablet (20 mg total) by mouth daily, Disp: 90 tablet, Rfl: 0  •  mesalamine (APRISO) 0 375 g 24 hr capsule, , Disp: , Rfl:   •  mesalamine (LIALDA) 1 2 g EC tablet, Take 1,200 mg by mouth 4 (four) times a day (Patient not taking: Reported on 1/31/2023), Disp: , Rfl:   •  metoprolol succinate (TOPROL-XL) 25 mg 24 hr tablet, Take 1 tablet (25 mg total) by mouth daily Please take half a tablet per day , Disp: 30 tablet, Rfl: 0  •  Mibelas 24 Fe 1-20 MG-MCG(24) CHEW, , Disp: , Rfl:   •  Multiple Vitamin (multivitamin) tablet, Take 1 tablet by mouth daily, Disp: , Rfl:   •  norgestrel-ethinyl estradiol (LO/OVRAL) 0 3 mg-30 mcg per tablet, Take 1 tablet by mouth daily (Patient not taking: Reported on 1/31/2022), Disp: , Rfl:   •  omeprazole (PriLOSEC) 20 mg delayed release capsule, daily, Disp: , Rfl:   •  predniSONE 20 mg tablet, Take 3 tabs all at once daily for 3 days then 2 tabs for 2 days then 1 tab for 2 days  (Patient not taking: Reported on 1/31/2023), Disp: 15 tablet, Rfl: 0  •  sucralfate (CARAFATE) 1 g tablet, 2 (two) times a day, Disp: , Rfl:   No current facility-administered medications for this visit      Current Allergies     Allergies as of 05/19/2023   • (No Known Allergies)            The following portions of the patient's history were reviewed and updated as appropriate: allergies, current medications, past family history, past medical history, past social history, past surgical history and problem list      Past Medical History:   Diagnosis Date   • COVID-19 vaccine administered     [employer] in March 2021   • Crohn disease ([address] )    • Hypertension    • IBS (irritable bowel syndrome)    • Inflammatory bowel disease 2003    Crohns dx 11/2020   • Kidney stone 06/2021    Lithotripsy done 7/2021   • Obstruction of right ureteropelvic junction due to stone    • PONV (postoperative nausea and vomiting)     Pt requests to be pre-medicated for N/V prior to sx   • Psoriatic arthritis ([address] )    • Wears glasses        Past Surgical History:   Procedure Laterality Date   • CHOLECYSTECTOMY     • ID CYSTO BLADDER W/URETERAL CATHETERIZATION Right 6/18/2021    Procedure: CYSTOSCOPY RETROGRADE PYELOGRAM WITH INSERTION STENT URETERAL;  Surgeon: Armand Galicia MD;  Location: [address] [place] [address] MAIN OR;  Service: Urology   • ID LITHOTRIPSY [address] Right 7/16/2021    Procedure: LITHOTRIPSY EXTRACORPORAL SHOCKWAVE (ESWL); Surgeon: Armand Galicia MD;  Location:  MAIN OR;  Service: Urology   • TONSILLECTOMY         Family History   Problem Relation Age of Onset   • Diabetes Mother    • Hypertension Father    • Heart attack Father    • No Known Problems Maternal Grandmother    • No Known Problems Maternal Grandfather    • No Known Problems Paternal Grandmother    • No Known Problems Paternal Grandfather    • No Known Problems Maternal Aunt    • No Known Problems Maternal Aunt    • Breast cancer Paternal Aunt 39         Medications have been verified  Objective   /87 (BP Location: Right arm, Patient Position: Sitting)   Pulse (!) 110   Temp 98 1 °F (36 7 °C) (Temporal)   Resp 20   LMP 04/26/2023 (Approximate)   SpO2 98%   Patient's last menstrual period was 04/26/2023 (approximate)  Physical Exam     Physical Exam  Vitals and nursing note reviewed  Constitutional:       General: She is not in acute distress  Appearance: She is well-developed  She is obese  She is not ill-appearing, toxic-appearing or diaphoretic  Comments: Intermittent bronchospasm like cough with croupy sound  HENT:      Head: Normocephalic and atraumatic  Mouth/Throat:      Mouth: Mucous membranes are moist       Pharynx: Oropharynx is clear     Eyes:      Extraocular Movements: Extraocular movements intact  Cardiovascular:      Rate and Rhythm: Normal rate and regular rhythm  Pulmonary:      Effort: Pulmonary effort is normal       Breath sounds: Examination of the right-upper field reveals decreased breath sounds  Examination of the left-upper field reveals decreased breath sounds  Examination of the right-middle field reveals decreased breath sounds  Examination of the left-middle field reveals decreased breath sounds  Examination of the right-lower field reveals decreased breath sounds  Examination of the left-lower field reveals decreased breath sounds  Decreased breath sounds present  No wheezing, rhonchi or rales  Musculoskeletal:         General: Normal range of motion  Cervical back: Normal range of motion and neck supple  Skin:     General: Skin is warm and dry  Capillary Refill: Capillary refill takes less than 2 seconds  Neurological:      General: No focal deficit present  Mental Status: She is alert and oriented to person, place, and time  Psychiatric:         Mood and Affect: Mood normal          Behavior: Behavior normal          Preliminary reading CXR  Resolving left base pneumonia  Waiting on rad read    EKG SR 84 no stemi    Lungs continue to be slightly decreased after duoneb however pt states she is feeling better  Pulse ox 95- to 98%   (to be expected)        Compared to previous EKG 5/11/2023

## 2023-05-19 NOTE — PATIENT INSTRUCTIONS
Your xray was preliminarily read by your provider  A radiologist will read the xray and you will be notified if it is abnormal     You are to take the prednisone and use the albuterol MDI as prescribed  You are to avoid cold fluids - drink room temperature fluids, warm tea with honey and lemon  Take robitussin CF - stop the mucinex       You are to follow up with your PCP next week  You are to go to the ED if symptoms worsen DISCHARGE

## 2023-05-26 ENCOUNTER — TELEPHONE (OUTPATIENT)
Dept: SLEEP CENTER | Facility: CLINIC | Age: 43
End: 2023-05-26

## 2023-06-02 DIAGNOSIS — R00.0 INCREASED HEART RATE: ICD-10-CM

## 2023-06-02 DIAGNOSIS — I10 PRIMARY HYPERTENSION: ICD-10-CM

## 2023-06-02 DIAGNOSIS — J30.2 SEASONAL ALLERGIES: ICD-10-CM

## 2023-06-02 RX ORDER — METOPROLOL SUCCINATE 25 MG/1
25 TABLET, EXTENDED RELEASE ORAL DAILY
Qty: 30 TABLET | Refills: 0 | Status: SHIPPED | OUTPATIENT
Start: 2023-06-02 | End: 2023-07-02

## 2023-06-02 RX ORDER — FLUTICASONE PROPIONATE 50 MCG
SPRAY, SUSPENSION (ML) NASAL
Qty: 16 G | Refills: 0 | Status: SHIPPED | OUTPATIENT
Start: 2023-06-02

## 2023-07-13 ENCOUNTER — APPOINTMENT (OUTPATIENT)
Age: 43
End: 2023-07-13
Payer: COMMERCIAL

## 2023-07-13 DIAGNOSIS — Z13.29 SCREENING FOR ENDOCRINE, METABOLIC AND IMMUNITY DISORDER: ICD-10-CM

## 2023-07-13 DIAGNOSIS — Z13.0 SCREENING FOR ENDOCRINE, METABOLIC AND IMMUNITY DISORDER: ICD-10-CM

## 2023-07-13 DIAGNOSIS — Z13.228 SCREENING FOR ENDOCRINE, METABOLIC AND IMMUNITY DISORDER: ICD-10-CM

## 2023-07-13 LAB
25(OH)D3 SERPL-MCNC: 55.2 NG/ML (ref 30–100)
ALBUMIN SERPL BCP-MCNC: 3.5 G/DL (ref 3.5–5)
ALP SERPL-CCNC: 66 U/L (ref 46–116)
ALT SERPL W P-5'-P-CCNC: 17 U/L (ref 12–78)
ANION GAP SERPL CALCULATED.3IONS-SCNC: 8 MMOL/L
AST SERPL W P-5'-P-CCNC: 15 U/L (ref 5–45)
BASOPHILS # BLD AUTO: 0.04 THOUSANDS/ÂΜL (ref 0–0.1)
BASOPHILS NFR BLD AUTO: 1 % (ref 0–1)
BILIRUB SERPL-MCNC: 0.43 MG/DL (ref 0.2–1)
BUN SERPL-MCNC: 10 MG/DL (ref 5–25)
CALCIUM SERPL-MCNC: 8.8 MG/DL (ref 8.3–10.1)
CHLORIDE SERPL-SCNC: 109 MMOL/L (ref 96–108)
CHOLEST SERPL-MCNC: 204 MG/DL
CO2 SERPL-SCNC: 21 MMOL/L (ref 21–32)
CREAT SERPL-MCNC: 0.89 MG/DL (ref 0.6–1.3)
EOSINOPHIL # BLD AUTO: 0.12 THOUSAND/ÂΜL (ref 0–0.61)
EOSINOPHIL NFR BLD AUTO: 2 % (ref 0–6)
ERYTHROCYTE [DISTWIDTH] IN BLOOD BY AUTOMATED COUNT: 13.2 % (ref 11.6–15.1)
GFR SERPL CREATININE-BSD FRML MDRD: 79 ML/MIN/1.73SQ M
GLUCOSE P FAST SERPL-MCNC: 103 MG/DL (ref 65–99)
HCT VFR BLD AUTO: 42.7 % (ref 34.8–46.1)
HDLC SERPL-MCNC: 53 MG/DL
HGB BLD-MCNC: 13.9 G/DL (ref 11.5–15.4)
IMM GRANULOCYTES # BLD AUTO: 0.02 THOUSAND/UL (ref 0–0.2)
IMM GRANULOCYTES NFR BLD AUTO: 0 % (ref 0–2)
LDLC SERPL CALC-MCNC: 113 MG/DL (ref 0–100)
LYMPHOCYTES # BLD AUTO: 2.1 THOUSANDS/ÂΜL (ref 0.6–4.47)
LYMPHOCYTES NFR BLD AUTO: 27 % (ref 14–44)
MCH RBC QN AUTO: 27.9 PG (ref 26.8–34.3)
MCHC RBC AUTO-ENTMCNC: 32.6 G/DL (ref 31.4–37.4)
MCV RBC AUTO: 86 FL (ref 82–98)
MONOCYTES # BLD AUTO: 0.7 THOUSAND/ÂΜL (ref 0.17–1.22)
MONOCYTES NFR BLD AUTO: 9 % (ref 4–12)
NEUTROPHILS # BLD AUTO: 4.77 THOUSANDS/ÂΜL (ref 1.85–7.62)
NEUTS SEG NFR BLD AUTO: 61 % (ref 43–75)
NONHDLC SERPL-MCNC: 151 MG/DL
NRBC BLD AUTO-RTO: 0 /100 WBCS
PLATELET # BLD AUTO: 423 THOUSANDS/UL (ref 149–390)
PMV BLD AUTO: 9.9 FL (ref 8.9–12.7)
POTASSIUM SERPL-SCNC: 4.2 MMOL/L (ref 3.5–5.3)
PROT SERPL-MCNC: 7.2 G/DL (ref 6.4–8.4)
RBC # BLD AUTO: 4.98 MILLION/UL (ref 3.81–5.12)
SODIUM SERPL-SCNC: 138 MMOL/L (ref 135–147)
TRIGL SERPL-MCNC: 192 MG/DL
TSH SERPL DL<=0.05 MIU/L-ACNC: 3.26 UIU/ML (ref 0.45–4.5)
WBC # BLD AUTO: 7.75 THOUSAND/UL (ref 4.31–10.16)

## 2023-07-13 PROCEDURE — 36415 COLL VENOUS BLD VENIPUNCTURE: CPT

## 2023-07-13 PROCEDURE — 82306 VITAMIN D 25 HYDROXY: CPT

## 2023-07-13 PROCEDURE — 80053 COMPREHEN METABOLIC PANEL: CPT

## 2023-07-13 PROCEDURE — 85025 COMPLETE CBC W/AUTO DIFF WBC: CPT

## 2023-07-13 PROCEDURE — 80061 LIPID PANEL: CPT

## 2023-07-13 PROCEDURE — 84443 ASSAY THYROID STIM HORMONE: CPT

## 2023-07-17 DIAGNOSIS — I10 PRIMARY HYPERTENSION: ICD-10-CM

## 2023-07-17 RX ORDER — LISINOPRIL 20 MG/1
TABLET ORAL
Qty: 90 TABLET | Refills: 0 | Status: SHIPPED | OUTPATIENT
Start: 2023-07-17

## 2023-07-20 ENCOUNTER — NURSE TRIAGE (OUTPATIENT)
Dept: OTHER | Facility: OTHER | Age: 43
End: 2023-07-20

## 2023-07-20 ENCOUNTER — OFFICE VISIT (OUTPATIENT)
Dept: FAMILY MEDICINE CLINIC | Facility: CLINIC | Age: 43
End: 2023-07-20
Payer: COMMERCIAL

## 2023-07-20 VITALS
WEIGHT: 200 LBS | SYSTOLIC BLOOD PRESSURE: 124 MMHG | BODY MASS INDEX: 39.27 KG/M2 | HEIGHT: 60 IN | DIASTOLIC BLOOD PRESSURE: 80 MMHG | HEART RATE: 85 BPM | OXYGEN SATURATION: 98 % | TEMPERATURE: 99.3 F

## 2023-07-20 DIAGNOSIS — I10 PRIMARY HYPERTENSION: ICD-10-CM

## 2023-07-20 DIAGNOSIS — E78.00 HYPERCHOLESTEROLEMIA: ICD-10-CM

## 2023-07-20 DIAGNOSIS — Z12.31 ENCOUNTER FOR SCREENING MAMMOGRAM FOR BREAST CANCER: ICD-10-CM

## 2023-07-20 DIAGNOSIS — Z00.00 ANNUAL PHYSICAL EXAM: Primary | ICD-10-CM

## 2023-07-20 DIAGNOSIS — L40.50 PSORIATIC ARTHRITIS (HCC): ICD-10-CM

## 2023-07-20 DIAGNOSIS — K50.90 CROHN'S DISEASE WITHOUT COMPLICATION, UNSPECIFIED GASTROINTESTINAL TRACT LOCATION (HCC): ICD-10-CM

## 2023-07-20 DIAGNOSIS — E66.01 CLASS 2 SEVERE OBESITY DUE TO EXCESS CALORIES WITH SERIOUS COMORBIDITY AND BODY MASS INDEX (BMI) OF 39.0 TO 39.9 IN ADULT (HCC): ICD-10-CM

## 2023-07-20 PROBLEM — N20.1 OBSTRUCTION OF RIGHT URETEROPELVIC JUNCTION (UPJ) DUE TO STONE: Status: RESOLVED | Noted: 2021-06-18 | Resolved: 2023-07-20

## 2023-07-20 PROCEDURE — 99213 OFFICE O/P EST LOW 20 MIN: CPT | Performed by: FAMILY MEDICINE

## 2023-07-20 PROCEDURE — 99396 PREV VISIT EST AGE 40-64: CPT | Performed by: FAMILY MEDICINE

## 2023-07-20 NOTE — TELEPHONE ENCOUNTER
Regarding: WASP STING  ----- Message from [facility] sent at 7/20/2023  7:02 PM EDT -----  "I am not sure if I got stung by a wasp but it  started off red the size of a quarter and now it has spread throughout out the entire triceps and hot to touch"

## 2023-07-20 NOTE — TELEPHONE ENCOUNTER
Reason for Disposition  • [1] Red or very tender (to touch) area AND [2] getting larger over 48 hours after the sting    Answer Assessment - Initial Assessment Questions  1. TYPE: "What type of sting was it?" (bee, yellow jacket, etc.)       Bee or yellow jacket  2. ONSET: "When did it occur?"       Yesterday around 3pm  3. LOCATION: "Where is the sting located?"  "How many stings?"      Right bicep  4. SWELLING SIZE: "How big is the swelling?" (e.g., inches or cm)      Size of a half dollar originally now it spread to elbow  5. REDNESS: "Is the area red or pink?" If Yes, ask: "What size is area of redness?" (e.g., inches or cm). "When did the redness start?"      Redness along area, slightly swollen  6. PAIN: "Is there any pain?" If Yes, ask: "How bad is it?"  (Scale 1-10; or mild, moderate, severe)      denies  7. ITCHING: "Is there any itching?" If Yes, ask: "How bad is it?"       Itchy- scratch here and  There and itching goes away  8. RESPIRATORY DISTRESS: "Describe your breathing."      denies  9. PRIOR REACTIONS: "Have you had any severe allergic reactions to stings in the past?" if yes, ask: "What happened?"      Yes, last year bitten on upper leg and developed cellulitis from it.   10. OTHER SYMPTOMS: "Do you have any other symptoms?" (e.g., abdominal pain, face or tongue swelling, new rash elsewhere, vomiting)        denies    Protocols used: BEE OR YELLOW JACKET STING-ADULT-

## 2023-07-20 NOTE — PROGRESS NOTES
Assessment/Plan:    No problem-specific Assessment & Plan notes found for this encounter. Diagnoses and all orders for this visit:    Annual physical exam    Primary hypertension  Comments:  no change in the medication    Hypercholesterolemia  Comments:  pt counseled on diet and exercise    Encounter for screening mammogram for breast cancer  -     Mammo screening bilateral w 3d & cad; Future    Crohn's disease without complication, unspecified gastrointestinal tract location (HCC)    Class 2 severe obesity due to excess calories with serious comorbidity and body mass index (BMI) of 39.0 to 39.9 in adult [facility])  Comments:  pt counseled on diet and exercise    Psoriatic arthritis ([address])    Other orders  -     Risankizumab-rzaa ([address] IV); Inject into a catheter in a vein          PHQ-2/9 Depression Screening    Little interest or pleasure in doing things: 0 - not at all  Feeling down, depressed, or hopeless: 0 - not at all  PHQ-2 Score: 0  PHQ-2 Interpretation: Negative depression screen            Subjective:      Patient ID: Neris Swan is a 38 y.o. female. Pt here for annual physical      The following portions of the patient's history were reviewed and updated as appropriate: allergies, current medications, past family history, past medical history, past social history, past surgical history and problem list.    Review of Systems   Constitutional: Negative for chills, fatigue and fever. HENT: Negative. Negative for hearing loss. Eyes: Negative. Negative for visual disturbance. Respiratory: Negative for shortness of breath and wheezing. Cardiovascular: Negative for chest pain and palpitations. Gastrointestinal: Negative for abdominal pain, blood in stool, constipation, diarrhea, nausea and vomiting. Endocrine: Negative. Genitourinary: Negative for difficulty urinating and dysuria. Musculoskeletal: Negative for arthralgias and myalgias. Skin: Negative.     Allergic/Immunologic: Negative. Neurological: Negative for seizures and syncope. Hematological: Negative for adenopathy. Psychiatric/Behavioral: Negative. Objective:    /80   Pulse 85   Temp 99.3 °F (37.4 °C)   Ht 5' (1.524 m)   Wt 90.7 kg (200 lb)   SpO2 98%   BMI 39.06 kg/m²      Physical Exam  Vitals and nursing note reviewed. Constitutional:       General: She is not in acute distress. Appearance: Normal appearance. She is well-developed. She is not ill-appearing, toxic-appearing or diaphoretic. HENT:      Head: Normocephalic and atraumatic. Right Ear: Tympanic membrane, ear canal and external ear normal. There is no impacted cerumen. Left Ear: Tympanic membrane, ear canal and external ear normal. There is no impacted cerumen. Nose: Nose normal. No congestion or rhinorrhea. Mouth/Throat:      Mouth: Mucous membranes are moist.      Pharynx: Oropharynx is clear. No oropharyngeal exudate or posterior oropharyngeal erythema. Eyes:      General: No scleral icterus. Right eye: No discharge. Left eye: No discharge. Conjunctiva/sclera: Conjunctivae normal.      Pupils: Pupils are equal, round, and reactive to light. Cardiovascular:      Rate and Rhythm: Normal rate and regular rhythm. Pulses: Normal pulses. Heart sounds: Normal heart sounds. No murmur heard. Pulmonary:      Effort: Pulmonary effort is normal. No respiratory distress. Breath sounds: Normal breath sounds. No wheezing, rhonchi or rales. Abdominal:      General: Bowel sounds are normal. There is no distension. Palpations: Abdomen is soft. There is no mass. Tenderness: There is no abdominal tenderness. There is no guarding or rebound. Musculoskeletal:         General: Normal range of motion. Cervical back: Normal range of motion and neck supple. No rigidity. Right lower leg: No edema. Left lower leg: No edema.    Lymphadenopathy:      Cervical: No cervical adenopathy. Skin:     General: Skin is warm and dry. Findings: No rash. Neurological:      General: No focal deficit present. Mental Status: She is alert and oriented to person, place, and time. Sensory: No sensory deficit. Motor: No weakness or abnormal muscle tone. Coordination: Coordination normal.      Gait: Gait normal.      Deep Tendon Reflexes: Reflexes are normal and symmetric. Psychiatric:         Mood and Affect: Mood normal.         Behavior: Behavior normal.         Thought Content: Thought content normal.         Judgment: Judgment normal.       BMI Counseling: Body mass index is 39.06 kg/m². The BMI is above normal. Nutrition recommendations include reducing portion sizes and 3-5 servings of fruits/vegetables daily. Exercise recommendations include moderate aerobic physical activity for 150 minutes/week and exercising 3-5 times per week.

## 2023-07-21 ENCOUNTER — OFFICE VISIT (OUTPATIENT)
Dept: FAMILY MEDICINE CLINIC | Facility: CLINIC | Age: 43
End: 2023-07-21
Payer: COMMERCIAL

## 2023-07-21 VITALS
SYSTOLIC BLOOD PRESSURE: 114 MMHG | BODY MASS INDEX: 38.87 KG/M2 | TEMPERATURE: 98.9 F | OXYGEN SATURATION: 96 % | WEIGHT: 198 LBS | HEIGHT: 60 IN | DIASTOLIC BLOOD PRESSURE: 82 MMHG | HEART RATE: 86 BPM

## 2023-07-21 DIAGNOSIS — L03.113 CELLULITIS OF RIGHT UPPER EXTREMITY: Primary | ICD-10-CM

## 2023-07-21 PROCEDURE — 99213 OFFICE O/P EST LOW 20 MIN: CPT | Performed by: FAMILY MEDICINE

## 2023-07-21 RX ORDER — CEPHALEXIN 500 MG/1
500 CAPSULE ORAL EVERY 8 HOURS SCHEDULED
Qty: 21 CAPSULE | Refills: 0 | Status: SHIPPED | OUTPATIENT
Start: 2023-07-21 | End: 2023-07-28

## 2023-07-21 NOTE — PROGRESS NOTES
Assessment/Plan:    No problem-specific Assessment & Plan notes found for this encounter. Diagnoses and all orders for this visit:    Cellulitis of right upper extremity  -     cephalexin (KEFLEX) 500 mg capsule; Take 1 capsule (500 mg total) by mouth every 8 (eight) hours for 7 days          PHQ-2/9 Depression Screening              Subjective:      Patient ID: Russel Seals is a 38 y.o. female. Rash  This is a new problem. The current episode started in the past 7 days. Location: right upper arm. The problem is moderate. The rash is characterized by burning. Associated with: bee sting. The rash first occurred outside. Pertinent negatives include no fever or shortness of breath. The following portions of the patient's history were reviewed and updated as appropriate: allergies, current medications, past family history, past medical history, past social history, past surgical history and problem list.    Review of Systems   Constitutional: Negative for chills and fever. Respiratory: Negative for shortness of breath and wheezing. Cardiovascular: Negative for chest pain and palpitations. Skin: Positive for rash. Objective:    /82   Pulse 86   Temp 98.9 °F (37.2 °C)   Ht 5' (1.524 m)   Wt 89.8 kg (198 lb)   SpO2 96%   BMI 38.67 kg/m²      Physical Exam  Vitals and nursing note reviewed. Constitutional:       General: She is not in acute distress. Appearance: Normal appearance. She is not ill-appearing, toxic-appearing or diaphoretic. HENT:      Head: Normocephalic and atraumatic. Eyes:      Conjunctiva/sclera: Conjunctivae normal.   Cardiovascular:      Rate and Rhythm: Normal rate and regular rhythm. Heart sounds: Normal heart sounds. No murmur heard. Pulmonary:      Effort: Pulmonary effort is normal. No respiratory distress. Breath sounds: Normal breath sounds. No wheezing, rhonchi or rales. Abdominal:      General: There is no distension. Palpations: Abdomen is soft. There is no mass. Tenderness: There is no abdominal tenderness. There is no guarding or rebound. Musculoskeletal:      Cervical back: Normal range of motion and neck supple. No rigidity. Right lower leg: No edema. Left lower leg: No edema. Lymphadenopathy:      Cervical: No cervical adenopathy. Skin:     Findings: Rash present. Neurological:      Mental Status: She is alert and oriented to person, place, and time. Psychiatric:         Mood and Affect: Mood normal.         Behavior: Behavior normal.         Thought Content:  Thought content normal.         Judgment: Judgment normal.

## 2023-08-04 DIAGNOSIS — R00.0 INCREASED HEART RATE: ICD-10-CM

## 2023-08-04 DIAGNOSIS — I10 PRIMARY HYPERTENSION: ICD-10-CM

## 2023-08-04 RX ORDER — METOPROLOL SUCCINATE 25 MG/1
25 TABLET, EXTENDED RELEASE ORAL DAILY
Qty: 30 TABLET | Refills: 0 | Status: SHIPPED | OUTPATIENT
Start: 2023-08-04 | End: 2023-09-03

## 2023-09-22 ENCOUNTER — HOSPITAL ENCOUNTER (OUTPATIENT)
Dept: MAMMOGRAPHY | Facility: HOSPITAL | Age: 43
End: 2023-09-22
Payer: COMMERCIAL

## 2023-09-22 VITALS — WEIGHT: 198 LBS | HEIGHT: 60 IN | BODY MASS INDEX: 38.87 KG/M2

## 2023-09-22 DIAGNOSIS — Z12.31 ENCOUNTER FOR SCREENING MAMMOGRAM FOR BREAST CANCER: ICD-10-CM

## 2023-09-22 PROCEDURE — 77063 BREAST TOMOSYNTHESIS BI: CPT

## 2023-09-22 PROCEDURE — 77067 SCR MAMMO BI INCL CAD: CPT

## 2023-10-03 ENCOUNTER — OFFICE VISIT (OUTPATIENT)
Dept: FAMILY MEDICINE CLINIC | Facility: CLINIC | Age: 43
End: 2023-10-03
Payer: COMMERCIAL

## 2023-10-03 VITALS
OXYGEN SATURATION: 98 % | DIASTOLIC BLOOD PRESSURE: 86 MMHG | SYSTOLIC BLOOD PRESSURE: 126 MMHG | BODY MASS INDEX: 39.73 KG/M2 | HEART RATE: 103 BPM | TEMPERATURE: 98.4 F | WEIGHT: 202.38 LBS | HEIGHT: 60 IN

## 2023-10-03 DIAGNOSIS — G44.209 ACUTE NON INTRACTABLE TENSION-TYPE HEADACHE: Primary | ICD-10-CM

## 2023-10-03 DIAGNOSIS — I10 PRIMARY HYPERTENSION: ICD-10-CM

## 2023-10-03 DIAGNOSIS — R00.0 INCREASED HEART RATE: ICD-10-CM

## 2023-10-03 PROCEDURE — 99213 OFFICE O/P EST LOW 20 MIN: CPT | Performed by: FAMILY MEDICINE

## 2023-10-03 RX ORDER — METHYLPREDNISOLONE 4 MG/1
TABLET ORAL
Qty: 21 EACH | Refills: 0 | Status: SHIPPED | OUTPATIENT
Start: 2023-10-03

## 2023-10-03 RX ORDER — LISINOPRIL 20 MG/1
20 TABLET ORAL DAILY
Qty: 90 TABLET | Refills: 0 | Status: SHIPPED | OUTPATIENT
Start: 2023-10-03

## 2023-10-03 RX ORDER — METOPROLOL SUCCINATE 25 MG/1
25 TABLET, EXTENDED RELEASE ORAL DAILY
Qty: 30 TABLET | Refills: 0 | Status: SHIPPED | OUTPATIENT
Start: 2023-10-03 | End: 2023-11-02

## 2023-10-03 NOTE — PROGRESS NOTES
Assessment/Plan     Diagnoses and all orders for this visit:    Acute non intractable tension-type headache  Comments:  -Based on the history, appears to be tension type headache  -Resolves with over-the-counter Excedrin  -Patient reports that she does not tolerate NSAIDs  -We will prescribe oral steroids   -Do not suspect that this has any association with blood pressure, but given history, we advised patient to keep a BP log  -Review BP log in the next visit  -RTC in 1 week if no improvement in headache    Orders:  -     methylPREDNISolone 4 MG tablet therapy pack; Use as directed on package        Subjective     Patient Id: Damon Gold is a 38 y.o. female    Patient is a 61-year-old female who presents to the office today with complaints of headaches since last few weeks. Patient reports that she has been experiencing on and off headaches since few weeks but more frequently the last few days. Patient states that every time she has a headache her blood pressure is noted to be in the high 150/90's. Patient locates the pain on the top of her forehead, bilaterally symmetric. She grades the pain 7/10 with no radiation. She denies any associated numbness, tingling, blurry vision, vomiting, abdominal pain, loss of consciousness. She reports to some nausea associated with a headache. Patient reports that she got her eyes checked recently and has been wearing appropriate glasses. Patient denies any recent stressors. Patient denies any URI symptoms, runny nose, redness in her eyes, sneezing, sinus pressures. Patient states that she experiences these headaches more in the afternoons. Patient reports that her symptoms resolve with Excedrin. Headache      Review of Systems   Constitutional: Negative for chills and fever. HENT: Negative for ear pain and sore throat. Eyes: Negative for pain and visual disturbance. Respiratory: Negative for cough and shortness of breath.     Cardiovascular: Negative for chest pain and palpitations. Gastrointestinal: Negative for abdominal pain and vomiting. Genitourinary: Negative for dysuria and hematuria. Musculoskeletal: Negative for arthralgias and back pain. Skin: Negative for color change and rash. Neurological: Positive for headaches. Negative for seizures and syncope. All other systems reviewed and are negative. Past Medical History:   Diagnosis Date   • COVID-19 vaccine administered     [employer] in March 2021   • Crohn disease ([address])    • Hypertension    • IBS (irritable bowel syndrome)    • Inflammatory bowel disease 2003    Crohns dx 11/2020   • Kidney stone 06/2021    Lithotripsy done 7/2021   • Obstruction of right ureteropelvic junction (UPJ) due to stone 6/18/2021   • Obstruction of right ureteropelvic junction due to stone    • PONV (postoperative nausea and vomiting)     Pt requests to be pre-medicated for N/V prior to sx   • Psoriatic arthritis ([address])    • Wears glasses        Past Surgical History:   Procedure Laterality Date   • CHOLECYSTECTOMY     • [place] CYSTO BLADDER W/URETERAL CATHETERIZATION Right 6/18/2021    Procedure: CYSTOSCOPY RETROGRADE PYELOGRAM WITH INSERTION STENT URETERAL;  Surgeon: Сергей Stone MD;  Location: [facility] MAIN OR;  Service: Urology   • IL LITHOTRIPSY [address] Right 7/16/2021    Procedure: LITHOTRIPSY EXTRACORPORAL SHOCKWAVE (ESWL);   Surgeon: Сергей Stone MD;  Location: [facility] MAIN OR;  Service: Urology   • TONSILLECTOMY         Family History   Problem Relation Age of Onset   • Diabetes Mother    • Hypertension Father    • Heart attack Father    • No Known Problems Maternal Grandmother    • No Known Problems Maternal Grandfather    • No Known Problems Paternal Grandmother    • No Known Problems Paternal Grandfather    • No Known Problems Maternal Aunt    • No Known Problems Maternal Aunt    • Breast cancer Paternal Aunt        Social History     Socioeconomic History   • Marital status: /Civil Union     Spouse name: None   • Number of children: None   • Years of education: None   • Highest education level: None   Occupational History   • None   Tobacco Use   • Smoking status: Never   • Smokeless tobacco: Never   Vaping Use   • Vaping Use: Never used   Substance and Sexual Activity   • Alcohol use: Yes     Comment: Social drinker   • Drug use: Never   • Sexual activity: Yes     Partners: Male     Birth control/protection: OCP   Other Topics Concern   • None   Social History Narrative   • None     Social Determinants of Health     Financial Resource Strain: Not on file   Food Insecurity: Not on file   Transportation Needs: Not on file   Physical Activity: Not on file   Stress: Not on file   Social Connections: Not on file   Intimate Partner Violence: Not on file   Housing Stability: Not on file       No Known Allergies      Current Outpatient Medications:   •  albuterol (ProAir HFA) 90 mcg/act inhaler, Inhale 2 puffs every 6 (six) hours as needed for wheezing or shortness of breath, Disp: 8.5 g, Rfl: 0  •  Cholecalciferol (VITAMIN D3 PO), Take 4,000 Units by mouth, Disp: , Rfl:   •  clobetasol (TEMOVATE) 0.05 % external solution, Apply topically in the morning, Disp: 50 mL, Rfl: 0  •  ELDERBERRY PO, Take by mouth, Disp: , Rfl:   •  fluticasone (FLONASE) 50 mcg/act nasal spray, TWO SPRAYS IN EACH NOSTRIL ONCE DAILY AS DIRECTED. .., Disp: 16 g, Rfl: 0  •  mesalamine (APRISO) 0.375 g 24 hr capsule, , Disp: , Rfl:   •  methylPREDNISolone 4 MG tablet therapy pack, Use as directed on package, Disp: 21 each, Rfl: 0  •  Mibelas 24 Fe 1-20 MG-MCG(24) CHEW, , Disp: , Rfl:   •  Multiple Vitamin (multivitamin) tablet, Take 1 tablet by mouth daily, Disp: , Rfl:   •  omeprazole (PriLOSEC) 20 mg delayed release capsule, daily, Disp: , Rfl:   •  Risankizumab-rzaa (SKYRIZI IV), Inject into a catheter in a vein, Disp: , Rfl:   •  Diclofenac Sodium (VOLTAREN) 1 %, Apply 2 g topically 4 (four) times a day (Patient not taking: Reported on 3/8/2023), Disp: 350 g, Rfl: 2  •  lisinopril (ZESTRIL) 20 mg tablet, Take 1 tablet (20 mg total) by mouth daily, Disp: 90 tablet, Rfl: 0  •  metoprolol succinate (TOPROL-XL) 25 mg 24 hr tablet, Take 1 tablet (25 mg total) by mouth daily Please take half a tablet per day., Disp: 30 tablet, Rfl: 0  •  norgestrel-ethinyl estradiol (LO/OVRAL) 0.3 mg-30 mcg per tablet, Take 1 tablet by mouth daily (Patient not taking: Reported on 1/31/2022), Disp: , Rfl:   •  sucralfate (CARAFATE) 1 g tablet, 2 (two) times a day (Patient not taking: Reported on 7/20/2023), Disp: , Rfl:     Objective     Vitals:    10/03/23 1503   BP: 126/86   Pulse: 103   Temp: 98.4 °F (36.9 °C)   SpO2: 98%   Weight: 91.8 kg (202 lb 6 oz)   Height: 5' (1.524 m)       Physical Exam  Vitals and nursing note reviewed. Constitutional:       General: She is not in acute distress. Appearance: She is well-developed. HENT:      Head: Normocephalic and atraumatic. Eyes:      Conjunctiva/sclera: Conjunctivae normal.   Cardiovascular:      Rate and Rhythm: Normal rate and regular rhythm. Heart sounds: No murmur heard. Pulmonary:      Effort: Pulmonary effort is normal. No respiratory distress. Breath sounds: Normal breath sounds. Abdominal:      Palpations: Abdomen is soft. Tenderness: There is no abdominal tenderness. Musculoskeletal:         General: No swelling. Cervical back: Neck supple. Skin:     General: Skin is warm and dry. Capillary Refill: Capillary refill takes less than 2 seconds. Neurological:      General: No focal deficit present. Mental Status: She is alert and oriented to person, place, and time. Cranial Nerves: No cranial nerve deficit. Sensory: No sensory deficit. Motor: No weakness.       Gait: Gait normal.   Psychiatric:         Mood and Affect: Mood normal.         Lem Felty, MD  [address] [place] [address]

## 2023-10-17 ENCOUNTER — TELEPHONE (OUTPATIENT)
Dept: FAMILY MEDICINE CLINIC | Facility: CLINIC | Age: 43
End: 2023-10-17

## 2023-10-17 NOTE — TELEPHONE ENCOUNTER
Patient called wanting to know if she should up her dose of metoprolol due to her blood pressures ranging high. She states her top blood pressure number ranges from 130-170 and bottom ranges from 95 and up. She wants to know if she should take an extra half a tab of metoprolol to see if it will bring it down? This is giving her an anxious feeling. Please advise, she requested this message go to Dr. Nicholas Faustin since Dr. Thuy Cheek is out of the office.

## 2023-10-18 ENCOUNTER — OFFICE VISIT (OUTPATIENT)
Dept: FAMILY MEDICINE CLINIC | Facility: CLINIC | Age: 43
End: 2023-10-18

## 2023-10-18 VITALS
HEIGHT: 60 IN | BODY MASS INDEX: 39.5 KG/M2 | DIASTOLIC BLOOD PRESSURE: 90 MMHG | HEART RATE: 106 BPM | SYSTOLIC BLOOD PRESSURE: 138 MMHG | OXYGEN SATURATION: 98 % | TEMPERATURE: 99.5 F | WEIGHT: 201.2 LBS

## 2023-10-18 DIAGNOSIS — R51.9 ACUTE NONINTRACTABLE HEADACHE, UNSPECIFIED HEADACHE TYPE: ICD-10-CM

## 2023-10-18 DIAGNOSIS — R00.0 INCREASED HEART RATE: ICD-10-CM

## 2023-10-18 DIAGNOSIS — I10 PRIMARY HYPERTENSION: Primary | ICD-10-CM

## 2023-10-18 RX ORDER — LISINOPRIL 40 MG/1
40 TABLET ORAL DAILY
Qty: 30 TABLET | Refills: 2 | Status: SHIPPED | OUTPATIENT
Start: 2023-10-18 | End: 2024-01-16

## 2023-10-18 NOTE — PROGRESS NOTES
Assessment/Plan:    No problem-specific Assessment & Plan notes found for this encounter. Diagnoses and all orders for this visit:    Primary hypertension  Comments:  BP not controlled increase lisinopril from 20-40. Continue metoprolol 12.5  8 weeks follow-up with BP log  Orders:  -     lisinopril (ZESTRIL) 40 mg tablet; Take 1 tablet (40 mg total) by mouth daily    Increased heart rate  Comments:  1 Small coffee per day. No energy drinks TSH is normal  EKG normal.  Follow-up Holter monitor  Orders:  -     Holter monitor; Future    Acute nonintractable headache, unspecified headache type  Comments:  improved after steroids. HTN is not controlled most likely contributing to headaches  Cannot take NSAIDs secondary to chronic disease  tylenol PRN          Blood pressure is not controlled today. She brought BP logs which is around 140s over 90s. At this time I will increase her lisinopril from 20 mg to 40 mg. She is taking also metoprolol 12.5 mg daily. Follow-up in 8 weeks    Subjective:      Patient ID: Irving Blunt is a 38 y.o. female. HPI    She is here to follow-up regarding headache. Patient reports that she is taking Excedrin over-the-counter with some improvement. She has been given steroids for headache which improved her headaches. Today in the clinic her blood pressure is not controlled. I will increase her lisinopril from 20 mg to 40 mg. She is also on metoprolol 12.5 mg. Patient reports that occasionally she does have palpitations today in the clinic her heart rate is elevated 106. I will do a Holter monitor to rule out arrhythmias. The following portions of the patient's history were reviewed and updated as appropriate: allergies, current medications, past family history, past medical history, past social history, past surgical history, and problem list.    Review of Systems   Constitutional:  Negative for chills and fever. HENT:  Negative for ear pain and sore throat. Eyes:  Negative for pain and visual disturbance. Respiratory:  Negative for cough and shortness of breath. Cardiovascular:  Positive for palpitations. Negative for chest pain. Gastrointestinal:  Negative for abdominal pain and vomiting. Genitourinary:  Negative for dysuria and hematuria. Musculoskeletal:  Negative for arthralgias and back pain. Skin:  Negative for color change and rash. Neurological:  Negative for seizures and syncope. All other systems reviewed and are negative. Objective:      /90 (BP Location: Left arm, Patient Position: Sitting)   Pulse (!) 106   Temp 99.5 °F (37.5 °C) (Tympanic)   Ht 5' (1.524 m)   Wt 91.3 kg (201 lb 3.2 oz)   SpO2 98%   BMI 39.29 kg/m²          Physical Exam  Vitals reviewed. Constitutional:       General: She is not in acute distress. Appearance: Normal appearance. She is obese. She is not toxic-appearing. HENT:      Head: Normocephalic and atraumatic. Eyes:      Extraocular Movements: Extraocular movements intact. Conjunctiva/sclera: Conjunctivae normal.   Cardiovascular:      Rate and Rhythm: Normal rate and regular rhythm. Pulses: Normal pulses. Pulmonary:      Effort: Pulmonary effort is normal.      Breath sounds: Normal breath sounds. Abdominal:      General: Abdomen is flat. Bowel sounds are normal.      Palpations: Abdomen is soft. Musculoskeletal:      Cervical back: Normal range of motion. Right lower leg: No edema. Left lower leg: No edema. Skin:     General: Skin is warm. Capillary Refill: Capillary refill takes less than 2 seconds. Neurological:      General: No focal deficit present. Mental Status: She is alert and oriented to person, place, and time. Psychiatric:         Mood and Affect: Mood normal.         Thought Content:  Thought content normal.         Judgment: Judgment normal.

## 2023-10-27 ENCOUNTER — HOSPITAL ENCOUNTER (OUTPATIENT)
Dept: NON INVASIVE DIAGNOSTICS | Facility: CLINIC | Age: 43
Discharge: HOME | End: 2023-10-27
Payer: COMMERCIAL

## 2023-10-27 DIAGNOSIS — R00.0 INCREASED HEART RATE: ICD-10-CM

## 2023-10-27 PROCEDURE — 93225 XTRNL ECG REC<48 HRS REC: CPT

## 2023-10-27 PROCEDURE — 93226 XTRNL ECG REC<48 HR SCAN A/R: CPT

## 2023-11-02 ENCOUNTER — APPOINTMENT (OUTPATIENT)
Dept: RADIOLOGY | Facility: CLINIC | Age: 43
End: 2023-11-02
Payer: COMMERCIAL

## 2023-11-02 DIAGNOSIS — N20.1 URETERAL CALCULUS: ICD-10-CM

## 2023-11-02 PROCEDURE — 74018 RADEX ABDOMEN 1 VIEW: CPT

## 2023-11-10 ENCOUNTER — HOSPITAL ENCOUNTER (OUTPATIENT)
Dept: ULTRASOUND IMAGING | Facility: HOSPITAL | Age: 43
End: 2023-11-10
Attending: UROLOGY
Payer: COMMERCIAL

## 2023-11-10 DIAGNOSIS — N20.0 CALCULUS OF KIDNEY: ICD-10-CM

## 2023-11-10 PROCEDURE — 76775 US EXAM ABDO BACK WALL LIM: CPT

## 2023-12-07 DIAGNOSIS — I10 PRIMARY HYPERTENSION: ICD-10-CM

## 2023-12-07 DIAGNOSIS — R00.0 INCREASED HEART RATE: ICD-10-CM

## 2023-12-07 RX ORDER — METOPROLOL SUCCINATE 25 MG/1
25 TABLET, EXTENDED RELEASE ORAL DAILY
Qty: 30 TABLET | Refills: 2 | Status: SHIPPED | OUTPATIENT
Start: 2023-12-07 | End: 2024-01-06

## 2023-12-18 ENCOUNTER — OFFICE VISIT (OUTPATIENT)
Dept: FAMILY MEDICINE CLINIC | Facility: CLINIC | Age: 43
End: 2023-12-18
Payer: COMMERCIAL

## 2023-12-18 VITALS
HEIGHT: 60 IN | WEIGHT: 200 LBS | TEMPERATURE: 99 F | OXYGEN SATURATION: 96 % | BODY MASS INDEX: 39.27 KG/M2 | HEART RATE: 106 BPM | SYSTOLIC BLOOD PRESSURE: 142 MMHG | DIASTOLIC BLOOD PRESSURE: 86 MMHG

## 2023-12-18 DIAGNOSIS — Z23 ENCOUNTER FOR IMMUNIZATION: ICD-10-CM

## 2023-12-18 DIAGNOSIS — R00.0 INCREASED HEART RATE: ICD-10-CM

## 2023-12-18 DIAGNOSIS — I10 PRIMARY HYPERTENSION: Primary | ICD-10-CM

## 2023-12-18 DIAGNOSIS — G44.209 ACUTE NON INTRACTABLE TENSION-TYPE HEADACHE: ICD-10-CM

## 2023-12-18 PROCEDURE — 99213 OFFICE O/P EST LOW 20 MIN: CPT

## 2023-12-18 NOTE — PROGRESS NOTES
Name: Marilin Morgan      : 1980      MRN: [id number]  Encounter Provider: IDANE Dubois  Encounter Date: 2023   Encounter department: [facility]    Assessment & Plan     1. Primary hypertension  Comments:  142/86 in office today. Pt states she took her medicine approx 1 hr ago. Continue current regimen.    2. Acute non intractable tension-type headache  Comments:  Resolved.    3. Increased heart rate  Comments:  Holter results reviewed with patient.    4. Encounter for immunization  -     influenza vaccine, quadrivalent, 0.5 mL, preservative-free, for adult and pediatric patients 6 mos+ (AFLURIA, FLUARIX, FLULAVAL, FLUZONE)    Patient's BP log showed average of 130s-140s/80s after increasing Lisinopril dose.  Discussed option of increasing BB but patient declines at this time.  Recommended continued lifestyle modifications including DASH diet & moderate cardiovascular activity.  F/u in 3 months. Instructed patient to call sooner if BP log or HR rises and trends this way.    Depression Screening and Follow-up Plan: Patient was screened for depression during today's encounter. They screened negative with a PHQ-2 score of 0.      Subjective      Marilin presents in office today for f/u on HR, BP and headaches. She was last seen on 10/18/23 where holter was ordered and Lisinopril dosing was increased. Marilin has provided a log of her BPs today that show an average reading of 130s/140s/80s with higher numbers in 150s/90s in the evening. She reports taking her BP medicine in the morning and checks it with the same cuff. She states her headaches have improved since increasing the dose of Lisinopril. +caffeine use. No new stressors.       Review of Systems   Constitutional:  Negative for chills, fatigue and fever.   HENT:  Negative for congestion, ear pain, facial swelling, hearing loss, rhinorrhea, sinus pressure, sneezing, sore throat and trouble swallowing.    Eyes:   Negative for pain, redness and visual disturbance.   Respiratory:  Negative for cough, chest tightness, shortness of breath and wheezing.    Cardiovascular:  Negative for chest pain and palpitations.   Gastrointestinal:  Negative for abdominal pain, diarrhea, nausea and vomiting.   Genitourinary:  Negative for dysuria, flank pain, hematuria and pelvic pain.   Musculoskeletal:  Negative for arthralgias, back pain and myalgias.   Skin:  Negative for color change and rash.   Neurological:  Negative for dizziness, seizures, syncope, weakness, light-headedness and headaches.   Psychiatric/Behavioral:  Negative for confusion, hallucinations and sleep disturbance. The patient is not nervous/anxious.    All other systems reviewed and are negative.      Current Outpatient Medications on File Prior to Visit   Medication Sig    Cholecalciferol (VITAMIN D3 PO) Take 4,000 Units by mouth    clobetasol (TEMOVATE) 0.05 % external solution Apply topically in the morning    ELDERBERRY PO Take by mouth    fluticasone (FLONASE) 50 mcg/act nasal spray TWO SPRAYS IN EACH NOSTRIL ONCE DAILY AS DIRECTED...    lisinopril (ZESTRIL) 40 mg tablet Take 1 tablet (40 mg total) by mouth daily    mesalamine (APRISO) 0.375 g 24 hr capsule     metoprolol succinate (TOPROL-XL) 25 mg 24 hr tablet Take 1 tablet (25 mg total) by mouth daily Please take half a tablet per day.    Mibelas 24 Fe 1-20 MG-MCG(24) CHEW     Multiple Vitamin (multivitamin) tablet Take 1 tablet by mouth daily    omeprazole (PriLOSEC) 20 mg delayed release capsule daily    Risankizumab-rzaa (SKYRIZI IV) Inject as directed    sucralfate (CARAFATE) 1 g tablet 2 (two) times a day    [DISCONTINUED] albuterol (ProAir HFA) 90 mcg/act inhaler Inhale 2 puffs every 6 (six) hours as needed for wheezing or shortness of breath (Patient not taking: Reported on 10/18/2023)    [DISCONTINUED] Diclofenac Sodium (VOLTAREN) 1 % Apply 2 g topically 4 (four) times a day (Patient not taking: Reported on  3/8/2023)    [DISCONTINUED] methylPREDNISolone 4 MG tablet therapy pack Use as directed on package (Patient not taking: Reported on 10/18/2023)    [DISCONTINUED] norgestrel-ethinyl estradiol (LO/OVRAL) 0.3 mg-30 mcg per tablet Take 1 tablet by mouth daily (Patient not taking: Reported on 1/31/2022)       Objective     /86   Pulse (!) 106   Temp 99 °F (37.2 °C)   Ht 5' (1.524 m)   Wt 90.7 kg (200 lb)   SpO2 96%   BMI 39.06 kg/m²     Physical Exam  Vitals reviewed.   Constitutional:       General: She is awake. She is not in acute distress.     Appearance: Normal appearance. She is well-developed. She is not toxic-appearing.   HENT:      Head: Normocephalic.      Jaw: There is normal jaw occlusion.      Right Ear: Tympanic membrane normal. Tympanic membrane is not erythematous or bulging.      Left Ear: Tympanic membrane normal. Tympanic membrane is not erythematous or bulging.      Nose: No congestion or rhinorrhea.      Right Sinus: No maxillary sinus tenderness or frontal sinus tenderness.      Left Sinus: No maxillary sinus tenderness or frontal sinus tenderness.      Mouth/Throat:      Pharynx: Uvula midline. No oropharyngeal exudate, posterior oropharyngeal erythema or uvula swelling.      Tonsils: No tonsillar exudate or tonsillar abscesses.   Eyes:      Extraocular Movements: Extraocular movements intact.      Conjunctiva/sclera: Conjunctivae normal.      Pupils: Pupils are equal, round, and reactive to light.   Neck:      Thyroid: No thyroid mass.      Vascular: No JVD.      Trachea: Trachea and phonation normal.   Cardiovascular:      Rate and Rhythm: Regular rhythm. Tachycardia present.      Pulses: Normal pulses.      Heart sounds: Normal heart sounds.   Pulmonary:      Effort: Pulmonary effort is normal. No tachypnea or respiratory distress.      Breath sounds: Normal breath sounds and air entry. No decreased breath sounds, wheezing, rhonchi or rales.   Chest:      Chest wall: No tenderness.    Abdominal:      General: Bowel sounds are normal. There is no distension.      Palpations: Abdomen is soft.      Tenderness: There is no abdominal tenderness. There is no right CVA tenderness, left CVA tenderness, guarding or rebound.   Musculoskeletal:         General: Normal range of motion.      Cervical back: Normal range of motion.      Right lower leg: No edema.      Left lower leg: No edema.   Lymphadenopathy:      Cervical: No cervical adenopathy.   Skin:     General: Skin is warm and dry.      Findings: No rash.   Neurological:      General: No focal deficit present.      Mental Status: She is alert and oriented to person, place, and time.      Sensory: Sensation is intact.      Motor: Motor function is intact.      Coordination: Coordination is intact.      Gait: Gait is intact.      Deep Tendon Reflexes: Reflexes are normal and symmetric.   Psychiatric:         Attention and Perception: Attention normal.         Mood and Affect: Mood normal.         Speech: Speech normal.         Behavior: Behavior normal. Behavior is cooperative.         Cognition and Memory: Cognition normal.       DIANE Dubois

## 2023-12-20 DIAGNOSIS — J30.2 SEASONAL ALLERGIES: ICD-10-CM

## 2023-12-20 RX ORDER — FLUTICASONE PROPIONATE 50 MCG
SPRAY, SUSPENSION (ML) NASAL
Qty: 16 G | Refills: 0 | Status: SHIPPED | OUTPATIENT
Start: 2023-12-20

## 2023-12-29 ENCOUNTER — APPOINTMENT (OUTPATIENT)
Age: 43
End: 2023-12-29
Payer: COMMERCIAL

## 2023-12-29 DIAGNOSIS — L40.50 PSORIATIC ARTHROPATHY (HCC): ICD-10-CM

## 2023-12-29 LAB
ALBUMIN SERPL BCP-MCNC: 4.4 G/DL (ref 3.5–5)
ALP SERPL-CCNC: 67 U/L (ref 34–104)
ALT SERPL W P-5'-P-CCNC: 12 U/L (ref 7–52)
ANION GAP SERPL CALCULATED.3IONS-SCNC: 8 MMOL/L
AST SERPL W P-5'-P-CCNC: 17 U/L (ref 13–39)
BASOPHILS # BLD AUTO: 0.04 THOUSANDS/ÂΜL (ref 0–0.1)
BASOPHILS NFR BLD AUTO: 1 % (ref 0–1)
BILIRUB SERPL-MCNC: 0.37 MG/DL (ref 0.2–1)
BUN SERPL-MCNC: 16 MG/DL (ref 5–25)
CALCIUM SERPL-MCNC: 11 MG/DL (ref 8.4–10.2)
CHLORIDE SERPL-SCNC: 101 MMOL/L (ref 96–108)
CO2 SERPL-SCNC: 29 MMOL/L (ref 21–32)
CREAT SERPL-MCNC: 0.82 MG/DL (ref 0.6–1.3)
CRP SERPL QL: 9.5 MG/L
EOSINOPHIL # BLD AUTO: 0.06 THOUSAND/ÂΜL (ref 0–0.61)
EOSINOPHIL NFR BLD AUTO: 1 % (ref 0–6)
ERYTHROCYTE [DISTWIDTH] IN BLOOD BY AUTOMATED COUNT: 13.3 % (ref 11.6–15.1)
ERYTHROCYTE [SEDIMENTATION RATE] IN BLOOD: 15 MM/HOUR (ref 0–19)
GFR SERPL CREATININE-BSD FRML MDRD: 86 ML/MIN/1.73SQ M
GLUCOSE P FAST SERPL-MCNC: 101 MG/DL (ref 65–99)
HCT VFR BLD AUTO: 45.1 % (ref 34.8–46.1)
HGB BLD-MCNC: 14.6 G/DL (ref 11.5–15.4)
IMM GRANULOCYTES # BLD AUTO: 0.03 THOUSAND/UL (ref 0–0.2)
IMM GRANULOCYTES NFR BLD AUTO: 0 % (ref 0–2)
LYMPHOCYTES # BLD AUTO: 2.08 THOUSANDS/ÂΜL (ref 0.6–4.47)
LYMPHOCYTES NFR BLD AUTO: 24 % (ref 14–44)
MCH RBC QN AUTO: 27.8 PG (ref 26.8–34.3)
MCHC RBC AUTO-ENTMCNC: 32.4 G/DL (ref 31.4–37.4)
MCV RBC AUTO: 86 FL (ref 82–98)
MONOCYTES # BLD AUTO: 0.6 THOUSAND/ÂΜL (ref 0.17–1.22)
MONOCYTES NFR BLD AUTO: 7 % (ref 4–12)
NEUTROPHILS # BLD AUTO: 5.87 THOUSANDS/ÂΜL (ref 1.85–7.62)
NEUTS SEG NFR BLD AUTO: 67 % (ref 43–75)
NRBC BLD AUTO-RTO: 0 /100 WBCS
PLATELET # BLD AUTO: 423 THOUSANDS/UL (ref 149–390)
PMV BLD AUTO: 9.7 FL (ref 8.9–12.7)
POTASSIUM SERPL-SCNC: 4.3 MMOL/L (ref 3.5–5.3)
PROT SERPL-MCNC: 7.4 G/DL (ref 6.4–8.4)
RBC # BLD AUTO: 5.25 MILLION/UL (ref 3.81–5.12)
SODIUM SERPL-SCNC: 138 MMOL/L (ref 135–147)
WBC # BLD AUTO: 8.68 THOUSAND/UL (ref 4.31–10.16)

## 2023-12-29 PROCEDURE — 85025 COMPLETE CBC W/AUTO DIFF WBC: CPT

## 2023-12-29 PROCEDURE — 36415 COLL VENOUS BLD VENIPUNCTURE: CPT

## 2023-12-29 PROCEDURE — 80053 COMPREHEN METABOLIC PANEL: CPT

## 2023-12-29 PROCEDURE — 86140 C-REACTIVE PROTEIN: CPT

## 2023-12-29 PROCEDURE — 85652 RBC SED RATE AUTOMATED: CPT

## 2024-01-19 ENCOUNTER — RA CDI HCC (OUTPATIENT)
Dept: OTHER | Facility: HOSPITAL | Age: 44
End: 2024-01-19

## 2024-01-19 NOTE — PROGRESS NOTES
HCC coding opportunities          Chart Reviewed number of suggestions sent to Provider: 1  E66.01     Patients Insurance        Commercial Insurance: [employer]

## 2024-02-28 DIAGNOSIS — I10 PRIMARY HYPERTENSION: ICD-10-CM

## 2024-02-28 DIAGNOSIS — J30.2 SEASONAL ALLERGIES: ICD-10-CM

## 2024-02-28 RX ORDER — LISINOPRIL 40 MG/1
40 TABLET ORAL DAILY
Qty: 30 TABLET | Refills: 2 | Status: SHIPPED | OUTPATIENT
Start: 2024-02-28 | End: 2024-05-28

## 2024-02-28 RX ORDER — FLUTICASONE PROPIONATE 50 MCG
2 SPRAY, SUSPENSION (ML) NASAL DAILY
Qty: 16 G | Refills: 3 | Status: SHIPPED | OUTPATIENT
Start: 2024-02-28

## 2024-03-05 ENCOUNTER — OFFICE VISIT (OUTPATIENT)
Dept: URGENT CARE | Facility: CLINIC | Age: 44
End: 2024-03-05
Payer: COMMERCIAL

## 2024-03-05 VITALS
WEIGHT: 192.8 LBS | HEART RATE: 82 BPM | OXYGEN SATURATION: 97 % | SYSTOLIC BLOOD PRESSURE: 140 MMHG | TEMPERATURE: 98.3 F | DIASTOLIC BLOOD PRESSURE: 80 MMHG | HEIGHT: 60 IN | BODY MASS INDEX: 37.85 KG/M2 | RESPIRATION RATE: 20 BRPM

## 2024-03-05 DIAGNOSIS — J01.90 ACUTE SINUSITIS, RECURRENCE NOT SPECIFIED, UNSPECIFIED LOCATION: Primary | ICD-10-CM

## 2024-03-05 PROCEDURE — 99213 OFFICE O/P EST LOW 20 MIN: CPT | Performed by: PHYSICIAN ASSISTANT

## 2024-03-05 RX ORDER — PREDNISONE 20 MG/1
TABLET ORAL
Qty: 9 TABLET | Refills: 0 | Status: SHIPPED | OUTPATIENT
Start: 2024-03-05

## 2024-03-05 RX ORDER — AMOXICILLIN 875 MG/1
875 TABLET, COATED ORAL 2 TIMES DAILY
Qty: 20 TABLET | Refills: 0 | Status: SHIPPED | OUTPATIENT
Start: 2024-03-05 | End: 2024-03-15

## 2024-03-05 NOTE — PROGRESS NOTES
[facility]    NAME: Marilin Morgan is a 43 y.o. female  : 1980    MRN: [id number]  DATE: 2024  TIME: 9:54 AM    Assessment and Plan   Acute sinusitis, recurrence not specified, unspecified location [J01.90]  1. Acute sinusitis, recurrence not specified, unspecified location  amoxicillin (AMOXIL) 875 mg tablet    predniSONE 20 mg tablet          Patient Instructions     Patient Instructions   I have prescribed an antibiotic for the infection.  Please take the antibiotic as prescribed and finish the entire prescription.  Take an over the counter probiotic or eat yogurt with live cultures in it (activia) to keep good bacteria in the gut and help prevent diarrhea.  Wash hands frequently to prevent the spread of infection.  Can use over the counter cough and cold medications to help with symptoms.  Ibuprofen and/or tylenol as needed for pain or fever.  If not improving over the next 7-10 days, follow up with PCP.          Chief Complaint     Chief Complaint   Patient presents with    Earache     Neto ear pressure x 5 days     Nasal Congestion     Sinus headache and post nasal drip        History of Present Illness   Earache   There is pain in the right ear. This is a new problem. The current episode started in the past 7 days. The problem occurs every few minutes. The problem has been gradually worsening. There has been no fever. The pain is at a severity of 5/10. Associated symptoms include headaches and neck pain. Pertinent negatives include no abdominal pain, coughing, diarrhea, ear discharge, hearing loss, rash, rhinorrhea, sore throat or vomiting.       Review of Systems   Review of Systems   HENT:  Positive for ear pain. Negative for ear discharge, hearing loss, rhinorrhea and sore throat.    Respiratory:  Negative for cough.    Gastrointestinal:  Negative for abdominal pain, diarrhea and vomiting.   Musculoskeletal:  Positive for neck pain.   Skin:  Negative for rash.   Neurological:   Positive for headaches.       Current Medications     Current Outpatient Medications:     amoxicillin (AMOXIL) 875 mg tablet, Take 1 tablet (875 mg total) by mouth 2 (two) times a day for 10 days, Disp: 20 tablet, Rfl: 0    fluticasone (FLONASE) 50 mcg/act nasal spray, 2 sprays into each nostril daily, Disp: 16 g, Rfl: 3    lisinopril (ZESTRIL) 40 mg tablet, Take 1 tablet (40 mg total) by mouth daily, Disp: 30 tablet, Rfl: 2    mesalamine (APRISO) 0.375 g 24 hr capsule, , Disp: , Rfl:     Mibelas 24 Fe 1-20 MG-MCG(24) CHEW, , Disp: , Rfl:     Multiple Vitamin (multivitamin) tablet, Take 1 tablet by mouth daily, Disp: , Rfl:     omeprazole (PriLOSEC) 20 mg delayed release capsule, daily, Disp: , Rfl:     predniSONE 20 mg tablet, Take 2 tablets daily for 3 days then 1 tablet daily for 3 days, Disp: 9 tablet, Rfl: 0    Risankizumab-rzaa (SKYRIZI IV), Inject as directed, Disp: , Rfl:     Cholecalciferol (VITAMIN D3 PO), Take 4,000 Units by mouth, Disp: , Rfl:     clobetasol (TEMOVATE) 0.05 % external solution, Apply topically in the morning, Disp: 50 mL, Rfl: 0    ELDERBERRY PO, Take by mouth, Disp: , Rfl:     metoprolol succinate (TOPROL-XL) 25 mg 24 hr tablet, Take 1 tablet (25 mg total) by mouth daily Please take half a tablet per day., Disp: 30 tablet, Rfl: 2    sucralfate (CARAFATE) 1 g tablet, 2 (two) times a day (Patient not taking: Reported on 3/5/2024), Disp: , Rfl:     Current Allergies     Allergies as of 03/05/2024    (No Known Allergies)          The following portions of the patient's history were reviewed and updated as appropriate: allergies, current medications, past family history, past medical history, past social history, past surgical history and problem list.   Past Medical History:   Diagnosis Date    COVID-19 vaccine administered     J & J in March 2021    Crohn disease (HCC)     Hypertension     IBS (irritable bowel syndrome)     Inflammatory bowel disease 2003    Crohns dx 11/2020    Kidney  stone 06/2021    Lithotripsy done 7/2021    Obstruction of right ureteropelvic junction (UPJ) due to stone 6/18/2021    Obstruction of right ureteropelvic junction due to stone     PONV (postoperative nausea and vomiting)     Pt requests to be pre-medicated for N/V prior to sx    Psoriatic arthritis (HCC)     Wears glasses      Past Surgical History:   Procedure Laterality Date    CHOLECYSTECTOMY      IN CYSTO BLADDER W/URETERAL CATHETERIZATION Right 6/18/2021    Procedure: CYSTOSCOPY RETROGRADE PYELOGRAM WITH INSERTION STENT URETERAL;  Surgeon: Oren Jay MD;  Location:  MAIN OR;  Service: Urology    IN LITHOTRIPSY XTRCORP SHOCK WAVE Right 7/16/2021    Procedure: LITHOTRIPSY EXTRACORPORAL SHOCKWAVE (ESWL);  Surgeon: Oren Jay MD;  Location:  MAIN OR;  Service: Urology    TONSILLECTOMY       Family History   Problem Relation Age of Onset    Diabetes Mother     Hypertension Father     Heart attack Father     No Known Problems Maternal Grandmother     No Known Problems Maternal Grandfather     No Known Problems Paternal Grandmother     No Known Problems Paternal Grandfather     No Known Problems Maternal Aunt     No Known Problems Maternal Aunt     Breast cancer Paternal Aunt 41     Social History     Socioeconomic History    Marital status: /Civil Union     Spouse name: Not on file    Number of children: Not on file    Years of education: Not on file    Highest education level: Not on file   Occupational History    Not on file   Tobacco Use    Smoking status: Never    Smokeless tobacco: Never   Vaping Use    Vaping status: Never Used   Substance and Sexual Activity    Alcohol use: Yes     Comment: Social drinker    Drug use: Never    Sexual activity: Yes     Partners: Male     Birth control/protection: OCP   Other Topics Concern    Not on file   Social History Narrative    Not on file     Social Determinants of Health     Financial Resource Strain: Not on file   Food Insecurity: Not on file    Transportation Needs: Not on file   Physical Activity: Not on file   Stress: Not on file   Social Connections: Not on file   Intimate Partner Violence: Not on file   Housing Stability: Not on file     Medications have been verified.    Objective   /80   Pulse 82   Temp 98.3 °F (36.8 °C)   Resp 20   Ht 5' (1.524 m)   Wt 87.5 kg (192 lb 12.8 oz)   SpO2 97%   BMI 37.65 kg/m²      Physical Exam   Physical Exam  Vitals and nursing note reviewed.   Constitutional:       General: She is not in acute distress.     Appearance: She is well-developed.   HENT:      Head: Normocephalic and atraumatic.      Right Ear: Tympanic membrane is retracted.      Left Ear: Tympanic membrane is retracted.      Nose: Congestion present. No mucosal edema.      Right Sinus: No maxillary sinus tenderness or frontal sinus tenderness.      Left Sinus: No maxillary sinus tenderness or frontal sinus tenderness.      Mouth/Throat:      Pharynx: No oropharyngeal exudate or posterior oropharyngeal erythema.   Eyes:      Conjunctiva/sclera: Conjunctivae normal.   Cardiovascular:      Rate and Rhythm: Normal rate and regular rhythm.      Heart sounds: Normal heart sounds. No murmur heard.  Pulmonary:      Effort: Pulmonary effort is normal.      Breath sounds: Normal breath sounds.

## 2024-03-05 NOTE — PATIENT INSTRUCTIONS
I have prescribed an antibiotic for the infection.  Please take the antibiotic as prescribed and finish the entire prescription.  Take an over the counter probiotic or eat yogurt with live cultures in it (activia) to keep good bacteria in the gut and help prevent diarrhea.  Wash hands frequently to prevent the spread of infection.  Can use over the counter cough and cold medications to help with symptoms.  Ibuprofen and/or tylenol as needed for pain or fever.  If not improving over the next 7-10 days, follow up with PCP.

## 2024-03-08 ENCOUNTER — OFFICE VISIT (OUTPATIENT)
Dept: FAMILY MEDICINE CLINIC | Facility: CLINIC | Age: 44
End: 2024-03-08
Payer: COMMERCIAL

## 2024-03-08 VITALS
TEMPERATURE: 98.4 F | HEIGHT: 60 IN | WEIGHT: 193.6 LBS | HEART RATE: 96 BPM | SYSTOLIC BLOOD PRESSURE: 130 MMHG | BODY MASS INDEX: 38.01 KG/M2 | OXYGEN SATURATION: 98 % | DIASTOLIC BLOOD PRESSURE: 82 MMHG

## 2024-03-08 DIAGNOSIS — K50.90 CROHN'S DISEASE WITHOUT COMPLICATION, UNSPECIFIED GASTROINTESTINAL TRACT LOCATION (HCC): ICD-10-CM

## 2024-03-08 DIAGNOSIS — E78.00 HYPERCHOLESTEROLEMIA: ICD-10-CM

## 2024-03-08 DIAGNOSIS — I10 PRIMARY HYPERTENSION: Primary | ICD-10-CM

## 2024-03-08 DIAGNOSIS — L40.50 PSORIATIC ARTHRITIS (HCC): ICD-10-CM

## 2024-03-08 PROCEDURE — 99214 OFFICE O/P EST MOD 30 MIN: CPT | Performed by: FAMILY MEDICINE

## 2024-03-08 NOTE — PROGRESS NOTES
Assessment/Plan:    No problem-specific Assessment & Plan notes found for this encounter.       Diagnoses and all orders for this visit:    Primary hypertension  Comments:  no change in the medication  Orders:  -     CBC and differential; Future  -     TSH, 3rd generation with Free T4 reflex; Future    Hypercholesterolemia  Comments:  pt counseled on diet and exercise  Orders:  -     Comprehensive metabolic panel; Future  -     Lipid panel; Future    Crohn's disease without complication, unspecified gastrointestinal tract location (HCC)  Comments:  stable, per gastro    Psoriatic arthritis (HCC)  Comments:  stable on skyrizi          PHQ-2/9 Depression Screening    Little interest or pleasure in doing things: 0 - not at all  Feeling down, depressed, or hopeless: 0 - not at all  PHQ-2 Score: 0  PHQ-2 Interpretation: Negative depression screen            Subjective:      Patient ID: Marilin Morgan is a 43 y.o. female.    Hypertension  This is a chronic problem. The current episode started more than 1 year ago. The problem is unchanged. The problem is controlled. Pertinent negatives include no chest pain, headaches or palpitations. Risk factors for coronary artery disease include obesity and sedentary lifestyle. Past treatments include ACE inhibitors. The current treatment provides moderate improvement. Compliance problems include exercise and diet.        The following portions of the patient's history were reviewed and updated as appropriate: allergies, current medications, past family history, past medical history, past social history, past surgical history, and problem list.    Review of Systems   Eyes:  Negative for visual disturbance.   Cardiovascular:  Negative for chest pain and palpitations.   Neurological:  Negative for headaches.         Objective:    /82 (BP Location: Left arm, Patient Position: Sitting)   Pulse 96   Temp 98.4 °F (36.9 °C) (Tympanic)   Ht 5' (1.524 m)   Wt 87.8 kg (193 lb 9.6 oz)    LMP  (LMP Unknown)   SpO2 98%   BMI 37.81 kg/m²      Physical Exam  Vitals and nursing note reviewed.   Constitutional:       General: She is not in acute distress.     Appearance: Normal appearance. She is not ill-appearing, toxic-appearing or diaphoretic.   HENT:      Head: Normocephalic and atraumatic.   Eyes:      Conjunctiva/sclera: Conjunctivae normal.   Cardiovascular:      Rate and Rhythm: Normal rate and regular rhythm.      Heart sounds: Normal heart sounds. No murmur heard.  Pulmonary:      Effort: Pulmonary effort is normal. No respiratory distress.      Breath sounds: Normal breath sounds. No wheezing, rhonchi or rales.   Abdominal:      General: There is no distension.      Palpations: Abdomen is soft. There is no mass.      Tenderness: There is no abdominal tenderness. There is no guarding or rebound.   Musculoskeletal:      Cervical back: Normal range of motion and neck supple. No rigidity.      Right lower leg: No edema.      Left lower leg: No edema.   Lymphadenopathy:      Cervical: No cervical adenopathy.   Neurological:      Mental Status: She is alert and oriented to person, place, and time.   Psychiatric:         Mood and Affect: Mood normal.         Behavior: Behavior normal.         Thought Content: Thought content normal.         Judgment: Judgment normal.

## 2024-03-11 ENCOUNTER — HOSPITAL ENCOUNTER (OUTPATIENT)
Dept: ULTRASOUND IMAGING | Facility: HOSPITAL | Age: 44
Discharge: HOME | End: 2024-03-11
Attending: UROLOGY
Payer: COMMERCIAL

## 2024-03-11 DIAGNOSIS — N83.209 OVARIAN CYST: ICD-10-CM

## 2024-03-11 DIAGNOSIS — D25.0 SUBMUCOUS LEIOMYOMA OF UTERUS: ICD-10-CM

## 2024-03-11 DIAGNOSIS — N20.0 CALCULUS OF KIDNEY: ICD-10-CM

## 2024-03-11 PROCEDURE — 76775 US EXAM ABDO BACK WALL LIM: CPT

## 2024-03-11 PROCEDURE — 76856 US EXAM PELVIC COMPLETE: CPT

## 2024-03-11 PROCEDURE — 76830 TRANSVAGINAL US NON-OB: CPT

## 2024-03-19 ENCOUNTER — OFFICE VISIT (OUTPATIENT)
Dept: URGENT CARE | Facility: CLINIC | Age: 44
End: 2024-03-19
Payer: COMMERCIAL

## 2024-03-19 VITALS
RESPIRATION RATE: 20 BRPM | SYSTOLIC BLOOD PRESSURE: 140 MMHG | OXYGEN SATURATION: 97 % | TEMPERATURE: 98.5 F | BODY MASS INDEX: 36.52 KG/M2 | DIASTOLIC BLOOD PRESSURE: 80 MMHG | HEART RATE: 106 BPM | WEIGHT: 187 LBS

## 2024-03-19 DIAGNOSIS — R68.89 FLU-LIKE SYMPTOMS: Primary | ICD-10-CM

## 2024-03-19 PROCEDURE — 99213 OFFICE O/P EST LOW 20 MIN: CPT | Performed by: NURSE PRACTITIONER

## 2024-03-19 NOTE — PROGRESS NOTES
[facility]        NAME: Marilin Morgan is a 43 y.o. female  : 1980    MRN: [id number]  DATE: 2024  TIME: 10:48 AM    Assessment and Plan   Flu-like symptoms [R68.89]  1. Flu-like symptoms              Patient Instructions       Follow up with PCP in 3-5 days.  Proceed to  ER if symptoms worsen.    If tests have been performed at [facility], our office will contact you with results if changes need to be made to the care plan discussed with you at the visit.  You can review your full results on [facility] MyChart.    You have flu like symptoms.  You are to rest. Drink gatorade or pedialyte for rehydration.    You are to eat a BRAT diet - bananas, rice, applesauce and toast.  You may try imodium for diarrhea.  Take tylenol or motrin for fever or pain.   You are to download [facility] mychart for the results in 24-48 hours.  You will be notified if the results are positive.  You are to mask as long as you are coughing, feverish or exhibiting symptoms.    Follow up with your PCP in 2-3 days  Go to the ED if symptoms worsen.      You have declined an xray today.              Chief Complaint     Chief Complaint   Patient presents with    Fever    Nasal Congestion    Diarrhea     Had diarrhea for several days , gone now          History of Present Illness       This is a 43 year old female who states for the last 3 days has had body aches, temps of 101.8, nausea, diarrhea. She states that the diarrhea has stopped but still feels fatigued.  She states that there have been several coworkers that have had flu as well. She denies flu vaccine.  She states has zofran at home for nausea. Denies vomiting.   PMH is listed. She states she did covid test and was negative.   States with cough has been bring up some green phlegm but over all states she feels as though she is getting better.     Fever  Associated symptoms include chills, congestion, coughing, fatigue, a fever, myalgias, nausea and a sore throat.    Diarrhea   Associated symptoms include chills, coughing, a fever and myalgias.       Review of Systems   Review of Systems   Constitutional:  Positive for chills, fatigue and fever.   HENT:  Positive for congestion and sore throat.    Eyes: Negative.    Respiratory:  Positive for cough.    Cardiovascular: Negative.    Gastrointestinal:  Positive for diarrhea and nausea.   Endocrine: Negative.    Genitourinary: Negative.    Musculoskeletal:  Positive for myalgias.   Skin: Negative.    Allergic/Immunologic: Negative.    Neurological: Negative.    Hematological: Negative.    Psychiatric/Behavioral: Negative.           Current Medications       Current Outpatient Medications:     Cholecalciferol (VITAMIN D3 PO), Take 4,000 Units by mouth, Disp: , Rfl:     clobetasol (TEMOVATE) 0.05 % external solution, Apply topically in the morning (Patient not taking: Reported on 3/8/2024), Disp: 50 mL, Rfl: 0    ELDERBERRY PO, Take by mouth, Disp: , Rfl:     fluticasone (FLONASE) 50 mcg/act nasal spray, 2 sprays into each nostril daily, Disp: 16 g, Rfl: 3    lisinopril (ZESTRIL) 40 mg tablet, Take 1 tablet (40 mg total) by mouth daily, Disp: 30 tablet, Rfl: 2    mesalamine (APRISO) 0.375 g 24 hr capsule, , Disp: , Rfl:     metoprolol succinate (TOPROL-XL) 25 mg 24 hr tablet, Take 1 tablet (25 mg total) by mouth daily Please take half a tablet per day., Disp: 30 tablet, Rfl: 2    Mibelas 24 Fe 1-20 MG-MCG(24) CHEW, , Disp: , Rfl:     Multiple Vitamin (multivitamin) tablet, Take 1 tablet by mouth daily, Disp: , Rfl:     omeprazole (PriLOSEC) 20 mg delayed release capsule, daily, Disp: , Rfl:     predniSONE 20 mg tablet, Take 2 tablets daily for 3 days then 1 tablet daily for 3 days, Disp: 9 tablet, Rfl: 0    Risankizumab-rzaa (SKYRIZI IV), Inject as directed, Disp: , Rfl:     sucralfate (CARAFATE) 1 g tablet, 2 (two) times a day (Patient not taking: Reported on 3/5/2024), Disp: , Rfl:     Current Allergies     Allergies as of  03/19/2024    (No Known Allergies)            The following portions of the patient's history were reviewed and updated as appropriate: allergies, current medications, past family history, past medical history, past social history, past surgical history and problem list.     Past Medical History:   Diagnosis Date    COVID-19 vaccine administered     J & J in March 2021    Crohn disease (HCC)     Hypertension     IBS (irritable bowel syndrome)     Inflammatory bowel disease 2003    Crohns dx 11/2020    Kidney stone 06/2021    Lithotripsy done 7/2021    Obstruction of right ureteropelvic junction (UPJ) due to stone 6/18/2021    Obstruction of right ureteropelvic junction due to stone     PONV (postoperative nausea and vomiting)     Pt requests to be pre-medicated for N/V prior to sx    Psoriatic arthritis (HCC)     Wears glasses        Past Surgical History:   Procedure Laterality Date    CHOLECYSTECTOMY      WA CYSTO BLADDER W/URETERAL CATHETERIZATION Right 6/18/2021    Procedure: CYSTOSCOPY RETROGRADE PYELOGRAM WITH INSERTION STENT URETERAL;  Surgeon: Oren Jay MD;  Location:  MAIN OR;  Service: Urology    WA LITHOTRIPSY XTRCORP SHOCK WAVE Right 7/16/2021    Procedure: LITHOTRIPSY EXTRACORPORAL SHOCKWAVE (ESWL);  Surgeon: Oren Jay MD;  Location:  MAIN OR;  Service: Urology    TONSILLECTOMY         Family History   Problem Relation Age of Onset    Diabetes Mother     Hypertension Father     Heart attack Father     No Known Problems Maternal Grandmother     No Known Problems Maternal Grandfather     No Known Problems Paternal Grandmother     No Known Problems Paternal Grandfather     No Known Problems Maternal Aunt     No Known Problems Maternal Aunt     Breast cancer Paternal Aunt 41         Medications have been verified.        Objective   /80   Pulse (!) 106   Temp 98.5 °F (36.9 °C)   Resp 20   Wt 84.8 kg (187 lb)   LMP  (LMP Unknown)   SpO2 97%   BMI 36.52 kg/m²   No LMP recorded (lmp  unknown). (Menstrual status: Birth Control).       Physical Exam     Physical Exam  Vitals and nursing note reviewed.   Constitutional:       General: She is not in acute distress.     Appearance: Normal appearance. She is obese. She is not ill-appearing, toxic-appearing or diaphoretic.   HENT:      Head: Normocephalic and atraumatic.      Right Ear: Tympanic membrane and ear canal normal.      Left Ear: Tympanic membrane and ear canal normal.      Nose: No congestion or rhinorrhea.      Mouth/Throat:      Mouth: Mucous membranes are moist.      Pharynx: No oropharyngeal exudate or posterior oropharyngeal erythema.   Eyes:      Extraocular Movements: Extraocular movements intact.   Cardiovascular:      Rate and Rhythm: Normal rate and regular rhythm.      Pulses: Normal pulses.      Heart sounds: Normal heart sounds. No murmur heard.  Pulmonary:      Effort: Pulmonary effort is normal. No respiratory distress.      Breath sounds: Normal breath sounds. No stridor. No wheezing, rhonchi or rales.   Chest:      Chest wall: No tenderness.   Musculoskeletal:         General: Normal range of motion.      Cervical back: Normal range of motion and neck supple.   Skin:     General: Skin is warm and dry.      Capillary Refill: Capillary refill takes less than 2 seconds.   Neurological:      General: No focal deficit present.      Mental Status: She is alert and oriented to person, place, and time. Mental status is at baseline.   Psychiatric:         Mood and Affect: Mood normal.         Behavior: Behavior normal.         Thought Content: Thought content normal.         Judgment: Judgment normal.

## 2024-03-19 NOTE — PATIENT INSTRUCTIONS
You have flu like symptoms.  You are to rest. Drink gatorade or pedialyte for rehydration.    You are to eat a BRAT diet - bananas, rice, applesauce and toast.  You may try imodium for diarrhea.  Take tylenol or motrin for fever or pain.   You are to download [employer] for the results in 24-48 hours.  You will be notified if the results are positive.  You are to mask as long as you are coughing, feverish or exhibiting symptoms.    Follow up with your PCP in 2-3 days  Go to the ED if symptoms worsen.      You have declined an xray today.

## 2024-03-19 NOTE — LETTER
March 19, 2024     Patient: Marilin Morgan   YOB: 1980   Date of Visit: 3/19/2024       To Whom It May Concern:    It is my medical opinion that Marilin Morgan may return to work on 3/21/2024 .    If you have any questions or concerns, please don't hesitate to call.         Sincerely,        DIANE Liriano    CC: No Recipients

## 2024-04-26 ENCOUNTER — OFFICE VISIT (OUTPATIENT)
Dept: URGENT CARE | Facility: CLINIC | Age: 44
End: 2024-04-26
Payer: COMMERCIAL

## 2024-04-26 VITALS
OXYGEN SATURATION: 99 % | HEART RATE: 85 BPM | TEMPERATURE: 97.8 F | DIASTOLIC BLOOD PRESSURE: 89 MMHG | RESPIRATION RATE: 16 BRPM | SYSTOLIC BLOOD PRESSURE: 139 MMHG

## 2024-04-26 DIAGNOSIS — B37.2 CANDIDIASIS OF SKIN: Primary | ICD-10-CM

## 2024-04-26 PROCEDURE — 99214 OFFICE O/P EST MOD 30 MIN: CPT | Performed by: NURSE PRACTITIONER

## 2024-04-26 RX ORDER — NYSTATIN 100000 U/G
CREAM TOPICAL 2 TIMES DAILY
Qty: 30 G | Refills: 0 | Status: SHIPPED | OUTPATIENT
Start: 2024-04-26 | End: 2024-05-11

## 2024-04-26 NOTE — PATIENT INSTRUCTIONS
Wash and dry area very well daily.  Apply the nystatin cream as prescribed and even 3 days after it has cleared.  Do not shave until rash is completely gone.   Follow up with your PCP in 3-5 days  Go to the ED if symptoms worsen

## 2024-04-26 NOTE — PROGRESS NOTES
"  [facility]        NAME: Marilin Morgan is a 43 y.o. female  : 1980    MRN: [id number]  DATE: 2024  TIME: 8:21 AM    Assessment and Plan   Candidiasis of skin [B37.2]  1. Candidiasis of skin  nystatin (MYCOSTATIN) cream            Patient Instructions       Follow up with PCP in 3-5 days.  Proceed to  ER if symptoms worsen.    If tests have been performed at [facility], our office will contact you with results if changes need to be made to the care plan discussed with you at the visit.  You can review your full results on . [place]'s MyChart.    Wash and dry area very well daily.  Apply the nystatin cream as prescribed and even 3 days after it has cleared.  Do not shave until rash is completely gone.   Follow up with your PCP in 3-5 days  Go to the ED if symptoms worsen         Chief Complaint     Chief Complaint   Patient presents with    Rash     C/o rash to \"underneath left breast and in my groin fold\" as per patient onset \"\". Pt reports intermittent itching, denies drainage or fever. Pt denies any new foods or environmental changes         History of Present Illness       This is a 43 year old female who states was on vacation at the beach and after she got home she developed a rash with itching and burning under the left breast and in bilateral groin folds.  She states it is itchy and burns. She has shaved the pubic area. She denies drainage, fevers.  PMH is listed.          Review of Systems   Review of Systems   Constitutional: Negative.    HENT: Negative.     Eyes: Negative.    Respiratory: Negative.     Cardiovascular: Negative.    Gastrointestinal: Negative.    Endocrine: Negative.    Genitourinary: Negative.    Musculoskeletal: Negative.    Skin:  Positive for rash.   Allergic/Immunologic: Negative.    Neurological: Negative.    Hematological: Negative.    Psychiatric/Behavioral: Negative.           Current Medications       Current Outpatient Medications:     nystatin " (MYCOSTATIN) cream, Apply topically 2 (two) times a day for 15 days, Disp: 30 g, Rfl: 0    Cholecalciferol (VITAMIN D3 PO), Take 4,000 Units by mouth, Disp: , Rfl:     clobetasol (TEMOVATE) 0.05 % external solution, Apply topically in the morning (Patient not taking: Reported on 3/8/2024), Disp: 50 mL, Rfl: 0    ELDERBERRY PO, Take by mouth, Disp: , Rfl:     fluticasone (FLONASE) 50 mcg/act nasal spray, 2 sprays into each nostril daily, Disp: 16 g, Rfl: 3    lisinopril (ZESTRIL) 40 mg tablet, Take 1 tablet (40 mg total) by mouth daily, Disp: 30 tablet, Rfl: 2    mesalamine (APRISO) 0.375 g 24 hr capsule, , Disp: , Rfl:     metoprolol succinate (TOPROL-XL) 25 mg 24 hr tablet, Take 1 tablet (25 mg total) by mouth daily Please take half a tablet per day., Disp: 30 tablet, Rfl: 2    Mibelas 24 Fe 1-20 MG-MCG(24) CHEW, , Disp: , Rfl:     Multiple Vitamin (multivitamin) tablet, Take 1 tablet by mouth daily, Disp: , Rfl:     omeprazole (PriLOSEC) 20 mg delayed release capsule, daily, Disp: , Rfl:     predniSONE 20 mg tablet, Take 2 tablets daily for 3 days then 1 tablet daily for 3 days, Disp: 9 tablet, Rfl: 0    Risankizumab-rzaa (SKYRIZI IV), Inject as directed, Disp: , Rfl:     sucralfate (CARAFATE) 1 g tablet, 2 (two) times a day (Patient not taking: Reported on 3/5/2024), Disp: , Rfl:     Current Allergies     Allergies as of 04/26/2024    (No Known Allergies)            The following portions of the patient's history were reviewed and updated as appropriate: allergies, current medications, past family history, past medical history, past social history, past surgical history and problem list.     Past Medical History:   Diagnosis Date    COVID-19 vaccine administered     J & J in March 2021    Crohn disease (HCC)     Hypertension     IBS (irritable bowel syndrome)     Inflammatory bowel disease 2003    Crohns dx 11/2020    Kidney stone 06/2021    Lithotripsy done 7/2021    Obstruction of right ureteropelvic junction  (UPJ) due to stone 6/18/2021    Obstruction of right ureteropelvic junction due to stone     PONV (postoperative nausea and vomiting)     Pt requests to be pre-medicated for N/V prior to sx    Psoriatic arthritis (HCC)     Wears glasses        Past Surgical History:   Procedure Laterality Date    CHOLECYSTECTOMY      [place] CYSTO BLADDER W/URETERAL CATHETERIZATION Right 6/18/2021    Procedure: CYSTOSCOPY RETROGRADE PYELOGRAM WITH INSERTION STENT URETERAL;  Surgeon: Oren Jay MD;  Location:  MAIN OR;  Service: Urology    WY LITHOTRIPSY XTRCORP SHOCK WAVE Right 7/16/2021    Procedure: LITHOTRIPSY EXTRACORPORAL SHOCKWAVE (ESWL);  Surgeon: Oren Jay MD;  Location:  MAIN OR;  Service: Urology    TONSILLECTOMY         Family History   Problem Relation Age of Onset    Diabetes Mother     Hypertension Father     Heart attack Father     No Known Problems Maternal Grandmother     No Known Problems Maternal Grandfather     No Known Problems Paternal Grandmother     No Known Problems Paternal Grandfather     No Known Problems Maternal Aunt     No Known Problems Maternal Aunt     Breast cancer Paternal Aunt 41         Medications have been verified.        Objective   /89 (BP Location: Right arm, Patient Position: Sitting, Cuff Size: Standard)   Pulse 85   Temp 97.8 °F (36.6 °C) (Temporal)   Resp 16   SpO2 99%   No LMP recorded. (Menstrual status: Birth Control).       Physical Exam     Physical Exam  Vitals and nursing note reviewed.   Constitutional:       General: She is not in acute distress.     Appearance: Normal appearance. She is normal weight. She is not ill-appearing, toxic-appearing or diaphoretic.   HENT:      Head: Normocephalic and atraumatic.   Eyes:      Extraocular Movements: Extraocular movements intact.   Cardiovascular:      Rate and Rhythm: Normal rate.      Pulses: Normal pulses.   Pulmonary:      Effort: Pulmonary effort is normal.   Musculoskeletal:         General: Normal range of motion.       Cervical back: Normal range of motion.   Skin:     General: Skin is warm.      Capillary Refill: Capillary refill takes less than 2 seconds.      Findings: Rash present.          Neurological:      General: No focal deficit present.      Mental Status: She is alert and oriented to person, place, and time.   Psychiatric:         Mood and Affect: Mood normal.         Behavior: Behavior normal.         Thought Content: Thought content normal.         Judgment: Judgment normal.

## 2024-05-10 ENCOUNTER — APPOINTMENT (OUTPATIENT)
Age: 44
End: 2024-05-10
Payer: COMMERCIAL

## 2024-05-10 DIAGNOSIS — E78.00 HYPERCHOLESTEROLEMIA: ICD-10-CM

## 2024-05-10 DIAGNOSIS — I10 PRIMARY HYPERTENSION: ICD-10-CM

## 2024-05-10 DIAGNOSIS — L40.50 PSORIATIC ARTHRITIS (HCC): Primary | ICD-10-CM

## 2024-05-10 DIAGNOSIS — L40.50 PA (PSORIATIC ARTHRITIS) (HCC): ICD-10-CM

## 2024-05-10 LAB
ALBUMIN SERPL BCP-MCNC: 4.2 G/DL (ref 3.5–5)
ALP SERPL-CCNC: 58 U/L (ref 34–104)
ALT SERPL W P-5'-P-CCNC: 12 U/L (ref 7–52)
ANION GAP SERPL CALCULATED.3IONS-SCNC: 9 MMOL/L (ref 4–13)
AST SERPL W P-5'-P-CCNC: 15 U/L (ref 13–39)
BASOPHILS # BLD AUTO: 0.04 THOUSANDS/ÂΜL (ref 0–0.1)
BASOPHILS NFR BLD AUTO: 1 % (ref 0–1)
BILIRUB SERPL-MCNC: 0.43 MG/DL (ref 0.2–1)
BUN SERPL-MCNC: 14 MG/DL (ref 5–25)
CALCIUM SERPL-MCNC: 8.9 MG/DL (ref 8.4–10.2)
CHLORIDE SERPL-SCNC: 104 MMOL/L (ref 96–108)
CO2 SERPL-SCNC: 25 MMOL/L (ref 21–32)
CREAT SERPL-MCNC: 0.73 MG/DL (ref 0.6–1.3)
CRP SERPL QL: 9 MG/L
EOSINOPHIL # BLD AUTO: 0.1 THOUSAND/ÂΜL (ref 0–0.61)
EOSINOPHIL NFR BLD AUTO: 1 % (ref 0–6)
ERYTHROCYTE [DISTWIDTH] IN BLOOD BY AUTOMATED COUNT: 13.4 % (ref 11.6–15.1)
ERYTHROCYTE [SEDIMENTATION RATE] IN BLOOD: 31 MM/HOUR (ref 0–19)
FERRITIN SERPL-MCNC: 41 NG/ML (ref 11–307)
GFR SERPL CREATININE-BSD FRML MDRD: 99 ML/MIN/1.73SQ M
GLUCOSE P FAST SERPL-MCNC: 106 MG/DL (ref 65–99)
HCT VFR BLD AUTO: 45.5 % (ref 34.8–46.1)
HGB BLD-MCNC: 14.1 G/DL (ref 11.5–15.4)
IMM GRANULOCYTES # BLD AUTO: 0.03 THOUSAND/UL (ref 0–0.2)
IMM GRANULOCYTES NFR BLD AUTO: 0 % (ref 0–2)
IRON SATN MFR SERPL: 23 % (ref 15–50)
IRON SERPL-MCNC: 98 UG/DL (ref 50–212)
LYMPHOCYTES # BLD AUTO: 1.92 THOUSANDS/ÂΜL (ref 0.6–4.47)
LYMPHOCYTES NFR BLD AUTO: 25 % (ref 14–44)
MCH RBC QN AUTO: 27.8 PG (ref 26.8–34.3)
MCHC RBC AUTO-ENTMCNC: 31 G/DL (ref 31.4–37.4)
MCV RBC AUTO: 90 FL (ref 82–98)
MONOCYTES # BLD AUTO: 0.61 THOUSAND/ÂΜL (ref 0.17–1.22)
MONOCYTES NFR BLD AUTO: 8 % (ref 4–12)
NEUTROPHILS # BLD AUTO: 4.99 THOUSANDS/ÂΜL (ref 1.85–7.62)
NEUTS SEG NFR BLD AUTO: 65 % (ref 43–75)
NRBC BLD AUTO-RTO: 0 /100 WBCS
PLATELET # BLD AUTO: 367 THOUSANDS/UL (ref 149–390)
PMV BLD AUTO: 9.7 FL (ref 8.9–12.7)
POTASSIUM SERPL-SCNC: 4.2 MMOL/L (ref 3.5–5.3)
PROT SERPL-MCNC: 7.2 G/DL (ref 6.4–8.4)
RBC # BLD AUTO: 5.07 MILLION/UL (ref 3.81–5.12)
SODIUM SERPL-SCNC: 138 MMOL/L (ref 135–147)
TIBC SERPL-MCNC: 425 UG/DL (ref 250–450)
UIBC SERPL-MCNC: 327 UG/DL (ref 155–355)
VIT B12 SERPL-MCNC: 467 PG/ML (ref 180–914)
WBC # BLD AUTO: 7.69 THOUSAND/UL (ref 4.31–10.16)

## 2024-05-10 PROCEDURE — 85025 COMPLETE CBC W/AUTO DIFF WBC: CPT

## 2024-05-10 PROCEDURE — 86140 C-REACTIVE PROTEIN: CPT

## 2024-05-10 PROCEDURE — 85652 RBC SED RATE AUTOMATED: CPT

## 2024-05-10 PROCEDURE — 82728 ASSAY OF FERRITIN: CPT

## 2024-05-10 PROCEDURE — 36415 COLL VENOUS BLD VENIPUNCTURE: CPT

## 2024-05-10 PROCEDURE — 83550 IRON BINDING TEST: CPT

## 2024-05-10 PROCEDURE — 82607 VITAMIN B-12: CPT

## 2024-05-10 PROCEDURE — 83540 ASSAY OF IRON: CPT

## 2024-05-10 PROCEDURE — 80053 COMPREHEN METABOLIC PANEL: CPT

## 2024-05-31 DIAGNOSIS — I10 PRIMARY HYPERTENSION: ICD-10-CM

## 2024-05-31 DIAGNOSIS — R00.0 INCREASED HEART RATE: ICD-10-CM

## 2024-05-31 RX ORDER — LISINOPRIL 40 MG/1
40 TABLET ORAL DAILY
Qty: 30 TABLET | Refills: 5 | Status: SHIPPED | OUTPATIENT
Start: 2024-05-31 | End: 2024-08-29

## 2024-05-31 RX ORDER — METOPROLOL SUCCINATE 25 MG/1
25 TABLET, EXTENDED RELEASE ORAL DAILY
Qty: 30 TABLET | Refills: 5 | Status: SHIPPED | OUTPATIENT
Start: 2024-05-31 | End: 2024-06-30

## 2024-06-28 ENCOUNTER — APPOINTMENT (EMERGENCY)
Dept: RADIOLOGY | Facility: HOSPITAL | Age: 44
End: 2024-06-28
Payer: COMMERCIAL

## 2024-06-28 ENCOUNTER — HOSPITAL ENCOUNTER (EMERGENCY)
Facility: HOSPITAL | Age: 44
Discharge: HOME | End: 2024-06-28
Attending: EMERGENCY MEDICINE
Payer: COMMERCIAL

## 2024-06-28 VITALS
DIASTOLIC BLOOD PRESSURE: 79 MMHG | TEMPERATURE: 98 F | RESPIRATION RATE: 18 BRPM | OXYGEN SATURATION: 97 % | SYSTOLIC BLOOD PRESSURE: 164 MMHG | HEART RATE: 81 BPM

## 2024-06-28 DIAGNOSIS — M79.676 TOE PAIN: Primary | ICD-10-CM

## 2024-06-28 PROCEDURE — 20600 DRAIN/INJ JOINT/BURSA W/O US: CPT | Performed by: EMERGENCY MEDICINE

## 2024-06-28 PROCEDURE — 73660 X-RAY EXAM OF TOE(S): CPT

## 2024-06-28 PROCEDURE — 99283 EMERGENCY DEPT VISIT LOW MDM: CPT

## 2024-06-28 PROCEDURE — 99284 EMERGENCY DEPT VISIT MOD MDM: CPT | Performed by: EMERGENCY MEDICINE

## 2024-06-28 PROCEDURE — 89060 EXAM SYNOVIAL FLUID CRYSTALS: CPT | Performed by: EMERGENCY MEDICINE

## 2024-06-28 RX ORDER — LIDOCAINE HYDROCHLORIDE 20 MG/ML
5 INJECTION, SOLUTION EPIDURAL; INFILTRATION; INTRACAUDAL; PERINEURAL ONCE
Status: COMPLETED | OUTPATIENT
Start: 2024-06-28 | End: 2024-06-28

## 2024-06-28 RX ORDER — TRIAMCINOLONE ACETONIDE 40 MG/ML
40 INJECTION, SUSPENSION INTRA-ARTICULAR; INTRAMUSCULAR ONCE
Status: COMPLETED | OUTPATIENT
Start: 2024-06-28 | End: 2024-06-28

## 2024-06-28 RX ADMIN — LIDOCAINE HYDROCHLORIDE 5 ML: 20 INJECTION, SOLUTION EPIDURAL; INFILTRATION; INTRACAUDAL; PERINEURAL at 17:21

## 2024-06-28 RX ADMIN — TRIAMCINOLONE ACETONIDE 40 MG: 40 INJECTION, SUSPENSION INTRA-ARTICULAR; INTRAMUSCULAR at 17:20

## 2024-06-28 NOTE — ED PROVIDER NOTES
History  Chief Complaint   Patient presents with    Toe Pain     Left great toe pain noticed swelling 2 months ago      43 F with left great toe pain.  Patient may have injured it 2 months ago however did not have it evaluated.  Symptoms started getting better intermittent swelling in that area since.      Toe Pain      Prior to Admission Medications   Prescriptions Last Dose Informant Patient Reported? Taking?   Cholecalciferol (VITAMIN D3 PO)  Self Yes No   Sig: Take 4,000 Units by mouth   ELDERBERRY PO   Yes No   Sig: Take by mouth   Mibelas 24 Fe 1-20 MG-MCG(24) CHEW  Self Yes No   Multiple Vitamin (multivitamin) tablet  Self Yes No   Sig: Take 1 tablet by mouth daily   Risankizumab-rzaa (SKYRIZI IV)   Yes No   Sig: Inject as directed   clobetasol (TEMOVATE) 0.05 % external solution   No No   Sig: Apply topically in the morning   Patient not taking: Reported on 3/8/2024   fluticasone (FLONASE) 50 mcg/act nasal spray   No No   Si sprays into each nostril daily   lisinopril (ZESTRIL) 40 mg tablet   No No   Sig: Take 1 tablet (40 mg total) by mouth daily   mesalamine (APRISO) 0.375 g 24 hr capsule   Yes No   metoprolol succinate (TOPROL-XL) 25 mg 24 hr tablet   No No   Sig: Take 1 tablet (25 mg total) by mouth daily Please take half a tablet per day.   nystatin (MYCOSTATIN) cream   No No   Sig: Apply topically 2 (two) times a day for 15 days   omeprazole (PriLOSEC) 20 mg delayed release capsule   Yes No   Sig: daily   predniSONE 20 mg tablet   No No   Sig: Take 2 tablets daily for 3 days then 1 tablet daily for 3 days   sucralfate (CARAFATE) 1 g tablet   Yes No   Si (two) times a day   Patient not taking: Reported on 3/5/2024      Facility-Administered Medications: None       Past Medical History:   Diagnosis Date    COVID-19 vaccine administered     J & J in 2021    Crohn disease (HCC)     Hypertension     IBS (irritable bowel syndrome)     Inflammatory bowel disease 2003    Crohns dx 2020     Kidney stone 06/2021    Lithotripsy done 7/2021    Obstruction of right ureteropelvic junction (UPJ) due to stone 6/18/2021    Obstruction of right ureteropelvic junction due to stone     PONV (postoperative nausea and vomiting)     Pt requests to be pre-medicated for N/V prior to sx    Psoriatic arthritis (HCC)     Wears glasses        Past Surgical History:   Procedure Laterality Date    CHOLECYSTECTOMY      [place] CYSTO BLADDER W/URETERAL CATHETERIZATION Right 6/18/2021    Procedure: CYSTOSCOPY RETROGRADE PYELOGRAM WITH INSERTION STENT URETERAL;  Surgeon: Oren Jay MD;  Location:  MAIN OR;  Service: Urology    LA LITHOTRIPSY XTRCORP SHOCK WAVE Right 7/16/2021    Procedure: LITHOTRIPSY EXTRACORPORAL SHOCKWAVE (ESWL);  Surgeon: Oren Jay MD;  Location:  MAIN OR;  Service: Urology    TONSILLECTOMY         Family History   Problem Relation Age of Onset    Diabetes Mother     Hypertension Father     Heart attack Father     No Known Problems Maternal Grandmother     No Known Problems Maternal Grandfather     No Known Problems Paternal Grandmother     No Known Problems Paternal Grandfather     No Known Problems Maternal Aunt     No Known Problems Maternal Aunt     Breast cancer Paternal Aunt 41     I have reviewed and agree with the history as documented.    E-Cigarette/Vaping    E-Cigarette Use Never User      E-Cigarette/Vaping Substances     Social History     Tobacco Use    Smoking status: Never     Passive exposure: Never    Smokeless tobacco: Never   Vaping Use    Vaping status: Never Used   Substance Use Topics    Alcohol use: Yes     Comment: Social drinker    Drug use: Never       Review of Systems    Physical Exam  Physical Exam  Vitals and nursing note reviewed.   Constitutional:       Appearance: She is normal weight.   HENT:      Head: Normocephalic and atraumatic.   Eyes:      Conjunctiva/sclera: Conjunctivae normal.      Pupils: Pupils are equal, round, and reactive to light.   Cardiovascular:       Rate and Rhythm: Normal rate and regular rhythm.   Pulmonary:      Effort: Pulmonary effort is normal. No respiratory distress.   Abdominal:      General: Abdomen is flat. There is no distension.   Musculoskeletal:         General: No swelling or deformity.      Cervical back: Normal range of motion and neck supple.      Comments: Swelling, mild tenderness and effusion at the left MTP, full range of motion no erythema no warmth   Skin:     General: Skin is dry.      Coloration: Skin is not jaundiced.   Neurological:      General: No focal deficit present.      Mental Status: She is alert and oriented to person, place, and time.   Psychiatric:         Mood and Affect: Mood normal.         Thought Content: Thought content normal.         Vital Signs  ED Triage Vitals [06/28/24 1551]   Temperature Pulse Respirations Blood Pressure SpO2   98 °F (36.7 °C) 93 18 (!) 185/110 98 %      Temp src Heart Rate Source Patient Position - Orthostatic VS BP Location FiO2 (%)   -- Monitor -- -- --      Pain Score       7           Vitals:    06/28/24 1551 06/28/24 1721   BP: (!) 185/110 164/79   Pulse: 93 81         Visual Acuity      ED Medications  Medications   lidocaine (PF) (XYLOCAINE-MPF) 2 % injection 5 mL (5 mL Infiltration Given by Other 6/28/24 1721)   triamcinolone acetonide (KENALOG-40) 40 mg/mL injection 40 mg (40 mg Intra-articular Given by Other 6/28/24 1720)       Diagnostic Studies  Results Reviewed       Procedure Component Value Units Date/Time    Synovial fluid, crystal [[id number]] Collected: 06/28/24 1745    Lab Status: In process Specimen: Synovial Fluid from Other Updated: 06/28/24 1809                   XR toe great min 2 views LEFT    (Results Pending)              Procedures  Arthrocentesis    Date/Time: 6/28/2024 6:38 PM    Performed by: Bharath Kearns DO  Authorized by: Bharath Kearns DO  Universal Protocol:  Consent: Verbal consent obtained.  Risks and benefits: risks, benefits and  "alternatives were discussed  Consent given by: patient  Time out: Immediately prior to procedure a \"time out\" was called to verify the correct patient, procedure, equipment, support staff and site/side marked as required.    Indications:  Joint swelling, pain and diagnostic evaluation  Body area:  Toe  Location details:  Left big toe  Local anesthesia used?: Yes    Local anesthetic:  Lidocaine 2% without epinephrine  Anesthetic total (ml):  3  Needle size:  22 G  Approach:  Medial  Aspirate amount (ml):  2  Aspirate:  Clear and yellow  Patient tolerance:  Patient tolerated the procedure well with no immediate complications           ED Course  ED Course as of 06/28/24 1842 Fri Jun 28, 2024   1716 Will waiting for Kenalog.  Order placed approximately 1 hour ago.  Nurse aware, contacted pharmacy.  They said it should be arriving the ER shortly.                               SBIRT 20yo+      Flowsheet Row Most Recent Value   Initial Alcohol Screen: US AUDIT-C     1. How often do you have a drink containing alcohol? 0 Filed at: 06/28/2024 1555   2. How many drinks containing alcohol do you have on a typical day you are drinking?  0 Filed at: 06/28/2024 1555   3a. Male UNDER 65: How often do you have five or more drinks on one occasion? 0 Filed at: 06/28/2024 1555   3b. FEMALE Any Age, or MALE 65+: How often do you have 4 or more drinks on one occassion? 0 Filed at: 06/28/2024 1555   Audit-C Score 0 Filed at: 06/28/2024 1555   LISA: How many times in the past year have you...    Used an illegal drug or used a prescription medication for non-medical reasons? Never Filed at: 06/28/2024 1555                      Medical Decision Making  43-year-old female with isolated first MTP pain.  Possible lower traumatic mechanism.  Suspect soft tissue injury versus inflammatory arthritis.  Not consistent with septic arthritis.  The atypical presentation for gout.  Pseudogout possible as well.  Not consistent with any " infection.    Will get x-ray to look for possible injury as a cause, if no injury that may consider diagnostic arthrocentesis    X-ray showed no acute osseous abnormality by my independent interpretation.  Arthrocentesis was performed to assess for gout/pseudogout.  Lab informed not enough     Problems Addressed:  Toe pain: acute illness or injury    Amount and/or Complexity of Data Reviewed  Labs: ordered. Decision-making details documented in ED Course.  Radiology: ordered and independent interpretation performed. Decision-making details documented in ED Course.    Risk  Prescription drug management.             Disposition  Final diagnoses:   Toe pain     Time reflects when diagnosis was documented in both MDM as applicable and the Disposition within this note       Time User Action Codes Description Comment    6/28/2024  5:37 PM Bharath Kearns Add [M79.676] Toe pain           ED Disposition       ED Disposition   Discharge    Condition   Stable    Date/Time   Fri Jun 28, 2024 0419    Comment   Marilin Morgan discharge to home/self care.                   Follow-up Information       Follow up With Specialties Details Why Contact Info    Maria Benton DPM Podiatry, Wound Care Schedule an appointment as soon as possible for a visit   [address] [place] [address]  [phone]              Discharge Medication List as of 6/28/2024  5:41 PM        CONTINUE these medications which have NOT CHANGED    Details   Cholecalciferol (VITAMIN D3 PO) Take 4,000 Units by mouth, Historical Med      clobetasol (TEMOVATE) 0.05 % external solution Apply topically in the morning, Starting Thu 3/24/2022, Normal      ELDERBERRY PO Take by mouth, Historical Med      fluticasone (FLONASE) 50 mcg/act nasal spray 2 sprays into each nostril daily, Starting Wed 2/28/2024, Normal      lisinopril (ZESTRIL) 40 mg tablet Take 1 tablet (40 mg total) by mouth daily, Starting Fri 5/31/2024, Until Thu 8/29/2024, Normal       mesalamine (APRISO) 0.375 g 24 hr capsule Starting Fri 1/6/2023, Historical Med      metoprolol succinate (TOPROL-XL) 25 mg 24 hr tablet Take 1 tablet (25 mg total) by mouth daily Please take half a tablet per day., Starting Fri 5/31/2024, Until Sun 6/30/2024, Normal      Mibelas 24 Fe 1-20 MG-MCG(24) CHEW Starting Wed 1/12/2022, Historical Med      Multiple Vitamin (multivitamin) tablet Take 1 tablet by mouth daily, Historical Med      nystatin (MYCOSTATIN) cream Apply topically 2 (two) times a day for 15 days, Starting Fri 4/26/2024, Until Sat 5/11/2024, Normal      omeprazole (PriLOSEC) 20 mg delayed release capsule daily, Starting Thu 7/14/2022, Historical Med      predniSONE 20 mg tablet Take 2 tablets daily for 3 days then 1 tablet daily for 3 days, Normal      Risankizumab-rzaa (SKYRIZI IV) Inject as directed, Historical Med      sucralfate (CARAFATE) 1 g tablet 2 (two) times a day, Starting Thu 1/19/2023, Historical Med                 PDMP Review       None            ED Provider  Electronically Signed by             Bharath Kearns DO  06/28/24 1456

## 2024-06-28 NOTE — DISCHARGE INSTRUCTIONS
Wear well supported footwear  The steroids should take effect in the next 2-3 days and should improve

## 2024-06-29 LAB — CRYSTALS SNV QL MICRO: NORMAL

## 2024-07-01 ENCOUNTER — TELEPHONE (OUTPATIENT)
Dept: OBGYN CLINIC | Facility: HOSPITAL | Age: 44
End: 2024-07-01

## 2024-07-01 ENCOUNTER — TELEPHONE (OUTPATIENT)
Age: 44
End: 2024-07-01

## 2024-07-01 ENCOUNTER — VBI (OUTPATIENT)
Dept: FAMILY MEDICINE CLINIC | Facility: CLINIC | Age: 44
End: 2024-07-01

## 2024-07-01 NOTE — TELEPHONE ENCOUNTER
Caller: Marilin Morgan    Doctor and/or Office: Dr. Luke    #: [phone]    Escalation: Appointment Patient would like to be added to wait list for new Dpm. Thank you

## 2024-07-01 NOTE — TELEPHONE ENCOUNTER
07/01/24 9:21 AM    Patient contacted post ED visit, first outreach attempt made. Message was left for patient to return a call to the VBI Department at [facility]: Phone [phone].    Thank you.  Francia Ortega  [facility]

## 2024-07-01 NOTE — LETTER
[facility]  [address]   [place] [address]  [phone]    Date: 07/03/24    Marilin Morgan  [address] [place] [address]    Dear Marilin:                                                                                                                                Thank you for choosing [facility] emergency department for care.  Your primary care provider wants to make sure that your ongoing medical care is being addressed. If you require follow up care as a result of your emergency department visit, there are a few things the practice would like you to know.                As part of the network's continuing commitment to caring for our patients, we have added more same day appointments and have extended office hours to meet your medical needs. After hours, on-call physicians are available via your primary care provider's main office line.               We encourage you to contact our office prior to seeking treatment to discuss your symptoms with the medical staff.  Together, we can determine the correct course of action.  A majority of non-emergent conditions such as: common cold, flu-like symptoms, fevers, strains/sprains, dislocations, minor burns, cuts and animal bites can be treated at [facility] facilities. Diagnostic testing is available at some sites.               Of course, if you are experiencing a life threatening medical emergency call 911 or proceed directly to the nearest emergency room.    Your nearest [facility] facility is conveniently located at:    [facility] [place]  [address]  [place] [address] [phone]  SKIP THE WAIT  Conveniently offered at most [facility] locations  [place] your spot online at [email or web address]/locations or on the [facility] [place]    Sincerely,    [facility]  Dept: [phone]

## 2024-07-02 NOTE — TELEPHONE ENCOUNTER
07/02/24 9:19 AM    Patient contacted post ED visit, second outreach attempt made. Message was left for patient to return a call to the VBI Department at [facility]: Phone [phone].    Thank you.  Francia Ortega  [facility]

## 2024-07-03 NOTE — TELEPHONE ENCOUNTER
07/03/24 9:49 AM    Patient contacted post ED visit, phone outreaches were unsuccessful and a MyChart letter has been sent to the patient as follow-up.    Thank you.  Francia Ortega  [facility]

## 2024-07-11 ENCOUNTER — APPOINTMENT (OUTPATIENT)
Age: 44
End: 2024-07-11
Payer: COMMERCIAL

## 2024-07-11 DIAGNOSIS — N91.2 AMENORRHEA: ICD-10-CM

## 2024-07-11 LAB
ALBUMIN SERPL BCG-MCNC: 4 G/DL (ref 3.5–5)
ALP SERPL-CCNC: 58 U/L (ref 34–104)
ALT SERPL W P-5'-P-CCNC: 12 U/L (ref 7–52)
ANION GAP SERPL CALCULATED.3IONS-SCNC: 12 MMOL/L (ref 4–13)
AST SERPL W P-5'-P-CCNC: 14 U/L (ref 13–39)
BILIRUB SERPL-MCNC: 0.42 MG/DL (ref 0.2–1)
BUN SERPL-MCNC: 15 MG/DL (ref 5–25)
CALCIUM SERPL-MCNC: 8.8 MG/DL (ref 8.4–10.2)
CHLORIDE SERPL-SCNC: 103 MMOL/L (ref 96–108)
CO2 SERPL-SCNC: 22 MMOL/L (ref 21–32)
CREAT SERPL-MCNC: 0.71 MG/DL (ref 0.6–1.3)
EST. AVERAGE GLUCOSE BLD GHB EST-MCNC: 108 MG/DL
ESTRADIOL SERPL-MCNC: 16.3 PG/ML
FSH SERPL-ACNC: 2.7 MIU/ML
GFR SERPL CREATININE-BSD FRML MDRD: 103 ML/MIN/1.73SQ M
GLUCOSE P FAST SERPL-MCNC: 95 MG/DL (ref 65–99)
HBA1C MFR BLD: 5.4 %
INSULIN SERPL-ACNC: 15.94 UIU/ML (ref 1.9–23)
LH SERPL-ACNC: 4.7 MIU/ML
POTASSIUM SERPL-SCNC: 4 MMOL/L (ref 3.5–5.3)
PROT SERPL-MCNC: 7.1 G/DL (ref 6.4–8.4)
SODIUM SERPL-SCNC: 137 MMOL/L (ref 135–147)
TSH SERPL DL<=0.05 MIU/L-ACNC: 2.67 UIU/ML (ref 0.45–4.5)

## 2024-07-11 PROCEDURE — 83002 ASSAY OF GONADOTROPIN (LH): CPT

## 2024-07-11 PROCEDURE — 82670 ASSAY OF TOTAL ESTRADIOL: CPT

## 2024-07-11 PROCEDURE — 36415 COLL VENOUS BLD VENIPUNCTURE: CPT

## 2024-07-11 PROCEDURE — 83036 HEMOGLOBIN GLYCOSYLATED A1C: CPT

## 2024-07-11 PROCEDURE — 83525 ASSAY OF INSULIN: CPT

## 2024-07-11 PROCEDURE — 80053 COMPREHEN METABOLIC PANEL: CPT

## 2024-07-11 PROCEDURE — 84443 ASSAY THYROID STIM HORMONE: CPT

## 2024-07-11 PROCEDURE — 83001 ASSAY OF GONADOTROPIN (FSH): CPT

## 2024-07-22 ENCOUNTER — TELEPHONE (OUTPATIENT)
Age: 44
End: 2024-07-22

## 2024-08-08 ENCOUNTER — TELEPHONE (OUTPATIENT)
Dept: FAMILY MEDICINE CLINIC | Facility: CLINIC | Age: 44
End: 2024-08-08

## 2024-08-29 ENCOUNTER — APPOINTMENT (OUTPATIENT)
Dept: RADIOLOGY | Facility: CLINIC | Age: 44
End: 2024-08-29
Payer: OTHER MISCELLANEOUS

## 2024-08-29 ENCOUNTER — OCCMED (OUTPATIENT)
Dept: URGENT CARE | Facility: CLINIC | Age: 44
End: 2024-08-29
Payer: OTHER MISCELLANEOUS

## 2024-08-29 ENCOUNTER — OFFICE VISIT (OUTPATIENT)
Age: 44
End: 2024-08-29
Payer: COMMERCIAL

## 2024-08-29 VITALS
BODY MASS INDEX: 32.49 KG/M2 | DIASTOLIC BLOOD PRESSURE: 99 MMHG | SYSTOLIC BLOOD PRESSURE: 146 MMHG | HEIGHT: 65 IN | HEART RATE: 99 BPM | WEIGHT: 195 LBS

## 2024-08-29 DIAGNOSIS — S09.93XA FACIAL INJURY, INITIAL ENCOUNTER: ICD-10-CM

## 2024-08-29 DIAGNOSIS — M79.676 TOE PAIN: ICD-10-CM

## 2024-08-29 DIAGNOSIS — S00.532A CONTUSION OF MOUTH: ICD-10-CM

## 2024-08-29 DIAGNOSIS — M79.672 LEFT FOOT PAIN: Primary | ICD-10-CM

## 2024-08-29 DIAGNOSIS — S00.33XA CONTUSION OF NOSE, INITIAL ENCOUNTER: Primary | ICD-10-CM

## 2024-08-29 PROCEDURE — 99283 EMERGENCY DEPT VISIT LOW MDM: CPT | Performed by: FAMILY MEDICINE

## 2024-08-29 PROCEDURE — G0382 LEV 3 HOSP TYPE B ED VISIT: HCPCS | Performed by: FAMILY MEDICINE

## 2024-08-29 PROCEDURE — 70160 X-RAY EXAM OF NASAL BONES: CPT

## 2024-08-29 PROCEDURE — 99203 OFFICE O/P NEW LOW 30 MIN: CPT

## 2024-08-29 NOTE — PROGRESS NOTES
Ambulatory Visit  Name: Marilin Morgan      : 1980      MRN: [id number]  Encounter Provider: Shawn Luke DPM  Encounter Date: 2024   Encounter department: [facility] PODIATRY [facility]    Assessment & Plan   1. Left foot pain  -     MRI foot/forefoot toes left w contrast; Future; Expected date: 2024  2. Toe pain  -     Ambulatory Referral to Podiatry  -     MRI foot/forefoot toes left w contrast; Future; Expected date: 2024    Plan  Left foot MRI ordered to r/o neuroma and sprain at left medial forefoot.  Rest, ice, and elevation  Apply a cloth covered ice pack to heel four times per day 15-30 minutes.   My personal interpretation today of the x-rays that were taken show no acute osseous abnormalities  Patient should wear supportive sneakers, compression sock to help with foot swelling  Weightbearing status as tolerated.      I personally discussed with patient at the visit today:    The diagnosis of this encounter   Possible etiologies of the diagnosis  Treatment options including advantages and disadvantages of treatment with potential risks and complications associated with these treatments  Prevention strategies and ways to decrease pain    I answered all of the patients questions.       History of Present Illness     Marilin Morgan is a 43 y.o. female who presents with left forefoot pain and swelling that has been ongoing for past couple months. Pain is worse is certain shoe gear and while weightbearing and walking. She does not recall any trauma to foot but may have stubbed great toe and forgotten. She has tried, rest, ice and change in shoe gear. 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 She does report chronic inflammation to other areas of her body.    Review of Systems  Current Outpatient Medications on File Prior to Visit   Medication Sig Dispense Refill   • Cholecalciferol (VITAMIN D3 PO) Take 4,000 Units by mouth     • ELDERBERRY PO Take by mouth     •  "fluticasone (FLONASE) 50 mcg/act nasal spray 2 sprays into each nostril daily 16 g 3   • lisinopril (ZESTRIL) 40 mg tablet Take 1 tablet (40 mg total) by mouth daily 30 tablet 5   • mesalamine (APRISO) 0.375 g 24 hr capsule      • Mibelas 24 Fe 1-20 MG-MCG(24) CHEW      • Multiple Vitamin (multivitamin) tablet Take 1 tablet by mouth daily     • omeprazole (PriLOSEC) 20 mg delayed release capsule daily     • Risankizumab-rzaa (SKYRIZI IV) Inject as directed     • clobetasol (TEMOVATE) 0.05 % external solution Apply topically in the morning (Patient not taking: Reported on 3/8/2024) 50 mL 0   • metoprolol succinate (TOPROL-XL) 25 mg 24 hr tablet Take 1 tablet (25 mg total) by mouth daily Please take half a tablet per day. 30 tablet 5   • nystatin (MYCOSTATIN) cream Apply topically 2 (two) times a day for 15 days 30 g 0   • predniSONE 20 mg tablet Take 2 tablets daily for 3 days then 1 tablet daily for 3 days 9 tablet 0   • sucralfate (CARAFATE) 1 g tablet 2 (two) times a day (Patient not taking: Reported on 3/5/2024)       No current facility-administered medications on file prior to visit.      Objective     /99 (BP Location: Right arm, Patient Position: Sitting, Cuff Size: Large)   Pulse 99   Ht 5' 5\" (1.651 m)   Wt 88.5 kg (195 lb)   BMI 32.45 kg/m²     Physical Exam  Left foot:  Vascular: Intact pedal pulses bilateral DP and PT.  Neurological: Gross protective sensation intact bilateral  Musculoskeletal: Muscle strength bilateral intact with dorsiflexion, inversion, eversion and plantarflexion.  Tenderness on palpation noted left dorsal foot lateral to 1st metatarsal head, negative judy's sign.  There is no tenderness on palpation of the Lisfranc ligament.  No tenderness with palpation at the level of the ankle medially or laterally.  No tenderness with tib-fib squeeze.  No other areas of significant discomfort or tenderness on examination.  Dermatological: No open lesions or ulcerations noted " bilateral.    Administrative Statements   I have spent a total time of 30 minutes in caring for this patient on the day of the visit/encounter including Instructions for management, Patient and family education, Documenting in the medical record, Reviewing / ordering tests, medicine, procedures  , and Obtaining or reviewing history  .

## 2024-08-29 NOTE — PROGRESS NOTES
This encounter was created for [facility] orders only .     Xray initial interpretation: nasal bones - no acute osseous abnormality  Official radiology read pending - We will only notify you if there needs to be a change in your treatment plan.

## 2024-09-03 ENCOUNTER — APPOINTMENT (OUTPATIENT)
Dept: URGENT CARE | Facility: CLINIC | Age: 44
End: 2024-09-03
Payer: OTHER MISCELLANEOUS

## 2024-09-03 PROCEDURE — 99213 OFFICE O/P EST LOW 20 MIN: CPT | Performed by: FAMILY MEDICINE

## 2024-09-05 ENCOUNTER — HOSPITAL ENCOUNTER (EMERGENCY)
Facility: HOSPITAL | Age: 44
Discharge: HOME | End: 2024-09-05
Attending: EMERGENCY MEDICINE
Payer: COMMERCIAL

## 2024-09-05 ENCOUNTER — APPOINTMENT (EMERGENCY)
Dept: CT IMAGING | Facility: HOSPITAL | Age: 44
End: 2024-09-05
Payer: COMMERCIAL

## 2024-09-05 VITALS
WEIGHT: 190 LBS | SYSTOLIC BLOOD PRESSURE: 159 MMHG | TEMPERATURE: 97.4 F | RESPIRATION RATE: 17 BRPM | DIASTOLIC BLOOD PRESSURE: 86 MMHG | HEIGHT: 60 IN | BODY MASS INDEX: 37.3 KG/M2 | OXYGEN SATURATION: 97 % | HEART RATE: 86 BPM

## 2024-09-05 DIAGNOSIS — K52.9 JEJUNITIS: Primary | ICD-10-CM

## 2024-09-05 LAB
ALBUMIN SERPL BCG-MCNC: 4.6 G/DL (ref 3.5–5)
ALP SERPL-CCNC: 56 U/L (ref 34–104)
ALT SERPL W P-5'-P-CCNC: 12 U/L (ref 7–52)
ANION GAP SERPL CALCULATED.3IONS-SCNC: 11 MMOL/L (ref 4–13)
AST SERPL W P-5'-P-CCNC: 18 U/L (ref 13–39)
BASOPHILS # BLD AUTO: 0.05 THOUSANDS/ÂΜL (ref 0–0.1)
BASOPHILS NFR BLD AUTO: 1 % (ref 0–1)
BILIRUB SERPL-MCNC: 0.52 MG/DL (ref 0.2–1)
BILIRUB UR QL STRIP: NEGATIVE
BUN SERPL-MCNC: 10 MG/DL (ref 5–25)
CALCIUM SERPL-MCNC: 9.6 MG/DL (ref 8.4–10.2)
CHLORIDE SERPL-SCNC: 101 MMOL/L (ref 96–108)
CLARITY UR: CLEAR
CO2 SERPL-SCNC: 26 MMOL/L (ref 21–32)
COLOR UR: YELLOW
CREAT SERPL-MCNC: 0.78 MG/DL (ref 0.6–1.3)
CRP SERPL QL: 12.2 MG/L
EOSINOPHIL # BLD AUTO: 0.05 THOUSAND/ÂΜL (ref 0–0.61)
EOSINOPHIL NFR BLD AUTO: 1 % (ref 0–6)
ERYTHROCYTE [DISTWIDTH] IN BLOOD BY AUTOMATED COUNT: 12.9 % (ref 11.6–15.1)
EXT PREGNANCY TEST URINE: NEGATIVE
EXT. CONTROL: NORMAL
GFR SERPL CREATININE-BSD FRML MDRD: 92 ML/MIN/1.73SQ M
GLUCOSE SERPL-MCNC: 93 MG/DL (ref 65–140)
GLUCOSE UR STRIP-MCNC: NEGATIVE MG/DL
HCT VFR BLD AUTO: 45.4 % (ref 34.8–46.1)
HGB BLD-MCNC: 14.3 G/DL (ref 11.5–15.4)
HGB UR QL STRIP.AUTO: NEGATIVE
IMM GRANULOCYTES # BLD AUTO: 0.03 THOUSAND/UL (ref 0–0.2)
IMM GRANULOCYTES NFR BLD AUTO: 0 % (ref 0–2)
KETONES UR STRIP-MCNC: NEGATIVE MG/DL
LEUKOCYTE ESTERASE UR QL STRIP: NEGATIVE
LIPASE SERPL-CCNC: 16 U/L (ref 11–82)
LYMPHOCYTES # BLD AUTO: 3.06 THOUSANDS/ÂΜL (ref 0.6–4.47)
LYMPHOCYTES NFR BLD AUTO: 33 % (ref 14–44)
MCH RBC QN AUTO: 27.8 PG (ref 26.8–34.3)
MCHC RBC AUTO-ENTMCNC: 31.5 G/DL (ref 31.4–37.4)
MCV RBC AUTO: 88 FL (ref 82–98)
MONOCYTES # BLD AUTO: 0.69 THOUSAND/ÂΜL (ref 0.17–1.22)
MONOCYTES NFR BLD AUTO: 7 % (ref 4–12)
NEUTROPHILS # BLD AUTO: 5.52 THOUSANDS/ÂΜL (ref 1.85–7.62)
NEUTS SEG NFR BLD AUTO: 58 % (ref 43–75)
NITRITE UR QL STRIP: NEGATIVE
NRBC BLD AUTO-RTO: 0 /100 WBCS
PH UR STRIP.AUTO: 6 [PH]
PLATELET # BLD AUTO: 398 THOUSANDS/UL (ref 149–390)
PMV BLD AUTO: 9.2 FL (ref 8.9–12.7)
POTASSIUM SERPL-SCNC: 4 MMOL/L (ref 3.5–5.3)
PROT SERPL-MCNC: 7.5 G/DL (ref 6.4–8.4)
PROT UR STRIP-MCNC: NEGATIVE MG/DL
RBC # BLD AUTO: 5.15 MILLION/UL (ref 3.81–5.12)
SODIUM SERPL-SCNC: 138 MMOL/L (ref 135–147)
SP GR UR STRIP.AUTO: 1.02
UROBILINOGEN UR QL STRIP.AUTO: 0.2 E.U./DL
WBC # BLD AUTO: 9.4 THOUSAND/UL (ref 4.31–10.16)

## 2024-09-05 PROCEDURE — 80053 COMPREHEN METABOLIC PANEL: CPT | Performed by: EMERGENCY MEDICINE

## 2024-09-05 PROCEDURE — 86140 C-REACTIVE PROTEIN: CPT

## 2024-09-05 PROCEDURE — 74177 CT ABD & PELVIS W/CONTRAST: CPT

## 2024-09-05 PROCEDURE — 99284 EMERGENCY DEPT VISIT MOD MDM: CPT

## 2024-09-05 PROCEDURE — 83690 ASSAY OF LIPASE: CPT | Performed by: EMERGENCY MEDICINE

## 2024-09-05 PROCEDURE — 81003 URINALYSIS AUTO W/O SCOPE: CPT | Performed by: EMERGENCY MEDICINE

## 2024-09-05 PROCEDURE — 99214 OFFICE O/P EST MOD 30 MIN: CPT | Performed by: INTERNAL MEDICINE

## 2024-09-05 PROCEDURE — 85025 COMPLETE CBC W/AUTO DIFF WBC: CPT | Performed by: EMERGENCY MEDICINE

## 2024-09-05 PROCEDURE — 99285 EMERGENCY DEPT VISIT HI MDM: CPT | Performed by: EMERGENCY MEDICINE

## 2024-09-05 PROCEDURE — 81025 URINE PREGNANCY TEST: CPT | Performed by: EMERGENCY MEDICINE

## 2024-09-05 PROCEDURE — 36415 COLL VENOUS BLD VENIPUNCTURE: CPT | Performed by: EMERGENCY MEDICINE

## 2024-09-05 RX ORDER — DULOXETIN HYDROCHLORIDE 30 MG/1
30 CAPSULE, DELAYED RELEASE ORAL DAILY
COMMUNITY

## 2024-09-05 RX ADMIN — IOHEXOL 100 ML: 350 INJECTION, SOLUTION INTRAVENOUS at 13:53

## 2024-09-05 NOTE — ED PROVIDER NOTES
"History  Chief Complaint   Patient presents with    Abdominal Pain     Hx crohns. Pt reports change in bowel pattern x4 days, reports only passing \"shoestring\" amounts. No BM today which is unusual for pt.  Pt called GI with symptoms and was told to come to ED. Pt c/o abdominal pain now radiating to back      Patient is a 43-year-old female with history of Crohn's disease that presents for evaluation of abdominal pain.  She has upper abdominal pain that is relatively mild along with a change in her stools including more narrow stool caliber recently.  Patient otherwise denies nausea vomiting.  Pain is mild history refusing anything for pain at this time.  She denies any blood in her stool.  She has not been on steroids at all recently.        Prior to Admission Medications   Prescriptions Last Dose Informant Patient Reported? Taking?   Cholecalciferol (VITAMIN D3 PO) 2024 Self Yes Yes   Sig: Take 4,000 Units by mouth   DULoxetine (CYMBALTA) 30 mg delayed release capsule 2024  Yes Yes   Sig: Take 30 mg by mouth daily   ELDERBERRY PO 2024 Self Yes Yes   Sig: Take by mouth   Mibelas 24 Fe 1-20 MG-MCG(24) CHEW 2024 Self Yes Yes   Multiple Vitamin (multivitamin) tablet 2024 Self Yes Yes   Sig: Take 1 tablet by mouth daily   Risankizumab-rzaa (SKYRIZI IV) More than a month Self Yes No   Sig: Inject as directed   clobetasol (TEMOVATE) 0.05 % external solution  Self No No   Sig: Apply topically in the morning   Patient not taking: Reported on 3/8/2024   fluticasone (FLONASE) 50 mcg/act nasal spray 2024 Self No Yes   Si sprays into each nostril daily   lisinopril (ZESTRIL) 40 mg tablet 2024 Self No Yes   Sig: Take 1 tablet (40 mg total) by mouth daily   mesalamine (APRISO) 0.375 g 24 hr capsule 2024 Self Yes Yes   metoprolol succinate (TOPROL-XL) 25 mg 24 hr tablet 2024  No Yes   Sig: Take 1 tablet (25 mg total) by mouth daily Please take half a tablet per day.   nystatin (MYCOSTATIN) " cream   No No   Sig: Apply topically 2 (two) times a day for 15 days   omeprazole (PriLOSEC) 20 mg delayed release capsule 2024 Self Yes Yes   Sig: daily   predniSONE 20 mg tablet  Self No No   Sig: Take 2 tablets daily for 3 days then 1 tablet daily for 3 days   sucralfate (CARAFATE) 1 g tablet Not Taking Self Yes No   Si (two) times a day   Patient not taking: Reported on 3/5/2024      Facility-Administered Medications: None       Past Medical History:   Diagnosis Date    COVID-19 vaccine administered     J & J in 2021    Crohn disease (HCC)     Hypertension     IBS (irritable bowel syndrome)     Inflammatory bowel disease     Crohns dx 2020    Kidney stone 2021    Lithotripsy done 2021    Obstruction of right ureteropelvic junction (UPJ) due to stone 2021    Obstruction of right ureteropelvic junction due to stone     PONV (postoperative nausea and vomiting)     Pt requests to be pre-medicated for N/V prior to sx    Psoriatic arthritis (HCC)     Wears glasses        Past Surgical History:   Procedure Laterality Date    CHOLECYSTECTOMY      NH CYSTO BLADDER W/URETERAL CATHETERIZATION Right 2021    Procedure: CYSTOSCOPY RETROGRADE PYELOGRAM WITH INSERTION STENT URETERAL;  Surgeon: Oren Jay MD;  Location:  MAIN OR;  Service: Urology    NH LITHOTRIPSY XTRCORP SHOCK WAVE Right 2021    Procedure: LITHOTRIPSY EXTRACORPORAL SHOCKWAVE (ESWL);  Surgeon: Oren Jay MD;  Location:  MAIN OR;  Service: Urology    TONSILLECTOMY         Family History   Problem Relation Age of Onset    Diabetes Mother     Hypertension Father     Heart attack Father     No Known Problems Maternal Grandmother     No Known Problems Maternal Grandfather     No Known Problems Paternal Grandmother     No Known Problems Paternal Grandfather     No Known Problems Maternal Aunt     No Known Problems Maternal Aunt     Breast cancer Paternal Aunt 41     I have reviewed and agree with the history as  documented.    E-Cigarette/Vaping    E-Cigarette Use Never User      E-Cigarette/Vaping Substances     Social History     Tobacco Use    Smoking status: Never     Passive exposure: Never    Smokeless tobacco: Never   Vaping Use    Vaping status: Never Used   Substance Use Topics    Alcohol use: Yes     Comment: Social drinker    Drug use: Never       Review of Systems   Constitutional:  Negative for fever.   HENT:  Negative for sore throat.    Respiratory:  Negative for shortness of breath.    Cardiovascular:  Negative for chest pain.   Gastrointestinal:  Positive for abdominal pain.   Genitourinary:  Negative for dysuria.   Musculoskeletal:  Negative for back pain.   Skin:  Negative for rash.   Neurological:  Negative for light-headedness.   Psychiatric/Behavioral:  Negative for agitation.    All other systems reviewed and are negative.      Physical Exam  Physical Exam  Vitals reviewed.   Constitutional:       General: She is not in acute distress.     Appearance: She is well-developed.   HENT:      Head: Normocephalic.   Eyes:      Pupils: Pupils are equal, round, and reactive to light.   Cardiovascular:      Rate and Rhythm: Normal rate and regular rhythm.      Heart sounds: Normal heart sounds.   Pulmonary:      Effort: Pulmonary effort is normal.      Breath sounds: Normal breath sounds.   Abdominal:      General: Bowel sounds are normal. There is no distension.      Palpations: Abdomen is soft.      Tenderness: There is abdominal tenderness. There is no guarding.      Comments: Mild upper abdominal tenderness without rebound tenderness or guarding   Musculoskeletal:         General: No tenderness or deformity. Normal range of motion.      Cervical back: Normal range of motion and neck supple.   Skin:     General: Skin is warm and dry.      Capillary Refill: Capillary refill takes less than 2 seconds.   Neurological:      Mental Status: She is alert and oriented to person, place, and time.      Cranial Nerves:  No cranial nerve deficit.      Sensory: No sensory deficit.   Psychiatric:         Behavior: Behavior normal.         Thought Content: Thought content normal.         Judgment: Judgment normal.         Vital Signs  ED Triage Vitals   Temperature Pulse Respirations Blood Pressure SpO2   09/05/24 1253 09/05/24 1253 09/05/24 1253 09/05/24 1253 09/05/24 1253   (!) 97.4 °F (36.3 °C) 93 18 163/100 98 %      Temp Source Heart Rate Source Patient Position - Orthostatic VS BP Location FiO2 (%)   09/05/24 1253 09/05/24 1300 -- -- --   Tympanic Monitor         Pain Score       09/05/24 1253       7           Vitals:    09/05/24 1400 09/05/24 1430 09/05/24 1530 09/05/24 1630   BP: 168/76 161/74 141/84 159/86   Pulse: 87 88 87 86         Visual Acuity      ED Medications  Medications   iohexol (OMNIPAQUE) 350 MG/ML injection (MULTI-DOSE) 100 mL (100 mL Intravenous Given 9/5/24 1353)       Diagnostic Studies  Results Reviewed       Procedure Component Value Units Date/Time    C-reactive protein [[id number]]  (Abnormal) Collected: 09/05/24 1639    Lab Status: Final result Specimen: Blood from Arm, Right Updated: 09/05/24 1700     CRP 12.2 mg/L     Stool Enteric Bacterial Panel by PCR [[id number]]     Lab Status: No result Specimen: Stool from Rectum     Calprotectin,Fecal [[id number]]     Lab Status: No result Specimen: Stool     Giardia antigen [[id number]]     Lab Status: No result Specimen: Stool from Per Rectum     CMP [[id number]] Collected: 09/05/24 1258    Lab Status: Final result Specimen: Blood from Arm, Right Updated: 09/05/24 1341     Sodium 138 mmol/L      Potassium 4.0 mmol/L      Chloride 101 mmol/L      CO2 26 mmol/L      ANION GAP 11 mmol/L      BUN 10 mg/dL      Creatinine 0.78 mg/dL      Glucose 93 mg/dL      Calcium 9.6 mg/dL      AST 18 U/L      ALT 12 U/L      Alkaline Phosphatase 56 U/L      Total Protein 7.5 g/dL      Albumin 4.6 g/dL      Total Bilirubin 0.52 mg/dL      eGFR 92 ml/min/1.73sq m     Narrative:       National Kidney Disease Foundation guidelines for Chronic Kidney Disease (CKD):     Stage 1 with normal or high GFR (GFR > 90 mL/min/1.73 square meters)    Stage 2 Mild CKD (GFR = 60-89 mL/min/1.73 square meters)    Stage 3A Moderate CKD (GFR = 45-59 mL/min/1.73 square meters)    Stage 3B Moderate CKD (GFR = 30-44 mL/min/1.73 square meters)    Stage 4 Severe CKD (GFR = 15-29 mL/min/1.73 square meters)    Stage 5 End Stage CKD (GFR <15 mL/min/1.73 square meters)  Note: GFR calculation is accurate only with a steady state creatinine    Lipase [[id number]]  (Normal) Collected: 09/05/24 1258    Lab Status: Final result Specimen: Blood from Arm, Right Updated: 09/05/24 1341     Lipase 16 u/L     CBC and differential [[id number]]  (Abnormal) Collected: 09/05/24 1258    Lab Status: Final result Specimen: Blood from Arm, Right Updated: 09/05/24 1315     WBC 9.40 Thousand/uL      RBC 5.15 Million/uL      Hemoglobin 14.3 g/dL      Hematocrit 45.4 %      MCV 88 fL      MCH 27.8 pg      MCHC 31.5 g/dL      RDW 12.9 %      MPV 9.2 fL      Platelets 398 Thousands/uL      nRBC 0 /100 WBCs      Segmented % 58 %      Immature Grans % 0 %      Lymphocytes % 33 %      Monocytes % 7 %      Eosinophils Relative 1 %      Basophils Relative 1 %      Absolute Neutrophils 5.52 Thousands/µL      Absolute Immature Grans 0.03 Thousand/uL      Absolute Lymphocytes 3.06 Thousands/µL      Absolute Monocytes 0.69 Thousand/µL      Eosinophils Absolute 0.05 Thousand/µL      Basophils Absolute 0.05 Thousands/µL     UA w Reflex to Microscopic w Reflex to Culture [[id number]]  (Normal) Collected: 09/05/24 1258    Lab Status: Final result Specimen: Urine, Clean Catch Updated: 09/05/24 1312     Color, UA Yellow     Clarity, UA Clear     Specific Gravity, UA 1.020     pH, UA 6.0     Leukocytes, UA Negative     Nitrite, UA Negative     Protein, UA Negative mg/dl      Glucose, UA Negative mg/dl      Ketones, UA Negative mg/dl      Urobilinogen, UA 0.2  E.U./dl      Bilirubin, UA Negative     Occult Blood, UA Negative    POCT pregnancy, urine [[id number]]  (Normal) Resulted: 09/05/24 1258    Lab Status: Final result Updated: 09/05/24 1259     EXT Preg Test, Ur Negative     Control Valid                   CT abdomen pelvis with contrast   Final Result by Karin Johnson MD (09/05 1505)      Findings of jejunitis. Most compatible with active Crohn's inflammation.      Workstation performed: [id number]                    Procedures  Procedures         ED Course                                 SBIRT 20yo+      Flowsheet Row Most Recent Value   Initial Alcohol Screen: US AUDIT-C     1. How often do you have a drink containing alcohol? 0 Filed at: 09/05/2024 1256   2. How many drinks containing alcohol do you have on a typical day you are drinking?  0 Filed at: 09/05/2024 1256   3a. Male UNDER 65: How often do you have five or more drinks on one occasion? 0 Filed at: 09/05/2024 1256   3b. FEMALE Any Age, or MALE 65+: How often do you have 4 or more drinks on one occassion? 0 Filed at: 09/05/2024 1256   Audit-C Score 0 Filed at: 09/05/2024 1256   LISA: How many times in the past year have you...    Used an illegal drug or used a prescription medication for non-medical reasons? Never Filed at: 09/05/2024 1256                      Medical Decision Making  Patient is a 43-year-old female presents for evaluation of likely Crohn's flare found to have jejunitis.  Discussed with GI, plan to rule out infection with stool studies.  Patient can go here, will plan for outpatient stool studies and outpatient follow-up with GI.  Blood work and imaging reviewed.    Amount and/or Complexity of Data Reviewed  Labs: ordered.  Radiology: ordered.    Risk  Prescription drug management.                 Disposition  Final diagnoses:   Jejunitis     Time reflects when diagnosis was documented in both MDM as applicable and the Disposition within this note       Time User Action Codes Description  Comment    9/5/2024  3:51 PM George Vigil Add [K52.9] Jejunitis           ED Disposition       ED Disposition   Discharge    Condition   Stable    Date/Time   Thu Sep 5, 2024 1623    Comment   Marilin Morgan discharge to home/self care.                   Follow-up Information       Follow up With Specialties Details Why Contact Info Additional Information    [facility] Emergency Department Emergency Medicine  If symptoms worsen [address] [facility] [address] [phone] [facility] Emergency Department, [address], [place], [place] [address]            Discharge Medication List as of 9/5/2024  4:24 PM        CONTINUE these medications which have NOT CHANGED    Details   Cholecalciferol (VITAMIN D3 PO) Take 4,000 Units by mouth, Historical Med      DULoxetine (CYMBALTA) 30 mg delayed release capsule Take 30 mg by mouth daily, Historical Med      ELDERBERRY PO Take by mouth, Historical Med      fluticasone (FLONASE) 50 mcg/act nasal spray 2 sprays into each nostril daily, Starting Wed 2/28/2024, Normal      lisinopril (ZESTRIL) 40 mg tablet Take 1 tablet (40 mg total) by mouth daily, Starting Fri 5/31/2024, Until Thu 9/5/2024, Normal      mesalamine (APRISO) 0.375 g 24 hr capsule Starting Fri 1/6/2023, Historical Med      metoprolol succinate (TOPROL-XL) 25 mg 24 hr tablet Take 1 tablet (25 mg total) by mouth daily Please take half a tablet per day., Starting Fri 5/31/2024, Until Thu 9/5/2024, Normal      Mibelas 24 Fe 1-20 MG-MCG(24) CHEW Starting Wed 1/12/2022, Historical Med      Multiple Vitamin (multivitamin) tablet Take 1 tablet by mouth daily, Historical Med      omeprazole (PriLOSEC) 20 mg delayed release capsule daily, Starting Thu 7/14/2022, Historical Med      clobetasol (TEMOVATE) 0.05 % external solution Apply topically in the morning, Starting Thu 3/24/2022, Normal      nystatin (MYCOSTATIN) cream Apply topically 2 (two) times a day  for 15 days, Starting Fri 4/26/2024, Until Sat 5/11/2024, Normal      predniSONE 20 mg tablet Take 2 tablets daily for 3 days then 1 tablet daily for 3 days, Normal      Risankizumab-rzaa (SKYRIZI IV) Inject as directed, Historical Med      sucralfate (CARAFATE) 1 g tablet 2 (two) times a day, Starting Thu 1/19/2023, Historical Med             Outpatient Discharge Orders   Stool Enteric Bacterial Panel by PCR   Standing Status: Future Standing Exp. Date: 09/05/25     Giardia antigen   Standing Status: Future Standing Exp. Date: 09/05/25     Calprotectin,Fecal   Standing Status: Future Standing Exp. Date: 09/05/25       PDMP Review       None            ED Provider  Electronically Signed by             George Vigil MD  09/05/24 1925       George Vigil MD  09/05/24 1925

## 2024-09-05 NOTE — Clinical Note
Marilin Morgan was seen and treated in our emergency department on 9/5/2024.    No restrictions            Diagnosis:     Marilin  may return to work on return date.    She may return on this date: 09/09/2024         If you have any questions or concerns, please don't hesitate to call.      Silvia Javier RN    ______________________________           _______________          _______________  Hospital Representative                              Date                                Time

## 2024-09-05 NOTE — CONSULTS
Consultation - [facility]  Marilin Morgan 43 y.o. female MRN: [id number]  Unit/Bed#: ED 21 Encounter: [id number]        Inpatient consult to gastroenterology  Consult performed by: Marva Abdullahi MD  Consult ordered by: George Vigli MD          Reason for Consult / Principal Problem:     Jejunitis      ASSESSMENT AND PLAN:      Marilin Morgan is a 43 y.o. old female with PMH of Crohn's disease, IBS, psoriatic arthritis presents to the ED with complaints of abdominal pain with radiation to the back and change in the bowel pattern/habits.  Afebrile and hemodynamically stable.  No hematochezia or diarrhea, fever or chills, nausea or vomiting.  Normal CBC and CMP.  CT abdomen and pelvis with findings concerning for jejunitis    # Crohn's disease  #Jejunitis  #Abdominal pain     Plan  - Recommend to check CRP, fecal calprotectin and stool studies to rule out infectious etiology of her symptoms  - If they all returne negative, recommend close follow-up/appointment with her IBD provider at [facility] as they may consider consider a course of steroids  - Of note, notes, they are currently in the process of decreasing the interval of her Skyrizi from every 8 weeks to every 4 to 6 weeks.    Rest of care per primary team.  Thank you for this consultation.   ______________________________________________________________________    HPI:  Marilin Morgan is a 43-year-old female with a past medical history of Crohn's disease, IBS, psoriatic arthritis presents to the ED with complaints of abdominal pain with radiation to the back and change in the bowel pattern/habits.    Dx in 2020. ED with abdominal pain. Found to have epiploic appendicitis. S/p colonoscopy when she was dx with Crohn's disease. Initially started on mesalamine. A course of Ortikos but had side effects such as moon faces, etc. Started Entyvio q6wks then q4wks but still had symptoms. Thus, biologics changed to Skyrizi q8wks in May 2023.  Colonoscopy in 1/2024 which showed remission. But had symptoms coming back prior to next dose of Skyrizi. Last Skyrizi was 8/1/2024. Can't recall her last flare. Perhaps in July 2023. Never had bowel resection. Provider at [facility] Chen Hope PA-C     No BRBPR but some mucus per rectum. Loose BM over the weekend along with urgency. Mild abdominal pain around her umbilicus. No BM today but had small amounts yesterday. Reports smaller caliber of her BM. Baseline BM: 3 formed BMs daily.  No sick contacts or recent travel. No NSAID use. No fevers or chills.      CBC and CMP was unremarkable for any leukocytosis, electrolytes abnormalities, elevated liver enzymes.  Lipase within normal limits.  CT abdomen and pelvis showed findings of jejunitis compatible with active Crohn's inflammation.      Colonoscopy January 2024 showed normal mucosa with diverticulosis    REVIEW OF SYSTEMS:    Review of Systems   See above    Historical Information   Past Medical History:   Diagnosis Date    COVID-19 vaccine administered     J & J in March 2021    Crohn disease (HCC)     Hypertension     IBS (irritable bowel syndrome)     Inflammatory bowel disease 2003    Crohns dx 11/2020    Kidney stone 06/2021    Lithotripsy done 7/2021    Obstruction of right ureteropelvic junction (UPJ) due to stone 6/18/2021    Obstruction of right ureteropelvic junction due to stone     PONV (postoperative nausea and vomiting)     Pt requests to be pre-medicated for N/V prior to sx    Psoriatic arthritis (HCC)     Wears glasses      Past Surgical History:   Procedure Laterality Date    CHOLECYSTECTOMY      HI CYSTO BLADDER W/URETERAL CATHETERIZATION Right 6/18/2021    Procedure: CYSTOSCOPY RETROGRADE PYELOGRAM WITH INSERTION STENT URETERAL;  Surgeon: Oren Jay MD;  Location: [facility] OR;  Service: Urology    HI LITHOTRIPSY XTRCORP SHOCK WAVE Right 7/16/2021    Procedure: LITHOTRIPSY EXTRACORPORAL SHOCKWAVE (ESWL);  Surgeon: Oren Jay MD;   Location: [facility];  Service: Urology    TONSILLECTOMY       Social History   Social History     Substance and Sexual Activity   Alcohol Use Yes    Comment: Social drinker     Social History     Substance and Sexual Activity   Drug Use Never     Social History     Tobacco Use   Smoking Status Never    Passive exposure: Never   Smokeless Tobacco Never     Family History   Problem Relation Age of Onset    Diabetes Mother     Hypertension Father     Heart attack Father     No Known Problems Maternal Grandmother     No Known Problems Maternal Grandfather     No Known Problems Paternal Grandmother     No Known Problems Paternal Grandfather     No Known Problems Maternal Aunt     No Known Problems Maternal Aunt     Breast cancer Paternal Aunt 41       Meds/Allergies     Not in a hospital admission.  No current facility-administered medications for this encounter.       No Known Allergies        Objective     Blood pressure 141/84, pulse 87, temperature (!) 97.4 °F (36.3 °C), temperature source Tympanic, resp. rate 17, height 5' (1.524 m), weight 86.2 kg (190 lb), last menstrual period 08/31/2024, SpO2 97%, not currently breastfeeding. Body mass index is 37.11 kg/m².    No intake or output data in the 24 hours ending 09/05/24 1629      PHYSICAL EXAM:      Physical Exam  Vitals and nursing note reviewed.   Constitutional:       General: She is not in acute distress.     Appearance: She is well-developed.   HENT:      Head: Normocephalic and atraumatic.   Eyes:      Conjunctiva/sclera: Conjunctivae normal.   Cardiovascular:      Rate and Rhythm: Normal rate and regular rhythm.      Heart sounds: No murmur heard.  Pulmonary:      Effort: Pulmonary effort is normal. No respiratory distress.      Breath sounds: Normal breath sounds.   Abdominal:      Palpations: Abdomen is soft.      Tenderness: There is no abdominal tenderness. There is no guarding or rebound.      Comments: Very Mild diffuse tenderness to deep palpation    Musculoskeletal:         General: No swelling.      Cervical back: Neck supple.   Skin:     General: Skin is warm and dry.      Capillary Refill: Capillary refill takes less than 2 seconds.   Neurological:      Mental Status: She is alert.   Psychiatric:         Mood and Affect: Mood normal.          Lab Results:   Admission on 09/05/2024   Component Date Value    WBC 09/05/2024 9.40     RBC 09/05/2024 5.15 (H)     Hemoglobin 09/05/2024 14.3     Hematocrit 09/05/2024 45.4     MCV 09/05/2024 88     MCH 09/05/2024 27.8     MCHC 09/05/2024 31.5     RDW 09/05/2024 12.9     MPV 09/05/2024 9.2     Platelets 09/05/2024 398 (H)     nRBC 09/05/2024 0     Segmented % 09/05/2024 58     Immature Grans % 09/05/2024 0     Lymphocytes % 09/05/2024 33     Monocytes % 09/05/2024 7     Eosinophils Relative 09/05/2024 1     Basophils Relative 09/05/2024 1     Absolute Neutrophils 09/05/2024 5.52     Absolute Immature Grans 09/05/2024 0.03     Absolute Lymphocytes 09/05/2024 3.06     Absolute Monocytes 09/05/2024 0.69     Eosinophils Absolute 09/05/2024 0.05     Basophils Absolute 09/05/2024 0.05     Sodium 09/05/2024 138     Potassium 09/05/2024 4.0     Chloride 09/05/2024 101     CO2 09/05/2024 26     ANION GAP 09/05/2024 11     BUN 09/05/2024 10     Creatinine 09/05/2024 0.78     Glucose 09/05/2024 93     Calcium 09/05/2024 9.6     AST 09/05/2024 18     ALT 09/05/2024 12     Alkaline Phosphatase 09/05/2024 56     Total Protein 09/05/2024 7.5     Albumin 09/05/2024 4.6     Total Bilirubin 09/05/2024 0.52     eGFR 09/05/2024 92     Lipase 09/05/2024 16     Color, UA 09/05/2024 Yellow     Clarity, UA 09/05/2024 Clear     Specific Gravity, UA 09/05/2024 1.020     pH, UA 09/05/2024 6.0     Leukocytes, UA 09/05/2024 Negative     Nitrite, UA 09/05/2024 Negative     Protein, UA 09/05/2024 Negative     Glucose, UA 09/05/2024 Negative     Ketones, UA 09/05/2024 Negative     Urobilinogen, UA 09/05/2024 0.2     Bilirubin, UA 09/05/2024  Negative     Occult Blood, UA 09/05/2024 Negative     EXT Preg Test, Ur 09/05/2024 Negative     Control 09/05/2024 Valid        Imaging Studies: I have personally reviewed pertinent imaging studies.    Marva Abdullahi MD  Gastroenterology Fellow  Available via EPIC Secure chat  9/5/2024 4:29 PM

## 2024-09-06 ENCOUNTER — VBI (OUTPATIENT)
Dept: FAMILY MEDICINE CLINIC | Facility: CLINIC | Age: 44
End: 2024-09-06

## 2024-09-06 ENCOUNTER — APPOINTMENT (OUTPATIENT)
Dept: LAB | Facility: HOSPITAL | Age: 44
End: 2024-09-06
Payer: COMMERCIAL

## 2024-09-06 DIAGNOSIS — K52.9 JEJUNITIS: ICD-10-CM

## 2024-09-06 PROCEDURE — 83993 ASSAY FOR CALPROTECTIN FECAL: CPT

## 2024-09-06 PROCEDURE — 87505 NFCT AGENT DETECTION GI: CPT

## 2024-09-06 NOTE — TELEPHONE ENCOUNTER
09/06/24 10:43 AM    Patient contacted post ED visit, [facility] department spoke with patient/caregiver and outreach questions were declined.    Thank you.  Parker Morrell MA  [facility]

## 2024-09-07 LAB
C COLI+JEJUNI TUF STL QL NAA+PROBE: NEGATIVE
EC STX1+STX2 GENES STL QL NAA+PROBE: NEGATIVE
G LAMBLIA AG STL QL IA: NEGATIVE
SALMONELLA SP SPAO STL QL NAA+PROBE: NEGATIVE
SHIGELLA SP+EIEC IPAH STL QL NAA+PROBE: NEGATIVE

## 2024-09-08 ENCOUNTER — HOSPITAL ENCOUNTER (OUTPATIENT)
Dept: MRI IMAGING | Facility: HOSPITAL | Age: 44
Discharge: HOME | End: 2024-09-08
Payer: COMMERCIAL

## 2024-09-08 DIAGNOSIS — M79.672 LEFT FOOT PAIN: ICD-10-CM

## 2024-09-08 DIAGNOSIS — M79.676 TOE PAIN: ICD-10-CM

## 2024-09-08 PROCEDURE — A9585 GADOBUTROL INJECTION: HCPCS

## 2024-09-08 PROCEDURE — 73720 MRI LWR EXTREMITY W/O&W/DYE: CPT

## 2024-09-08 RX ORDER — GADOBUTROL 604.72 MG/ML
8 INJECTION INTRAVENOUS
Status: COMPLETED | OUTPATIENT
Start: 2024-09-08 | End: 2024-09-08

## 2024-09-08 RX ADMIN — GADOBUTROL 8 ML: 604.72 INJECTION INTRAVENOUS at 09:00

## 2024-09-09 LAB — CALPROTECTIN STL-MCNC: 31.4 ÂΜG/G

## 2024-09-11 ENCOUNTER — APPOINTMENT (OUTPATIENT)
Dept: URGENT CARE | Facility: CLINIC | Age: 44
End: 2024-09-11
Payer: OTHER MISCELLANEOUS

## 2024-09-11 PROCEDURE — 99213 OFFICE O/P EST LOW 20 MIN: CPT | Performed by: PHYSICIAN ASSISTANT

## 2024-09-14 ENCOUNTER — APPOINTMENT (OUTPATIENT)
Dept: LAB | Facility: HOSPITAL | Age: 44
End: 2024-09-14

## 2024-09-14 DIAGNOSIS — K50.019: ICD-10-CM

## 2024-09-20 ENCOUNTER — TELEPHONE (OUTPATIENT)
Age: 44
End: 2024-09-20

## 2024-09-20 NOTE — TELEPHONE ENCOUNTER
Caller: Marilin Morgan    Doctor and/or Office: Dr. Luke/Amanda    #: [phone]    Escalation: Care/Asking for MRI results and calling to be seen sooner than 10/3 as lump is bigger and painful and swollen. I have nothing to offer before her scheduled appt. Please call back and advise. Thanks

## 2024-09-23 NOTE — TELEPHONE ENCOUNTER
Patient called back she said she is in a lot of pain and is not sure what to do. She asked if someone could please get back to her, Thank you

## 2024-09-30 ENCOUNTER — HOSPITAL ENCOUNTER (OUTPATIENT)
Dept: MAMMOGRAPHY | Facility: HOSPITAL | Age: 44
Discharge: HOME | End: 2024-09-30
Attending: SPECIALIST
Payer: COMMERCIAL

## 2024-09-30 DIAGNOSIS — Z12.31 ENCOUNTER FOR SCREENING MAMMOGRAM FOR MALIGNANT NEOPLASM OF BREAST: ICD-10-CM

## 2024-09-30 PROCEDURE — 77067 SCR MAMMO BI INCL CAD: CPT

## 2024-09-30 PROCEDURE — 77063 BREAST TOMOSYNTHESIS BI: CPT

## 2024-10-18 ENCOUNTER — TELEPHONE (OUTPATIENT)
Age: 44
End: 2024-10-18

## 2024-10-18 ENCOUNTER — OFFICE VISIT (OUTPATIENT)
Dept: FAMILY MEDICINE CLINIC | Facility: CLINIC | Age: 44
End: 2024-10-18
Payer: COMMERCIAL

## 2024-10-18 VITALS
TEMPERATURE: 97.8 F | BODY MASS INDEX: 38.87 KG/M2 | HEART RATE: 101 BPM | WEIGHT: 198 LBS | OXYGEN SATURATION: 97 % | HEIGHT: 60 IN | DIASTOLIC BLOOD PRESSURE: 89 MMHG | SYSTOLIC BLOOD PRESSURE: 139 MMHG

## 2024-10-18 DIAGNOSIS — K50.90 CROHN'S DISEASE WITHOUT COMPLICATION, UNSPECIFIED GASTROINTESTINAL TRACT LOCATION (HCC): ICD-10-CM

## 2024-10-18 DIAGNOSIS — M79.672 LEFT FOOT PAIN: Primary | ICD-10-CM

## 2024-10-18 PROCEDURE — 99213 OFFICE O/P EST LOW 20 MIN: CPT | Performed by: FAMILY MEDICINE

## 2024-10-18 RX ORDER — ALLOPURINOL 100 MG/1
100 TABLET ORAL DAILY
Qty: 30 TABLET | Refills: 6 | Status: SHIPPED | OUTPATIENT
Start: 2024-10-18

## 2024-10-18 NOTE — TELEPHONE ENCOUNTER
Pt stated her left foot is swollen was seen in the er f/u with podiatry.pt was prescribe colchicine for gout and arthritis pt has a history of crohn's disease ask to check with GI.said no can't take medication can course stomach bleed.GI want to prescribe Allopurinol and since its a long term drug pt was ask to speak with her pcp first pt foot is still swollen and might need to have surgery please advise [phone]

## 2024-10-18 NOTE — PROGRESS NOTES
Assessment/Plan:    No problem-specific Assessment & Plan notes found for this encounter.       Diagnoses and all orders for this visit:    Left foot pain  -     allopurinol (ZYLOPRIM) 100 mg tablet; Take 1 tablet (100 mg total) by mouth daily    Crohn's disease without complication, unspecified gastrointestinal tract location (HCC)          PHQ-2/9 Depression Screening                Subjective:      Patient ID: Marilin Morgan is a 43 y.o. female.    Pt complains of left foot swelling for a few months, there banged her foot prior to this, pt went to the ER and had a negative x-ray and negative serotesting, pt saw podiatry and had an MRI, pt had been treated with steroids which did not help        The following portions of the patient's history were reviewed and updated as appropriate: allergies, current medications, past family history, past medical history, past social history, past surgical history, and problem list.    Review of Systems   Constitutional:  Negative for chills, fatigue and fever.   Respiratory:  Negative for shortness of breath.          Objective:    /89   Pulse 101   Temp 97.8 °F (36.6 °C) (Tympanic)   Ht 5' (1.524 m)   Wt 89.8 kg (198 lb)   SpO2 97%   BMI 38.67 kg/m²      Physical Exam  Vitals and nursing note reviewed.   Constitutional:       General: She is not in acute distress.     Appearance: Normal appearance. She is not ill-appearing, toxic-appearing or diaphoretic.   HENT:      Head: Normocephalic and atraumatic.   Eyes:      Conjunctiva/sclera: Conjunctivae normal.   Cardiovascular:      Rate and Rhythm: Normal rate and regular rhythm.      Heart sounds: Normal heart sounds. No murmur heard.  Pulmonary:      Effort: Pulmonary effort is normal. No respiratory distress.      Breath sounds: Normal breath sounds. No wheezing, rhonchi or rales.   Abdominal:      General: There is no distension.      Palpations: Abdomen is soft. There is no mass.      Tenderness: There is no  abdominal tenderness. There is no guarding or rebound.   Musculoskeletal:      Cervical back: Normal range of motion and neck supple. No rigidity.      Right lower leg: No edema.      Left lower leg: No edema.   Lymphadenopathy:      Cervical: No cervical adenopathy.   Neurological:      Mental Status: She is alert and oriented to person, place, and time.   Psychiatric:         Mood and Affect: Mood normal.         Behavior: Behavior normal.         Thought Content: Thought content normal.         Judgment: Judgment normal.

## 2024-10-23 ENCOUNTER — APPOINTMENT (OUTPATIENT)
Age: 44
End: 2024-10-23
Payer: COMMERCIAL

## 2024-10-23 DIAGNOSIS — L40.9 PSORIASIS: ICD-10-CM

## 2024-10-23 DIAGNOSIS — M13.0 POLYARTHROPATHY: ICD-10-CM

## 2024-10-23 DIAGNOSIS — R79.82 ELEVATED C-REACTIVE PROTEIN (CRP): ICD-10-CM

## 2024-10-23 DIAGNOSIS — K50.819 CROHN'S DISEASE OF SMALL AND LARGE INTESTINES WITH COMPLICATION (HCC): ICD-10-CM

## 2024-10-23 LAB — URATE SERPL-MCNC: 3.9 MG/DL (ref 2–7.5)

## 2024-10-23 PROCEDURE — 84550 ASSAY OF BLOOD/URIC ACID: CPT

## 2024-10-23 PROCEDURE — 36415 COLL VENOUS BLD VENIPUNCTURE: CPT

## 2024-12-02 DIAGNOSIS — I10 PRIMARY HYPERTENSION: ICD-10-CM

## 2024-12-03 RX ORDER — LISINOPRIL 40 MG/1
40 TABLET ORAL DAILY
Qty: 30 TABLET | Refills: 5 | Status: SHIPPED | OUTPATIENT
Start: 2024-12-03 | End: 2025-03-03

## 2025-01-03 DIAGNOSIS — I10 PRIMARY HYPERTENSION: ICD-10-CM

## 2025-01-03 RX ORDER — LISINOPRIL 40 MG/1
40 TABLET ORAL DAILY
Qty: 30 TABLET | Refills: 5 | Status: SHIPPED | OUTPATIENT
Start: 2025-01-03 | End: 2025-07-02

## 2025-01-31 ENCOUNTER — OFFICE VISIT (OUTPATIENT)
Dept: URGENT CARE | Facility: CLINIC | Age: 45
End: 2025-01-31
Payer: COMMERCIAL

## 2025-01-31 VITALS
DIASTOLIC BLOOD PRESSURE: 81 MMHG | OXYGEN SATURATION: 98 % | HEART RATE: 120 BPM | SYSTOLIC BLOOD PRESSURE: 137 MMHG | RESPIRATION RATE: 20 BRPM | TEMPERATURE: 101.9 F

## 2025-01-31 DIAGNOSIS — R50.9 FEVER, UNSPECIFIED FEVER CAUSE: ICD-10-CM

## 2025-01-31 DIAGNOSIS — R68.89 FLU-LIKE SYMPTOMS: ICD-10-CM

## 2025-01-31 DIAGNOSIS — B34.9 VIRAL ILLNESS: Primary | ICD-10-CM

## 2025-01-31 DIAGNOSIS — J02.8 ACUTE PHARYNGITIS DUE TO OTHER SPECIFIED ORGANISMS: ICD-10-CM

## 2025-01-31 LAB — S PYO AG THROAT QL: NEGATIVE

## 2025-01-31 PROCEDURE — S9088 SERVICES PROVIDED IN URGENT: HCPCS | Performed by: NURSE PRACTITIONER

## 2025-01-31 PROCEDURE — 87880 STREP A ASSAY W/OPTIC: CPT | Performed by: NURSE PRACTITIONER

## 2025-01-31 PROCEDURE — 99214 OFFICE O/P EST MOD 30 MIN: CPT | Performed by: NURSE PRACTITIONER

## 2025-01-31 PROCEDURE — 87636 SARSCOV2 & INF A&B AMP PRB: CPT | Performed by: NURSE PRACTITIONER

## 2025-01-31 RX ORDER — ONDANSETRON 4 MG/1
4 TABLET, FILM COATED ORAL EVERY 8 HOURS PRN
Qty: 20 TABLET | Refills: 0 | Status: SHIPPED | OUTPATIENT
Start: 2025-01-31

## 2025-01-31 RX ORDER — OSELTAMIVIR PHOSPHATE 75 MG/1
75 CAPSULE ORAL EVERY 12 HOURS SCHEDULED
Qty: 10 CAPSULE | Refills: 0 | Status: SHIPPED | OUTPATIENT
Start: 2025-01-31 | End: 2025-02-05

## 2025-01-31 NOTE — PROGRESS NOTES
[facility]        NAME: Marilin Morgan is a 44 y.o. female  : 1980    MRN: [id number]  DATE: 2025  TIME: 3:02 PM    Assessment and Plan   Viral illness [B34.9]  1. Viral illness  oseltamivir (TAMIFLU) 75 mg capsule      2. Acute pharyngitis due to other specified organisms  POCT rapid ANTIGEN strepA    oseltamivir (TAMIFLU) 75 mg capsule      3. Fever, unspecified fever cause  Covid/Flu- Office Collect Normal    Covid/Flu- Office Collect Normal    oseltamivir (TAMIFLU) 75 mg capsule      4. Flu-like symptoms  oseltamivir (TAMIFLU) 75 mg capsule    ondansetron (ZOFRAN) 4 mg tablet            Patient Instructions       Follow up with PCP in 3-5 days.  Proceed to  ER if symptoms worsen.    If tests have been performed at [facility], our office will contact you with results if changes need to be made to the care plan discussed with you at the visit.  You can review your full results on [facility] MyChart.      You have flu like symptoms.  You are to rest. Drink gatorade or pedialyte for rehydration.    You are to eat a BRAT diet - bananas, rice, applesauce and toast.  You may try imodium for diarrhea.  Take tylenol or motrin for fever or pain.   You are to download [facility] mychart for the results in 24-48 hours.  You will be notified if the results are positive.  You are to mask as long as you are coughing, feverish or exhibiting symptoms.    Follow up with your PCP in 2-3 days  Go to the ED if symptoms worsen.      You have a covid and flu test pending.   Take the zofran you have at home.   You will need to contact your GI if flu or covid is +.  Push fluids, take tylenol or motrin when you get home.     If tests have been performed at [facility], our office will contact you with results if changes need to be made to the care plan discussed with you at the visit.  You can review your full results on [facility] MyChart.    Follow up with your PCP in 3-5 days  Go to the ED if symptoms worsen          Chief Complaint     Chief Complaint   Patient presents with    Sore Throat    Vomiting    Generalized Body Aches    Fever         History of Present Illness       This is a 44 year old female who comes to [facility] with c/o sorethroat, vomiting, body aches, fever, chills x 1 day. She has not had her flu vaccine. She states that she gets injections for her crohns and was told to go to [facility] and get RSV, norovirus, flu and covid testing (pt informed that norovirus and RSV testing not done at ). She states she has taken tylenol at 10 am today.  She has had no diarrhea and denies pregnancy.  PMH Is listed and reviewed.         Review of Systems   Review of Systems   Constitutional:  Positive for chills, fatigue and fever.   HENT:  Positive for congestion and sore throat.    Eyes: Negative.    Respiratory: Negative.     Cardiovascular: Negative.    Gastrointestinal:  Positive for nausea and vomiting. Negative for diarrhea.   Endocrine: Negative.    Musculoskeletal: Negative.    Skin: Negative.    Neurological:  Positive for headaches.   Hematological: Negative.    Psychiatric/Behavioral: Negative.           Current Medications       Current Outpatient Medications:     Cholecalciferol (VITAMIN D3 PO), Take 4,000 Units by mouth, Disp: , Rfl:     DULoxetine (CYMBALTA) 30 mg delayed release capsule, Take 30 mg by mouth daily, Disp: , Rfl:     ELDERBERRY PO, Take by mouth, Disp: , Rfl:     lisinopril (ZESTRIL) 40 mg tablet, Take 1 tablet (40 mg total) by mouth daily, Disp: 30 tablet, Rfl: 5    Mibelas 24 Fe 1-20 MG-MCG(24) CHEW, , Disp: , Rfl:     Multiple Vitamin (multivitamin) tablet, Take 1 tablet by mouth daily, Disp: , Rfl:     omeprazole (PriLOSEC) 20 mg delayed release capsule, daily, Disp: , Rfl:     ondansetron (ZOFRAN) 4 mg tablet, Take 1 tablet (4 mg total) by mouth every 8 (eight) hours as needed for nausea or vomiting, Disp: 20 tablet, Rfl: 0    oseltamivir (TAMIFLU) 75 mg capsule, Take 1 capsule (75  mg total) by mouth every 12 (twelve) hours for 5 days, Disp: 10 capsule, Rfl: 0    Risankizumab-rzaa (SKYRIZI IV), Inject as directed, Disp: , Rfl:     allopurinol (ZYLOPRIM) 100 mg tablet, Take 1 tablet (100 mg total) by mouth daily (Patient not taking: Reported on 1/31/2025), Disp: 30 tablet, Rfl: 6    clobetasol (TEMOVATE) 0.05 % external solution, Apply topically in the morning (Patient not taking: Reported on 3/8/2024), Disp: 50 mL, Rfl: 0    fluticasone (FLONASE) 50 mcg/act nasal spray, 2 sprays into each nostril daily (Patient not taking: Reported on 1/31/2025), Disp: 16 g, Rfl: 3    mesalamine (APRISO) 0.375 g 24 hr capsule, , Disp: , Rfl:     metoprolol succinate (TOPROL-XL) 25 mg 24 hr tablet, Take 1 tablet (25 mg total) by mouth daily Please take half a tablet per day. (Patient taking differently: Take 12.5 mg by mouth daily Please take half a tablet per day.), Disp: 30 tablet, Rfl: 5    nystatin (MYCOSTATIN) cream, Apply topically 2 (two) times a day for 15 days (Patient not taking: Reported on 10/18/2024), Disp: 30 g, Rfl: 0    predniSONE 20 mg tablet, Take 2 tablets daily for 3 days then 1 tablet daily for 3 days, Disp: 9 tablet, Rfl: 0    sucralfate (CARAFATE) 1 g tablet, 2 (two) times a day (Patient not taking: Reported on 3/5/2024), Disp: , Rfl:     Current Allergies     Allergies as of 01/31/2025    (No Known Allergies)            The following portions of the patient's history were reviewed and updated as appropriate: allergies, current medications, past family history, past medical history, past social history, past surgical history and problem list.     Past Medical History:   Diagnosis Date    COVID-19 vaccine administered     J & J in March 2021    Crohn disease (HCC)     Hypertension     IBS (irritable bowel syndrome)     Inflammatory bowel disease 2003    Crohns dx 11/2020    Kidney stone 06/2021    Lithotripsy done 7/2021    Obstruction of right ureteropelvic junction (UPJ) due to stone  6/18/2021    Obstruction of right ureteropelvic junction due to stone     PONV (postoperative nausea and vomiting)     Pt requests to be pre-medicated for N/V prior to sx    Psoriatic arthritis (HCC)     Wears glasses        Past Surgical History:   Procedure Laterality Date    CHOLECYSTECTOMY      RI CYSTOURETHROSCOPY W/URETERAL CATHETERIZATION Right 6/18/2021    Procedure: CYSTOSCOPY RETROGRADE PYELOGRAM WITH INSERTION STENT URETERAL;  Surgeon: Oren Jay MD;  Location:  MAIN OR;  Service: Urology    RI LITHOTRIPSY XTRCORP SHOCK WAVE Right 7/16/2021    Procedure: LITHOTRIPSY EXTRACORPORAL SHOCKWAVE (ESWL);  Surgeon: Oren Jay MD;  Location:  MAIN OR;  Service: Urology    TONSILLECTOMY         Family History   Problem Relation Age of Onset    Diabetes Mother     Hypertension Father     Heart attack Father     No Known Problems Maternal Grandmother     No Known Problems Maternal Grandfather     No Known Problems Paternal Grandmother     No Known Problems Paternal Grandfather     No Known Problems Maternal Aunt     No Known Problems Maternal Aunt     Breast cancer Paternal Aunt 41         Medications have been verified.        Objective   /81   Pulse (!) 120   Temp (!) 101.9 °F (38.8 °C)   Resp 20   SpO2 98%   No LMP recorded. (Menstrual status: Birth Control).       Physical Exam     Physical Exam  Vitals and nursing note reviewed.   Constitutional:       General: She is not in acute distress.     Appearance: She is well-developed and normal weight. She is not ill-appearing, toxic-appearing or diaphoretic.   HENT:      Head: Normocephalic and atraumatic.      Right Ear: Tympanic membrane and ear canal normal.      Left Ear: Tympanic membrane and ear canal normal.      Nose: Congestion and rhinorrhea present.      Mouth/Throat:      Mouth: Mucous membranes are moist. No oral lesions.      Pharynx: No pharyngeal swelling, oropharyngeal exudate, posterior oropharyngeal erythema or uvula swelling.       Tonsils: No tonsillar exudate or tonsillar abscesses.   Eyes:      Extraocular Movements:      Right eye: Normal extraocular motion.      Left eye: Normal extraocular motion.   Cardiovascular:      Rate and Rhythm: Regular rhythm. Tachycardia present.      Heart sounds: Normal heart sounds. No murmur heard.  Pulmonary:      Effort: Pulmonary effort is normal. No respiratory distress.      Breath sounds: No stridor. Examination of the left-lower field reveals rhonchi. Rhonchi present. No wheezing or rales.   Chest:      Chest wall: No tenderness.   Musculoskeletal:      Cervical back: Normal range of motion and neck supple.   Lymphadenopathy:      Cervical: No cervical adenopathy.   Skin:     General: Skin is warm and dry.      Capillary Refill: Capillary refill takes less than 2 seconds.   Neurological:      General: No focal deficit present.      Mental Status: She is alert and oriented to person, place, and time.   Psychiatric:         Mood and Affect: Mood normal.         Behavior: Behavior normal.

## 2025-01-31 NOTE — PATIENT INSTRUCTIONS
You have flu like symptoms.  You are to rest. Drink gatorade or pedialyte for rehydration.    You are to eat a BRAT diet - bananas, rice, applesauce and toast.  You may try imodium for diarrhea.  Take tylenol or motrin for fever or pain.   You are to download [facility] mychart for the results in 24-48 hours.  You will be notified if the results are positive.  You are to mask as long as you are coughing, feverish or exhibiting symptoms.    Follow up with your PCP in 2-3 days  Go to the ED if symptoms worsen.      You have a covid and flu test pending.   Take the zofran you have at home.   You will need to contact your GI if flu or covid is +.  Push fluids, take tylenol or motrin when you get home.     If tests have been performed at [facility], our office will contact you with results if changes need to be made to the care plan discussed with you at the visit.  You can review your full results on [facility] MyChart.    Follow up with your PCP in 3-5 days  Go to the ED if symptoms worsen

## 2025-01-31 NOTE — LETTER
January 31, 2025     Patient: Marilin Morgan   YOB: 1980   Date of Visit: 1/31/2025       To Whom It May Concern:    It is my medical opinion that Marilin Morgan may return to work on 2/5/2025 .    If you have any questions or concerns, please don't hesitate to call.         Sincerely,        DIANE Liriano    CC: No Recipients

## 2025-02-01 LAB
FLUAV RNA RESP QL NAA+PROBE: NEGATIVE
FLUBV RNA RESP QL NAA+PROBE: NEGATIVE
SARS-COV-2 RNA RESP QL NAA+PROBE: NEGATIVE

## 2025-02-03 ENCOUNTER — OFFICE VISIT (OUTPATIENT)
Dept: FAMILY MEDICINE CLINIC | Facility: CLINIC | Age: 45
End: 2025-02-03
Payer: COMMERCIAL

## 2025-02-03 ENCOUNTER — NURSE TRIAGE (OUTPATIENT)
Age: 45
End: 2025-02-03

## 2025-02-03 VITALS
TEMPERATURE: 98.4 F | BODY MASS INDEX: 38.48 KG/M2 | HEART RATE: 102 BPM | SYSTOLIC BLOOD PRESSURE: 124 MMHG | OXYGEN SATURATION: 97 % | HEIGHT: 60 IN | DIASTOLIC BLOOD PRESSURE: 82 MMHG | WEIGHT: 196 LBS

## 2025-02-03 DIAGNOSIS — J06.9 VIRAL URI: Primary | ICD-10-CM

## 2025-02-03 DIAGNOSIS — Z23 ENCOUNTER FOR IMMUNIZATION: ICD-10-CM

## 2025-02-03 PROCEDURE — 99213 OFFICE O/P EST LOW 20 MIN: CPT

## 2025-02-03 NOTE — TELEPHONE ENCOUNTER
"Pt warm transferred over by PEP.  Pt was recently seen at  on 1/31/25 and was flu and strep negative.  However pt does not feel improved.  Pt states she is feeling SOB with activity and some chest tightness.  Pt c/o cough productive with dark sputum with what she thinks look like blood clots.  Pt states her nasal mucus is clear.  Pt states her chest and breathing feels like when she had Pneumonia last time.  Pt also c/o neck discomfort.  States it feels like three are lymph nodes that are sore and sensitive.  Pt states she is taking Tylenol every 4 hours without relief.  Temp this morning 98.6.  Pt does not have a pulse ox available at home to check O2 level.    Advised pt to schedule appt to be further evaluated.  Pt scheduled to see DIANE Ledbetter today 2/3/25 at 1100.  PT is agreeable with plan.    Reason for Disposition   MILD difficulty breathing (e.g., minimal/no SOB at rest, SOB with walking, pulse < 100) of new-onset or worse than normal    Answer Assessment - Initial Assessment Questions  1. RESPIRATORY STATUS: \"Describe your breathing?\" (e.g., wheezing, shortness of breath, unable to speak, severe coughing)       SOB when she's walking around. Chest feels tight.    2. ONSET: \"When did this breathing problem begin?\"       Started Saturday    3. PATTERN \"Does the difficult breathing come and go, or has it been constant since it started?\"       Intermittent.    4. SEVERITY: \"How bad is your breathing?\" (e.g., mild, moderate, severe)       Moderate    5. RECURRENT SYMPTOM: \"Have you had difficulty breathing before?\" If Yes, ask: \"When was the last time?\" and \"What happened that time?\"       States she had Pneumonia before in the past and it feels similar.    6. CARDIAC HISTORY: \"Do you have any history of heart disease?\" (e.g., heart attack, angina, bypass surgery, angioplasty)       Denies.    7. LUNG HISTORY: \"Do you have any history of lung disease?\"  (e.g., pulmonary embolus, asthma, emphysema)      " "Denies    8. CAUSE: \"What do you think is causing the breathing problem?\"       Unsure?    9. OTHER SYMPTOMS: \"Do you have any other symptoms?\" (e.g., chest pain, cough, dizziness, fever, runny nose)      Chest tightness, cough with phlegm that is dark blood like a clot,  statesnasal mucus is clear.    10. O2 SATURATION MONITOR:  \"Do you use an oxygen saturation monitor (pulse oximeter) at home?\" If Yes, ask: \"What is your reading (oxygen level) today?\" \"What is your usual oxygen saturation reading?\" (e.g., 95%)        Does not have one available.    11. PREGNANCY: \"Is there any chance you are pregnant?\" \"When was your last menstrual period?\"        Denies    12. TRAVEL: \"Have you traveled out of the country in the last month?\" (e.g., travel history, exposures)        Denies    Protocols used: Breathing Difficulty-Adult-[place]    "

## 2025-02-03 NOTE — PATIENT INSTRUCTIONS
No signs of bacterial infection on exam today.    Signs & symptoms consistent with viral illness.    OTC medications & supportive care as directed.    Most viral illnesses last about 7-10 days with days 3-5 being the worst in severity of symptoms.    If symptoms persist past 10 days, follow up with PCP.    If symptoms worsen, proceed to the ER.

## 2025-02-03 NOTE — PROGRESS NOTES
Name: Marilin Morgan      : 1980      MRN: [id number]  Encounter Provider: DIANE Dubois  Encounter Date: 2/3/2025   Encounter department: [facility]  :  Assessment & Plan  Viral URI    Day 5 of symptoms  Seen at  25 - started on Tamiflu & Zofran  Rapid strep negative; COVID/Flu negative    Patient reports fevers, body aches, sore throat & SOB  Exam revealed clear lungs at today's visit  No signs of bacterial infection    Patient declined CXR  Patient declined Albuterol inhaler    Counseled on continued supportive care & OTC meds  ER precautions provided         Encounter for immunization    Counseled.    Orders:    influenza vaccine preservative-free 0.5 mL IM (Fluzone, Afluria, Fluarix, Flulaval)    Pneumococcal Conjugate Vaccine 20-valent (Pcv20)           History of Present Illness     Review of Systems   Constitutional:  Positive for fever. Negative for chills and fatigue.   HENT:  Positive for sore throat. Negative for congestion, ear pain, facial swelling, hearing loss, rhinorrhea, sinus pressure, sneezing and trouble swallowing.    Eyes:  Negative for pain, redness and visual disturbance.   Respiratory:  Positive for shortness of breath. Negative for cough, chest tightness and wheezing.    Cardiovascular:  Negative for chest pain and palpitations.   Gastrointestinal:  Negative for abdominal pain, diarrhea, nausea and vomiting.   Genitourinary:  Negative for dysuria, flank pain, hematuria and pelvic pain.   Musculoskeletal:  Positive for back pain. Negative for arthralgias and myalgias.   Skin:  Negative for color change and rash.   Neurological:  Negative for dizziness, seizures, syncope, weakness, light-headedness and headaches.   Psychiatric/Behavioral:  Negative for confusion, hallucinations and sleep disturbance. The patient is not nervous/anxious.    All other systems reviewed and are negative.      Objective   /82   Pulse 102   Temp 98.4 °F  (36.9 °C) (Tympanic)   Ht 5' (1.524 m)   Wt 88.9 kg (196 lb)   SpO2 97%   BMI 38.28 kg/m²      Physical Exam  Vitals and nursing note reviewed.   Constitutional:       General: She is not in acute distress.     Appearance: She is well-developed.   HENT:      Head: Normocephalic and atraumatic.      Right Ear: Hearing and tympanic membrane normal.      Left Ear: Hearing and tympanic membrane normal.      Nose: Nose normal.      Mouth/Throat:      Mouth: Mucous membranes are moist.      Dentition: Normal dentition.      Tongue: No lesions.      Pharynx: Oropharynx is clear. Uvula midline. No oropharyngeal exudate.      Tonsils: No tonsillar exudate.   Eyes:      Extraocular Movements: Extraocular movements intact.      Conjunctiva/sclera: Conjunctivae normal.   Neck:      Vascular: No carotid bruit or JVD.   Cardiovascular:      Rate and Rhythm: Normal rate and regular rhythm.      Heart sounds: S1 normal and S2 normal. No murmur heard.  Pulmonary:      Effort: Pulmonary effort is normal. No tachypnea or respiratory distress.      Breath sounds: Normal breath sounds and air entry. No decreased breath sounds.      Comments: CTAB  Chest:      Chest wall: No deformity or tenderness.   Abdominal:      General: Abdomen is flat. Bowel sounds are normal. There is no distension.      Palpations: Abdomen is soft.      Tenderness: There is no abdominal tenderness. There is no right CVA tenderness, left CVA tenderness or guarding.   Musculoskeletal:         General: No swelling.      Cervical back: Full passive range of motion without pain and neck supple.      Right lower leg: No edema.      Left lower leg: No edema.   Lymphadenopathy:      Cervical: Cervical adenopathy (R>L) present.   Skin:     General: Skin is warm and dry.      Capillary Refill: Capillary refill takes less than 2 seconds.      Findings: No rash.             Comments: Few scattered pimples  No erythema, warmth or drainage  Bilateral on body; not  vesicular   Neurological:      Mental Status: She is alert and oriented to person, place, and time.      Cranial Nerves: Cranial nerves 2-12 are intact.      Sensory: Sensation is intact.      Motor: Motor function is intact.      Coordination: Coordination is intact.      Gait: Gait is intact.   Psychiatric:         Mood and Affect: Mood normal.         Behavior: Behavior is cooperative.

## 2025-03-26 RX ORDER — UBIDECARENONE 75 MG
100 CAPSULE ORAL DAILY
COMMUNITY

## 2025-03-31 ENCOUNTER — OFFICE VISIT (OUTPATIENT)
Dept: FAMILY MEDICINE CLINIC | Facility: CLINIC | Age: 45
End: 2025-03-31
Payer: COMMERCIAL

## 2025-03-31 VITALS
TEMPERATURE: 98 F | OXYGEN SATURATION: 98 % | HEART RATE: 120 BPM | BODY MASS INDEX: 38.83 KG/M2 | WEIGHT: 197.8 LBS | HEIGHT: 60 IN | SYSTOLIC BLOOD PRESSURE: 128 MMHG | DIASTOLIC BLOOD PRESSURE: 74 MMHG

## 2025-03-31 DIAGNOSIS — I10 PRIMARY HYPERTENSION: ICD-10-CM

## 2025-03-31 DIAGNOSIS — Z00.00 ANNUAL PHYSICAL EXAM: Primary | ICD-10-CM

## 2025-03-31 DIAGNOSIS — R00.0 TACHYCARDIA: ICD-10-CM

## 2025-03-31 DIAGNOSIS — E78.00 HYPERCHOLESTEROLEMIA: ICD-10-CM

## 2025-03-31 DIAGNOSIS — L40.50 PSORIATIC ARTHRITIS (HCC): ICD-10-CM

## 2025-03-31 DIAGNOSIS — E66.9 OBESITY (BMI 30-39.9): ICD-10-CM

## 2025-03-31 DIAGNOSIS — K50.90 CROHN'S DISEASE WITHOUT COMPLICATION, UNSPECIFIED GASTROINTESTINAL TRACT LOCATION (HCC): ICD-10-CM

## 2025-03-31 DIAGNOSIS — Z23 ENCOUNTER FOR IMMUNIZATION: ICD-10-CM

## 2025-03-31 PROCEDURE — 99213 OFFICE O/P EST LOW 20 MIN: CPT | Performed by: FAMILY MEDICINE

## 2025-03-31 PROCEDURE — 99396 PREV VISIT EST AGE 40-64: CPT | Performed by: FAMILY MEDICINE

## 2025-03-31 NOTE — PROGRESS NOTES
Assessment/Plan:    No problem-specific Assessment & Plan notes found for this encounter.       Diagnoses and all orders for this visit:    Annual physical exam    Primary hypertension  -     CBC and differential; Future    Encounter for immunization    Crohn's disease without complication, unspecified gastrointestinal tract location (HCC)  Comments:  per GI    Psoriatic arthritis (HCC)    Tachycardia  -     TSH, 3rd generation with Free T4 reflex; Future  -     Ambulatory Referral to Cardiology; Future    Obesity (BMI 30-39.9)  Comments:  pt counseled on diet and exercise    Hypercholesterolemia  -     Comprehensive metabolic panel; Future  -     Lipid panel; Future    Other orders  -     cyanocobalamin (VITAMIN B-12) 100 mcg tablet; Take 100 mcg by mouth daily          PHQ-2/9 Depression Screening    Little interest or pleasure in doing things: 0 - not at all  Feeling down, depressed, or hopeless: 0 - not at all  PHQ-2 Score: 0  PHQ-2 Interpretation: Negative depression screen            Subjective:      Patient ID: Marilin Morgan is a 44 y.o. female.    Pt here for annual physical        The following portions of the patient's history were reviewed and updated as appropriate: allergies, current medications, past family history, past medical history, past social history, past surgical history, and problem list.    Review of Systems   Constitutional:  Negative for chills, fatigue and fever.   HENT: Negative.  Negative for hearing loss.    Eyes: Negative.  Negative for visual disturbance.   Respiratory:  Negative for shortness of breath and wheezing.    Cardiovascular:  Negative for chest pain and palpitations.   Gastrointestinal:  Negative for abdominal pain, blood in stool, constipation, diarrhea, nausea and vomiting.   Endocrine: Negative.    Genitourinary:  Negative for difficulty urinating and dysuria.   Musculoskeletal:  Positive for arthralgias. Negative for myalgias.   Skin: Negative.    Allergic/Immunologic:  Negative.    Neurological:  Negative for seizures and syncope.   Hematological:  Negative for adenopathy.   Psychiatric/Behavioral: Negative.           Objective:    /74 (Patient Position: Sitting)   Pulse (!) 120   Temp 98 °F (36.7 °C) (Tympanic)   Ht 5' (1.524 m)   Wt 89.7 kg (197 lb 12.8 oz)   SpO2 98%   BMI 38.63 kg/m²      Physical Exam  Vitals and nursing note reviewed.   Constitutional:       General: She is not in acute distress.     Appearance: Normal appearance. She is well-developed. She is not ill-appearing, toxic-appearing or diaphoretic.   HENT:      Head: Normocephalic and atraumatic.      Right Ear: Tympanic membrane, ear canal and external ear normal. There is no impacted cerumen.      Left Ear: Tympanic membrane, ear canal and external ear normal. There is no impacted cerumen.      Nose: Nose normal. No congestion or rhinorrhea.      Mouth/Throat:      Mouth: Mucous membranes are moist.      Pharynx: Oropharynx is clear. No oropharyngeal exudate or posterior oropharyngeal erythema.   Eyes:      General: No scleral icterus.        Right eye: No discharge.         Left eye: No discharge.      Conjunctiva/sclera: Conjunctivae normal.      Pupils: Pupils are equal, round, and reactive to light.   Cardiovascular:      Rate and Rhythm: Normal rate and regular rhythm.      Pulses: Normal pulses.      Heart sounds: Normal heart sounds. No murmur heard.  Pulmonary:      Effort: Pulmonary effort is normal. No respiratory distress.      Breath sounds: Normal breath sounds. No wheezing, rhonchi or rales.   Abdominal:      General: Bowel sounds are normal. There is no distension.      Palpations: Abdomen is soft. There is no mass.      Tenderness: There is no abdominal tenderness. There is no guarding or rebound.   Musculoskeletal:         General: Normal range of motion.      Cervical back: Normal range of motion and neck supple. No rigidity.      Right lower leg: No edema.      Left lower leg: No  edema.   Lymphadenopathy:      Cervical: No cervical adenopathy.   Skin:     General: Skin is warm and dry.      Findings: No rash.   Neurological:      General: No focal deficit present.      Mental Status: She is alert and oriented to person, place, and time.      Sensory: No sensory deficit.      Motor: No weakness or abnormal muscle tone.      Coordination: Coordination normal.      Gait: Gait normal.      Deep Tendon Reflexes: Reflexes are normal and symmetric.   Psychiatric:         Mood and Affect: Mood normal.         Behavior: Behavior normal.         Thought Content: Thought content normal.         Judgment: Judgment normal.

## 2025-04-17 ENCOUNTER — DOCUMENTATION (OUTPATIENT)
Dept: ADMINISTRATIVE | Facility: OTHER | Age: 45
End: 2025-04-17

## 2025-04-17 ENCOUNTER — OFFICE VISIT (OUTPATIENT)
Dept: URGENT CARE | Facility: CLINIC | Age: 45
End: 2025-04-17
Payer: COMMERCIAL

## 2025-04-17 VITALS
SYSTOLIC BLOOD PRESSURE: 150 MMHG | DIASTOLIC BLOOD PRESSURE: 100 MMHG | TEMPERATURE: 97.6 F | HEART RATE: 108 BPM | RESPIRATION RATE: 20 BRPM | OXYGEN SATURATION: 97 %

## 2025-04-17 DIAGNOSIS — J20.9 ACUTE BRONCHITIS, UNSPECIFIED ORGANISM: Primary | ICD-10-CM

## 2025-04-17 PROCEDURE — 99213 OFFICE O/P EST LOW 20 MIN: CPT | Performed by: STUDENT IN AN ORGANIZED HEALTH CARE EDUCATION/TRAINING PROGRAM

## 2025-04-17 PROCEDURE — S9088 SERVICES PROVIDED IN URGENT: HCPCS | Performed by: STUDENT IN AN ORGANIZED HEALTH CARE EDUCATION/TRAINING PROGRAM

## 2025-04-17 RX ORDER — RISANKIZUMAB-RZAA 360 MG/2.4
WEARABLE INJECTOR SUBCUTANEOUS
COMMUNITY
Start: 2025-03-12

## 2025-04-17 RX ORDER — BENZONATATE 100 MG/1
100 CAPSULE ORAL 3 TIMES DAILY PRN
Qty: 20 CAPSULE | Refills: 0 | Status: SHIPPED | OUTPATIENT
Start: 2025-04-17

## 2025-04-17 RX ORDER — AZITHROMYCIN 250 MG/1
TABLET, FILM COATED ORAL
Qty: 6 TABLET | Refills: 0 | Status: SHIPPED | OUTPATIENT
Start: 2025-04-17 | End: 2025-04-21

## 2025-04-17 NOTE — PROGRESS NOTES
[facility]        NAME: Marilin Morgan is a 44 y.o. female  : 1980    MRN: [id number]  DATE: 2025  TIME: 9:06 AM    Assessment and Plan   Acute bronchitis, unspecified organism [J20.9]  1. Acute bronchitis, unspecified organism  azithromycin (ZITHROMAX) 250 mg tablet    benzonatate (TESSALON PERLES) 100 mg capsule        Take azithromycin as prescribed, recommend a probiotic whil tony the antibiotic to protect the gut micobiome  Take tessalon perles as needed for cough, if you do not find them helpful you do not need to take them  Tylenol and heating pad for costochondritis  Fu with PCP in 7-10 days if not improving  Go to ED if symptoms worsen    Patient Instructions       Follow up with PCP in 3-5 days.  Proceed to  ER if symptoms worsen.    If tests have been performed at [facility], our office will contact you with results if changes need to be made to the care plan discussed with you at the visit.  You can review your full results on [facility] MyChart.    Chief Complaint     Chief Complaint   Patient presents with    Cold Like Symptoms    Cough     3 days         History of Present Illness       Cough  This is a new problem. The current episode started in the past 7 days. The problem has been gradually worsening. The cough is Productive of sputum. Associated symptoms include chest pain (pleuritic, reproducible with palpation), ear congestion and headaches. Pertinent negatives include no fever, nasal congestion, rhinorrhea, sore throat, shortness of breath or wheezing. Nothing aggravates the symptoms. Treatments tried: corcidin. The treatment provided mild relief. There is no history of asthma or bronchitis.       Review of Systems   Review of Systems   Constitutional:  Negative for fever.   HENT:  Negative for rhinorrhea and sore throat.    Respiratory:  Positive for cough. Negative for shortness of breath and wheezing.    Cardiovascular:  Positive for chest pain (pleuritic,  reproducible with palpation).   Neurological:  Positive for headaches.       As stated in HPI      Current Medications       Current Outpatient Medications:     azithromycin (ZITHROMAX) 250 mg tablet, Take 2 tablets today then 1 tablet daily x 4 days, Disp: 6 tablet, Rfl: 0    benzonatate (TESSALON PERLES) 100 mg capsule, Take 1 capsule (100 mg total) by mouth 3 (three) times a day as needed for cough, Disp: 20 capsule, Rfl: 0    Cholecalciferol (VITAMIN D3 PO), Take 4,000 Units by mouth, Disp: , Rfl:     cyanocobalamin (VITAMIN B-12) 100 mcg tablet, Take 100 mcg by mouth daily, Disp: , Rfl:     DULoxetine (CYMBALTA) 30 mg delayed release capsule, Take 30 mg by mouth daily, Disp: , Rfl:     ELDERBERRY PO, Take by mouth, Disp: , Rfl:     fluticasone (FLONASE) 50 mcg/act nasal spray, 2 sprays into each nostril daily, Disp: 16 g, Rfl: 3    lisinopril (ZESTRIL) 40 mg tablet, Take 1 tablet (40 mg total) by mouth daily, Disp: 30 tablet, Rfl: 5    Mibelas 24 Fe 1-20 MG-MCG(24) CHEW, , Disp: , Rfl:     Multiple Vitamin (multivitamin) tablet, Take 1 tablet by mouth daily, Disp: , Rfl:     omeprazole (PriLOSEC) 20 mg delayed release capsule, daily, Disp: , Rfl:     ondansetron (ZOFRAN) 4 mg tablet, Take 1 tablet (4 mg total) by mouth every 8 (eight) hours as needed for nausea or vomiting, Disp: 20 tablet, Rfl: 0    Risankizumab-rzaa (SKYRIZI IV), Inject as directed, Disp: , Rfl:     Skyrizi 360 MG/2.4ML SOCT, , Disp: , Rfl:     sucralfate (CARAFATE) 1 g tablet, 2 (two) times a day, Disp: , Rfl:     metoprolol succinate (TOPROL-XL) 25 mg 24 hr tablet, Take 1 tablet (25 mg total) by mouth daily Please take half a tablet per day. (Patient taking differently: Take 12.5 mg by mouth daily Please take half a tablet per day.), Disp: 30 tablet, Rfl: 5    Current Allergies     Allergies as of 04/17/2025    (No Known Allergies)            The following portions of the patient's history were reviewed and updated as appropriate:  allergies, current medications, past family history, past medical history, past social history, past surgical history and problem list.     Past Medical History:   Diagnosis Date    COVID-19 vaccine administered     J [employer] J in March 2021    Crohn disease (HCC)     Hypertension     IBS (irritable bowel syndrome)     Inflammatory bowel disease 2003    Crohns dx 11/2020    Kidney stone 06/2021    Lithotripsy done 7/2021    Obstruction of right ureteropelvic junction (UPJ) due to stone 6/18/2021    Obstruction of right ureteropelvic junction due to stone     PONV (postoperative nausea and vomiting)     Pt requests to be pre-medicated for N/V prior to sx    Psoriatic arthritis (HCC)     Wears glasses        Past Surgical History:   Procedure Laterality Date    CHOLECYSTECTOMY      OR CYSTOURETHROSCOPY W/URETERAL CATHETERIZATION Right 6/18/2021    Procedure: CYSTOSCOPY RETROGRADE PYELOGRAM WITH INSERTION STENT URETERAL;  Surgeon: Oren Jay MD;  Location:  MAIN OR;  Service: Urology    OR LITHOTRIPSY XTRCORP SHOCK WAVE Right 7/16/2021    Procedure: LITHOTRIPSY EXTRACORPORAL SHOCKWAVE (ESWL);  Surgeon: Oren Jay MD;  Location:  MAIN OR;  Service: Urology    TONSILLECTOMY         Family History   Problem Relation Age of Onset    Diabetes Mother     Hypertension Father     Heart attack Father     No Known Problems Maternal Grandmother     No Known Problems Maternal Grandfather     No Known Problems Paternal Grandmother     No Known Problems Paternal Grandfather     No Known Problems Maternal Aunt     No Known Problems Maternal Aunt     Breast cancer Paternal Aunt 41         Medications have been verified.        Objective   /100 (BP Location: Right arm)   Pulse (!) 108   Temp 97.6 °F (36.4 °C)   Resp 20   SpO2 97%   No LMP recorded. (Menstrual status: Birth Control).       Physical Exam     Physical Exam  Constitutional:       General: She is not in acute distress.     Appearance: Normal appearance. She is  not toxic-appearing.   HENT:      Head: Normocephalic and atraumatic.      Right Ear: Tympanic membrane, ear canal and external ear normal.      Left Ear: Tympanic membrane, ear canal and external ear normal.      Nose: No congestion.      Mouth/Throat:      Pharynx: Posterior oropharyngeal erythema present. No oropharyngeal exudate.   Cardiovascular:      Rate and Rhythm: Normal rate.   Pulmonary:      Effort: Pulmonary effort is normal.      Breath sounds: Normal breath sounds. No wheezing.   Neurological:      General: No focal deficit present.      Mental Status: She is alert.   Psychiatric:         Mood and Affect: Mood normal.

## 2025-04-17 NOTE — PROGRESS NOTES
04/17/25 2:36 PM    Patient was called after the Urgent Care visit ; a message was left for the patient to return the call    Thank you.  Lee Ann Brown MA  [facility]

## 2025-04-17 NOTE — PROGRESS NOTES
Gina Hobbs, RN  P Patient Reported Team         Blood pressure elevated  Appointment department: [facility]  Appointment provider: * No providers found *  Blood pressure  04/17/25 0852 150/100  04/17/25 0849 (!) 164/102  Inactive  Date User Comment   04/17/25 0852 Gina Hobbs RN [[id number]] None

## 2025-04-17 NOTE — PATIENT INSTRUCTIONS
Take azithromycin as prescribed, recommend a probiotic whil tony the antibiotic to protect the gut micobiome  Take tessalon perles as needed for cough, if you do not find them helpful you do not need to take them  Tylenol and heating pad for costochondritis  Fu with PCP in 7-10 days if not improving  Go to ED if symptoms worsen

## 2025-04-23 ENCOUNTER — APPOINTMENT (EMERGENCY)
Dept: RADIOLOGY | Facility: HOSPITAL | Age: 45
End: 2025-04-23
Payer: COMMERCIAL

## 2025-04-23 ENCOUNTER — HOSPITAL ENCOUNTER (EMERGENCY)
Facility: HOSPITAL | Age: 45
Discharge: HOME | End: 2025-04-23
Attending: EMERGENCY MEDICINE
Payer: COMMERCIAL

## 2025-04-23 VITALS
HEART RATE: 76 BPM | SYSTOLIC BLOOD PRESSURE: 160 MMHG | TEMPERATURE: 98.5 F | DIASTOLIC BLOOD PRESSURE: 83 MMHG | RESPIRATION RATE: 20 BRPM | OXYGEN SATURATION: 98 %

## 2025-04-23 DIAGNOSIS — I10 HYPERTENSION: Primary | ICD-10-CM

## 2025-04-23 LAB
ALBUMIN SERPL BCG-MCNC: 4.2 G/DL (ref 3.5–5)
ALP SERPL-CCNC: 56 U/L (ref 34–104)
ALT SERPL W P-5'-P-CCNC: 25 U/L (ref 7–52)
ANION GAP SERPL CALCULATED.3IONS-SCNC: 7 MMOL/L (ref 4–13)
AST SERPL W P-5'-P-CCNC: 18 U/L (ref 13–39)
ATRIAL RATE: 83 BPM
BASOPHILS # BLD AUTO: 0.04 THOUSANDS/ÂΜL (ref 0–0.1)
BASOPHILS NFR BLD AUTO: 1 % (ref 0–1)
BILIRUB SERPL-MCNC: 0.48 MG/DL (ref 0.2–1)
BILIRUB UR QL STRIP: NEGATIVE
BUN SERPL-MCNC: 11 MG/DL (ref 5–25)
CALCIUM SERPL-MCNC: 8.8 MG/DL (ref 8.4–10.2)
CARDIAC TROPONIN I PNL SERPL HS: 3 NG/L (ref ?–50)
CHLORIDE SERPL-SCNC: 102 MMOL/L (ref 96–108)
CLARITY UR: CLEAR
CO2 SERPL-SCNC: 28 MMOL/L (ref 21–32)
COLOR UR: YELLOW
CREAT SERPL-MCNC: 0.74 MG/DL (ref 0.6–1.3)
EOSINOPHIL # BLD AUTO: 0.08 THOUSAND/ÂΜL (ref 0–0.61)
EOSINOPHIL NFR BLD AUTO: 1 % (ref 0–6)
ERYTHROCYTE [DISTWIDTH] IN BLOOD BY AUTOMATED COUNT: 12.8 % (ref 11.6–15.1)
EXT PREGNANCY TEST URINE: NEGATIVE
EXT. CONTROL: NORMAL
GFR SERPL CREATININE-BSD FRML MDRD: 97 ML/MIN/1.73SQ M
GLUCOSE SERPL-MCNC: 92 MG/DL (ref 65–140)
GLUCOSE UR STRIP-MCNC: NEGATIVE MG/DL
HCT VFR BLD AUTO: 43 % (ref 34.8–46.1)
HGB BLD-MCNC: 13.8 G/DL (ref 11.5–15.4)
HGB UR QL STRIP.AUTO: NEGATIVE
IMM GRANULOCYTES # BLD AUTO: 0.03 THOUSAND/UL (ref 0–0.2)
IMM GRANULOCYTES NFR BLD AUTO: 0 % (ref 0–2)
KETONES UR STRIP-MCNC: NEGATIVE MG/DL
LEUKOCYTE ESTERASE UR QL STRIP: NEGATIVE
LYMPHOCYTES # BLD AUTO: 2.35 THOUSANDS/ÂΜL (ref 0.6–4.47)
LYMPHOCYTES NFR BLD AUTO: 32 % (ref 14–44)
MCH RBC QN AUTO: 27.8 PG (ref 26.8–34.3)
MCHC RBC AUTO-ENTMCNC: 32.1 G/DL (ref 31.4–37.4)
MCV RBC AUTO: 87 FL (ref 82–98)
MONOCYTES # BLD AUTO: 0.57 THOUSAND/ÂΜL (ref 0.17–1.22)
MONOCYTES NFR BLD AUTO: 8 % (ref 4–12)
NEUTROPHILS # BLD AUTO: 4.28 THOUSANDS/ÂΜL (ref 1.85–7.62)
NEUTS SEG NFR BLD AUTO: 58 % (ref 43–75)
NITRITE UR QL STRIP: NEGATIVE
NRBC BLD AUTO-RTO: 0 /100 WBCS
P AXIS: 42 DEGREES
PH UR STRIP.AUTO: 6.5 [PH]
PLATELET # BLD AUTO: 380 THOUSANDS/UL (ref 149–390)
PMV BLD AUTO: 9.1 FL (ref 8.9–12.7)
POTASSIUM SERPL-SCNC: 3.7 MMOL/L (ref 3.5–5.3)
PR INTERVAL: 140 MS
PROT SERPL-MCNC: 7.4 G/DL (ref 6.4–8.4)
PROT UR STRIP-MCNC: NEGATIVE MG/DL
QRS AXIS: 69 DEGREES
QRSD INTERVAL: 90 MS
QT INTERVAL: 390 MS
QTC INTERVAL: 458 MS
RBC # BLD AUTO: 4.96 MILLION/UL (ref 3.81–5.12)
SODIUM SERPL-SCNC: 137 MMOL/L (ref 135–147)
SP GR UR STRIP.AUTO: 1.02
T WAVE AXIS: 28 DEGREES
UROBILINOGEN UR QL STRIP.AUTO: 0.2 E.U./DL
VENTRICULAR RATE: 83 BPM
WBC # BLD AUTO: 7.35 THOUSAND/UL (ref 4.31–10.16)

## 2025-04-23 PROCEDURE — 71045 X-RAY EXAM CHEST 1 VIEW: CPT

## 2025-04-23 PROCEDURE — 85025 COMPLETE CBC W/AUTO DIFF WBC: CPT

## 2025-04-23 PROCEDURE — 93005 ELECTROCARDIOGRAM TRACING: CPT

## 2025-04-23 PROCEDURE — 81025 URINE PREGNANCY TEST: CPT

## 2025-04-23 PROCEDURE — 99285 EMERGENCY DEPT VISIT HI MDM: CPT

## 2025-04-23 PROCEDURE — 36415 COLL VENOUS BLD VENIPUNCTURE: CPT

## 2025-04-23 PROCEDURE — 99284 EMERGENCY DEPT VISIT MOD MDM: CPT

## 2025-04-23 PROCEDURE — 80053 COMPREHEN METABOLIC PANEL: CPT

## 2025-04-23 PROCEDURE — 93010 ELECTROCARDIOGRAM REPORT: CPT | Performed by: INTERNAL MEDICINE

## 2025-04-23 PROCEDURE — 96360 HYDRATION IV INFUSION INIT: CPT

## 2025-04-23 PROCEDURE — 84484 ASSAY OF TROPONIN QUANT: CPT

## 2025-04-23 PROCEDURE — 81003 URINALYSIS AUTO W/O SCOPE: CPT

## 2025-04-23 RX ORDER — LABETALOL HYDROCHLORIDE 5 MG/ML
10 INJECTION, SOLUTION INTRAVENOUS ONCE
Status: DISCONTINUED | OUTPATIENT
Start: 2025-04-23 | End: 2025-04-23

## 2025-04-23 RX ADMIN — SODIUM CHLORIDE 500 ML: 0.9 INJECTION, SOLUTION INTRAVENOUS at 09:54

## 2025-04-23 NOTE — ED PROVIDER NOTES
Time reflects when diagnosis was documented in both MDM as applicable and the Disposition within this note       Time User Action Codes Description Comment    4/23/2025 11:06 AM Jw Hernadez Add [I10] Hypertension           ED Disposition       ED Disposition   Discharge    Condition   Stable    Date/Time   Wed Apr 23, 2025 10:40 AM    Comment   Marilin Morgan discharge to home/self care.                   Assessment & Plan       Medical Decision Making  Patient is a well-appearing 44-year-old female presenting with hypertension. She started with a productive cough last Wednesday and was seen at the [facility] on Thursday and diagnosed with acute bronchitis and started on azithromycin which she finished and has been taking OTC cold medicine since. BP was 163/90s last evening at home. 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 No focal neurodeficits on exam.  Plan is to obtain cardiac workup including ECG, troponin, and chest x-ray due to the chest tightness and to help evaluate for endorgan damage and rule out pneumonia. CBC to evaluate for leukocytosis or anemia and chemistry to evaluate for electrolyte abnormality, kidney failure, or glucose abnormality. UA to also help evaluate for renal failure. Urine pregnancy rule out pregnancy. She is not menopausal.  See ED course for interpretation of labs, imaging, and further medical decision making.   IV fluids for hydration. She appears dry on exam. Will repeat blood pressure and determine need hypertensive management. First /100 in the ED. Repeat BP in the 180s. Initially planned to proceed with the labetalol for hypertensive management but her blood pressure improved without treatment. No signs of endorgan damage or hypertensive emergency. Will discharge home with supportive care instructions and encourage close follow-up with PCP. Recommended that she continues recording  her blood pressure at home with a diary and avoids decongestants.  Dispo: Patient is safe/stable for discharge home and was discharged home with strict return precautions. Provided verbal and written supportive care instructions for managing her illness. Advised patient to return to the nearest emergency room if she has new or worsening symptoms or if any questions arise. Advised patient to follow-up with her family doctor. Patient is satisfied with care and agrees with management and plan.     Amount and/or Complexity of Data Reviewed  External Data Reviewed: labs, radiology and notes.  Labs: ordered. Decision-making details documented in ED Course.  Radiology: ordered and independent interpretation performed.  ECG/medicine tests: ordered and independent interpretation performed.    Risk  Prescription drug management.        ED Course as of 04/23/25 1251   Wed Apr 23, 2025   0924 Blood Pressure(!): 187/100  Elevated BP. Other vital signs reviewed and within normal limits.   0957 Repeat /91.    1006 WBC: 7.35  No leukocytosis.   1006 Hemoglobin: 13.8   1020 UA (URINE) with reflex to Scope  Negative for UTI/ketones/proteinuria.    1020 Comprehensive metabolic panel  Electrolytes WNL. No DARRON, glucose abnormality, or transaminitis.   1021 /74. Will hold on labetalol.   1039 hs TnI 0hr: 3  Will dc further troponins. No signs of endorgan damage.   1054 Reassessed patient went over results with her. /88 and no further symptoms. Discussed hypertension management at home and recommended that she makes an appointment with her PCP. She is comfortable with the plan.       Medications   sodium chloride 0.9 % bolus 500 mL (0 mL Intravenous Stopped 4/23/25 1058)       ED Risk Strat Scores   HEART Risk Score      Flowsheet Row Most Recent Value   Heart Score Risk Calculator    History 0 Filed at: 04/23/2025 0954   ECG 1 Filed at: 04/23/2025 0954   Age 1 Filed at: 04/23/2025 0954   Risk Factors 1 Filed at:  04/23/2025 0954   Troponin 0 Filed at: 04/23/2025 0954   HEART Score 3 Filed at: 04/23/2025 0954          HEART Risk Score      Flowsheet Row Most Recent Value   Heart Score Risk Calculator    History 0 Filed at: 04/23/2025 0954   ECG 1 Filed at: 04/23/2025 0954   Age 1 Filed at: 04/23/2025 0954   Risk Factors 1 Filed at: 04/23/2025 0954   Troponin 0 Filed at: 04/23/2025 0954   HEART Score 3 Filed at: 04/23/2025 0954                      No data recorded        SBIRT 22yo+      Flowsheet Row Most Recent Value   Initial Alcohol Screen: US AUDIT-C     1. How often do you have a drink containing alcohol? 0 Filed at: 04/23/2025 0922   2. How many drinks containing alcohol do you have on a typical day you are drinking?  0 Filed at: 04/23/2025 0922   3a. Male UNDER 65: How often do you have five or more drinks on one occasion? 0 Filed at: 04/23/2025 0922   3b. FEMALE Any Age, or MALE 65+: How often do you have 4 or more drinks on one occassion? 0 Filed at: 04/23/2025 0922   Audit-C Score 0 Filed at: 04/23/2025 0922   LISA: How many times in the past year have you...    Used an illegal drug or used a prescription medication for non-medical reasons? Never Filed at: 04/23/2025 0922                            History of Present Illness       Chief Complaint   Patient presents with    Hypertension       Past Medical History:   Diagnosis Date    COVID-19 vaccine administered     J & J in March 2021    Crohn disease (HCC)     Hypertension     IBS (irritable bowel syndrome)     Inflammatory bowel disease 2003    Crohns dx 11/2020    Kidney stone 06/2021    Lithotripsy done 7/2021    Obstruction of right ureteropelvic junction (UPJ) due to stone 6/18/2021    Obstruction of right ureteropelvic junction due to stone     PONV (postoperative nausea and vomiting)     Pt requests to be pre-medicated for N/V prior to sx    Psoriatic arthritis (HCC)     Wears glasses       Past Surgical History:   Procedure Laterality Date     CHOLECYSTECTOMY      ID CYSTOURETHROSCOPY W/URETERAL CATHETERIZATION Right 6/18/2021    Procedure: CYSTOSCOPY RETROGRADE PYELOGRAM WITH INSERTION STENT URETERAL;  Surgeon: Oren Jay MD;  Location:  MAIN OR;  Service: Urology    ID LITHOTRIPSY XTRCORP SHOCK WAVE Right 7/16/2021    Procedure: LITHOTRIPSY EXTRACORPORAL SHOCKWAVE (ESWL);  Surgeon: Oren Jay MD;  Location:  MAIN OR;  Service: Urology    TONSILLECTOMY        Family History   Problem Relation Age of Onset    Diabetes Mother     Hypertension Father     Heart attack Father     No Known Problems Maternal Grandmother     No Known Problems Maternal Grandfather     No Known Problems Paternal Grandmother     No Known Problems Paternal Grandfather     No Known Problems Maternal Aunt     No Known Problems Maternal Aunt     Breast cancer Paternal Aunt 41      Social History     Tobacco Use    Smoking status: Never     Passive exposure: Never    Smokeless tobacco: Never   Vaping Use    Vaping status: Never Used   Substance Use Topics    Alcohol use: Yes     Comment: Social drinker    Drug use: Never      E-Cigarette/Vaping    E-Cigarette Use Never User       E-Cigarette/Vaping Substances      I have reviewed and agree with the history as documented.     Patient is a 44-year-old female with relevant past medical history of COVID-19, Crohn disease, hypertension, IBS, IBD, kidney stone, and arthritis presenting with high blood pressure x 2 days. She started with a productive cough last Wednesday and was seen at the [facility] on Thursday and diagnosed with acute bronchitis and started on azithromycin which she finished and has been taking OTC cold medicine including DayQuil since. BP was 163/90s last evening at home around 8 PM (she had a headache at that time) and 150/90 this morning at home. 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 Her  cough is improving but she reports some chest tightness which she thinks is from the cough. She also reports her vision is a little more blurry than usual when she takes her glasses off. She reports good urine output. She denies F/C, headache, dizziness, chest pain, dyspnea, abdominal pain, or N/V.      History provided by:  Patient and parent (Mother.)  Hypertension  Associated symptoms: no abdominal pain, no chest pain, no confusion, no dizziness, no ear pain, no fever, no headaches, no hematuria, no nausea, no palpitations, no shortness of breath and not vomiting        Review of Systems   Constitutional:  Negative for chills and fever.   HENT:  Negative for congestion, ear pain, rhinorrhea and sore throat.    Eyes:  Negative for photophobia and pain.   Respiratory:  Positive for cough and chest tightness. Negative for shortness of breath.    Cardiovascular:  Negative for chest pain and palpitations.   Gastrointestinal:  Negative for abdominal pain, constipation, diarrhea, nausea and vomiting.   Genitourinary:  Negative for dysuria, frequency, hematuria and urgency.   Musculoskeletal:  Negative for arthralgias and back pain.   Skin:  Negative for color change and rash.   Neurological:  Negative for dizziness, seizures, syncope, light-headedness and headaches.   Psychiatric/Behavioral:  Negative for agitation and confusion.            Objective       ED Triage Vitals   Temperature Pulse Blood Pressure Respirations SpO2 Patient Position - Orthostatic VS   04/23/25 0923 04/23/25 0923 04/23/25 0923 04/23/25 0923 04/23/25 0923 04/23/25 1030   98.9 °F (37.2 °C) 89 (!) 187/100 18 98 % Sitting      Temp Source Heart Rate Source BP Location FiO2 (%) Pain Score    04/23/25 0923 04/23/25 0923 04/23/25 1030 -- --    Tympanic Monitor Left arm        Vitals      Date and Time Temp Pulse SpO2 Resp BP Pain Score FACES Pain Rating User   04/23/25 1100 98.5 °F (36.9 °C) 76 98 % 20 160/83 -- -- SG   04/23/25 1030 -- 84 99 % 20  156/88 -- -- SG   04/23/25 0923 98.9 °F (37.2 °C) 89 98 % 18 187/100 -- -- DK            Physical Exam  Vitals and nursing note reviewed.   Constitutional:       General: She is not in acute distress.     Appearance: She is well-developed. She is obese. She is not ill-appearing.   HENT:      Head: Normocephalic and atraumatic.      Mouth/Throat:      Mouth: Mucous membranes are dry.   Eyes:      Extraocular Movements: Extraocular movements intact.      Conjunctiva/sclera: Conjunctivae normal.      Pupils: Pupils are equal, round, and reactive to light.   Cardiovascular:      Rate and Rhythm: Normal rate and regular rhythm.      Heart sounds: No murmur heard.  Pulmonary:      Effort: Pulmonary effort is normal. No respiratory distress.      Breath sounds: Normal breath sounds. No wheezing, rhonchi or rales.   Abdominal:      Palpations: Abdomen is soft.      Tenderness: There is no abdominal tenderness. There is no guarding or rebound.   Musculoskeletal:         General: No swelling.      Cervical back: Neck supple.   Skin:     General: Skin is warm and dry.      Capillary Refill: Capillary refill takes less than 2 seconds.   Neurological:      General: No focal deficit present.      Mental Status: She is alert and oriented to person, place, and time.      GCS: GCS eye subscore is 4. GCS verbal subscore is 5. GCS motor subscore is 6.      Cranial Nerves: Cranial nerves 2-12 are intact.      Sensory: Sensation is intact.      Motor: Motor function is intact.      Coordination: Coordination is intact.      Gait: Gait is intact.   Psychiatric:         Mood and Affect: Mood normal.         Results Reviewed       Procedure Component Value Units Date/Time    POCT pregnancy, urine [[id number]]  (Normal) Collected: 04/23/25 1058    Lab Status: Final result Updated: 04/23/25 1058     EXT Preg Test, Ur Negative     Control Valid    HS Troponin 0hr (reflex protocol) [[id number]]  (Normal) Collected: 04/23/25 0954    Lab Status:  Final result Specimen: Blood from Arm, Left Updated: 04/23/25 1028     hs TnI 0hr 3 ng/L     Comprehensive metabolic panel [[id number]] Collected: 04/23/25 0954    Lab Status: Final result Specimen: Blood from Arm, Left Updated: 04/23/25 1020     Sodium 137 mmol/L      Potassium 3.7 mmol/L      Chloride 102 mmol/L      CO2 28 mmol/L      ANION GAP 7 mmol/L      BUN 11 mg/dL      Creatinine 0.74 mg/dL      Glucose 92 mg/dL      Calcium 8.8 mg/dL      AST 18 U/L      ALT 25 U/L      Alkaline Phosphatase 56 U/L      Total Protein 7.4 g/dL      Albumin 4.2 g/dL      Total Bilirubin 0.48 mg/dL      eGFR 97 ml/min/1.73sq m     Narrative:      National Kidney Disease Foundation guidelines for Chronic Kidney Disease (CKD):     Stage 1 with normal or high GFR (GFR > 90 mL/min/1.73 square meters)    Stage 2 Mild CKD (GFR = 60-89 mL/min/1.73 square meters)    Stage 3A Moderate CKD (GFR = 45-59 mL/min/1.73 square meters)    Stage 3B Moderate CKD (GFR = 30-44 mL/min/1.73 square meters)    Stage 4 Severe CKD (GFR = 15-29 mL/min/1.73 square meters)    Stage 5 End Stage CKD (GFR <15 mL/min/1.73 square meters)  Note: GFR calculation is accurate only with a steady state creatinine    UA (URINE) with reflex to Scope [[id number]]  (Normal) Collected: 04/23/25 0954    Lab Status: Final result Specimen: Urine, Clean Catch Updated: 04/23/25 1012     Color, UA Yellow     Clarity, UA Clear     Specific Gravity, UA 1.025     pH, UA 6.5     Leukocytes, UA Negative     Nitrite, UA Negative     Protein, UA Negative mg/dl      Glucose, UA Negative mg/dl      Ketones, UA Negative mg/dl      Urobilinogen, UA 0.2 E.U./dl      Bilirubin, UA Negative     Occult Blood, UA Negative    CBC and differential [[id number]] Collected: 04/23/25 0954    Lab Status: Final result Specimen: Blood from Arm, Left Updated: 04/23/25 1001     WBC 7.35 Thousand/uL      RBC 4.96 Million/uL      Hemoglobin 13.8 g/dL      Hematocrit 43.0 %      MCV 87 fL      MCH 27.8 pg       MCHC 32.1 g/dL      RDW 12.8 %      MPV 9.1 fL      Platelets 380 Thousands/uL      nRBC 0 /100 WBCs      Segmented % 58 %      Immature Grans % 0 %      Lymphocytes % 32 %      Monocytes % 8 %      Eosinophils Relative 1 %      Basophils Relative 1 %      Absolute Neutrophils 4.28 Thousands/µL      Absolute Immature Grans 0.03 Thousand/uL      Absolute Lymphocytes 2.35 Thousands/µL      Absolute Monocytes 0.57 Thousand/µL      Eosinophils Absolute 0.08 Thousand/µL      Basophils Absolute 0.04 Thousands/µL             X-ray chest 1 view portable   ED Interpretation by Jw Hernadez PA-C (04/23 1020)   No acute cardiopulmonary disease.      Final Interpretation by Christiane Mahajan MD (04/23 1031)      No acute cardiopulmonary disease.            Workstation performed: [id number]             ECG 12 Lead Documentation Only    Date/Time: 4/23/2025 9:50 AM    Performed by: Jw Hernadez PA-C  Authorized by: Jw Hernadez PA-C    Indications / Diagnosis:  Chest tightness.  ECG reviewed by me, the ED Provider: yes    Patient location:  ED  Previous ECG:     Comparison to cardiac monitor: Yes    Interpretation:     Interpretation: abnormal    Rate:     ECG rate:  83    ECG rate assessment: normal    Rhythm:     Rhythm: sinus rhythm    Ectopy:     Ectopy: none    QRS:     QRS axis:  Normal    QRS intervals:  Normal  Conduction:     Conduction: normal    ST segments:     ST segments:  Normal  T waves:     T waves: non-specific        ED Medication and Procedure Management   Prior to Admission Medications   Prescriptions Last Dose Informant Patient Reported? Taking?   Cholecalciferol (VITAMIN D3 PO)  Self Yes No   Sig: Take 4,000 Units by mouth   DULoxetine (CYMBALTA) 30 mg delayed release capsule   Yes No   Sig: Take 30 mg by mouth daily   ELDERBERRY PO  Self Yes No   Sig: Take by mouth   Mibelas 24 Fe 1-20 MG-MCG(24) CHEW  Self Yes No   Multiple Vitamin (multivitamin) tablet  Self Yes No   Sig: Take 1 tablet by  mouth daily   Risankizumab-rzaa (SKYRIZI IV)  Self Yes No   Sig: Inject as directed   Skyrizi 360 MG/2.4ML SOCT   Yes No   benzonatate (TESSALON PERLES) 100 mg capsule   No No   Sig: Take 1 capsule (100 mg total) by mouth 3 (three) times a day as needed for cough   cyanocobalamin (VITAMIN B-12) 100 mcg tablet   Yes No   Sig: Take 100 mcg by mouth daily   fluticasone (FLONASE) 50 mcg/act nasal spray  Self No No   Si sprays into each nostril daily   lisinopril (ZESTRIL) 40 mg tablet 2025 at  7:00 AM  No Yes   Sig: Take 1 tablet (40 mg total) by mouth daily   metoprolol succinate (TOPROL-XL) 25 mg 24 hr tablet 2025 at  7:00 AM  No Yes   Sig: Take 1 tablet (25 mg total) by mouth daily Please take half a tablet per day.   Patient taking differently: Take 12.5 mg by mouth daily Please take half a tablet per day.   omeprazole (PriLOSEC) 20 mg delayed release capsule  Self Yes No   Sig: daily   ondansetron (ZOFRAN) 4 mg tablet   No No   Sig: Take 1 tablet (4 mg total) by mouth every 8 (eight) hours as needed for nausea or vomiting   sucralfate (CARAFATE) 1 g tablet  Self Yes No   Si (two) times a day      Facility-Administered Medications: None     Discharge Medication List as of 2025 11:07 AM        CONTINUE these medications which have NOT CHANGED    Details   lisinopril (ZESTRIL) 40 mg tablet Take 1 tablet (40 mg total) by mouth daily, Starting Fri 1/3/2025, Until 2025, Normal      metoprolol succinate (TOPROL-XL) 25 mg 24 hr tablet Take 1 tablet (25 mg total) by mouth daily Please take half a tablet per day., Starting 2024, Until 2025, Normal      benzonatate (TESSALON PERLES) 100 mg capsule Take 1 capsule (100 mg total) by mouth 3 (three) times a day as needed for cough, Starting Thu 2025, Normal      Cholecalciferol (VITAMIN D3 PO) Take 4,000 Units by mouth, Historical Med      cyanocobalamin (VITAMIN B-12) 100 mcg tablet Take 100 mcg by mouth daily, Historical Med       DULoxetine (CYMBALTA) 30 mg delayed release capsule Take 30 mg by mouth daily, Historical Med      ELDERBERRY PO Take by mouth, Historical Med      fluticasone (FLONASE) 50 mcg/act nasal spray 2 sprays into each nostril daily, Starting Wed 2/28/2024, Normal      Mibelas 24 Fe 1-20 MG-MCG(24) CHEW Starting Wed 1/12/2022, Historical Med      Multiple Vitamin (multivitamin) tablet Take 1 tablet by mouth daily, Historical Med      omeprazole (PriLOSEC) 20 mg delayed release capsule daily, Starting Thu 7/14/2022, Historical Med      ondansetron (ZOFRAN) 4 mg tablet Take 1 tablet (4 mg total) by mouth every 8 (eight) hours as needed for nausea or vomiting, Starting Fri 1/31/2025, Normal      Risankizumab-rzaa (SKYRIZI IV) Inject as directed, Historical Med      Skyrizi 360 MG/2.4ML SOCT Historical Med      sucralfate (CARAFATE) 1 g tablet 2 (two) times a day, Starting Thu 1/19/2023, Historical Med           No discharge procedures on file.  ED SEPSIS DOCUMENTATION   Time reflects when diagnosis was documented in both MDM as applicable and the Disposition within this note       Time User Action Codes Description Comment    4/23/2025 11:06 AM Jw Hernadez Add [I10] Hypertension                  Jw Hernadez PA-C  04/23/25 3566

## 2025-04-23 NOTE — DISCHARGE INSTRUCTIONS
Immediately return to the emergency room if you experience any new or worsening symptoms or if the symptoms are lasting longer than expected.     Please schedule an appointment with your family doctor for follow-up. Continue taking and recording your blood pressures at home. Continue to watch your salt and diet.

## 2025-04-28 ENCOUNTER — OFFICE VISIT (OUTPATIENT)
Dept: FAMILY MEDICINE CLINIC | Facility: CLINIC | Age: 45
End: 2025-04-28
Payer: COMMERCIAL

## 2025-04-28 VITALS
SYSTOLIC BLOOD PRESSURE: 150 MMHG | HEIGHT: 60 IN | WEIGHT: 197 LBS | DIASTOLIC BLOOD PRESSURE: 108 MMHG | BODY MASS INDEX: 38.68 KG/M2 | TEMPERATURE: 98.9 F

## 2025-04-28 DIAGNOSIS — I10 PRIMARY HYPERTENSION: Primary | ICD-10-CM

## 2025-04-28 PROCEDURE — 99213 OFFICE O/P EST LOW 20 MIN: CPT | Performed by: FAMILY MEDICINE

## 2025-04-28 RX ORDER — METOPROLOL SUCCINATE 25 MG/1
25 TABLET, EXTENDED RELEASE ORAL DAILY
Qty: 30 TABLET | Refills: 6 | Status: SHIPPED | OUTPATIENT
Start: 2025-04-28

## 2025-04-28 NOTE — PROGRESS NOTES
Assessment/Plan:    No problem-specific Assessment & Plan notes found for this encounter.       Diagnoses and all orders for this visit:    Primary hypertension  Comments:  keep appointment with cardiology  Orders:  -     metoprolol succinate (TOPROL-XL) 25 mg 24 hr tablet; Take 1 tablet (25 mg total) by mouth daily          PHQ-2/9 Depression Screening    Little interest or pleasure in doing things: 0 - not at all  Feeling down, depressed, or hopeless: 0 - not at all  PHQ-2 Score: 0  PHQ-2 Interpretation: Negative depression screen            Subjective:      Patient ID: Marilin Morgan is a 44 y.o. female.    Hypertension  This is a chronic problem. The current episode started more than 1 year ago. The problem is unchanged. The problem is uncontrolled. Associated symptoms include headaches. Pertinent negatives include no chest pain, palpitations or shortness of breath.       The following portions of the patient's history were reviewed and updated as appropriate: allergies, current medications, past family history, past medical history, past social history, past surgical history, and problem list.    Review of Systems   Eyes:  Negative for visual disturbance.   Respiratory:  Negative for shortness of breath and wheezing.    Cardiovascular:  Negative for chest pain and palpitations.   Neurological:  Positive for headaches.         Objective:    BP (!) 150/108   Temp 98.9 °F (37.2 °C) (Tympanic)   Ht 5' (1.524 m)   Wt 89.4 kg (197 lb)   BMI 38.47 kg/m²      Physical Exam  Vitals and nursing note reviewed.   Constitutional:       General: She is not in acute distress.     Appearance: Normal appearance. She is not ill-appearing, toxic-appearing or diaphoretic.   HENT:      Head: Normocephalic and atraumatic.   Eyes:      Conjunctiva/sclera: Conjunctivae normal.   Cardiovascular:      Rate and Rhythm: Normal rate and regular rhythm.      Heart sounds: Normal heart sounds. No murmur heard.  Pulmonary:      Effort:  Pulmonary effort is normal. No respiratory distress.      Breath sounds: Normal breath sounds. No wheezing, rhonchi or rales.   Abdominal:      General: There is no distension.      Palpations: Abdomen is soft. There is no mass.      Tenderness: There is no abdominal tenderness. There is no guarding or rebound.   Musculoskeletal:      Cervical back: Normal range of motion and neck supple. No rigidity.      Right lower leg: No edema.      Left lower leg: No edema.   Lymphadenopathy:      Cervical: No cervical adenopathy.   Neurological:      Mental Status: She is alert and oriented to person, place, and time.   Psychiatric:         Mood and Affect: Mood normal.         Behavior: Behavior normal.         Thought Content: Thought content normal.         Judgment: Judgment normal.

## 2025-05-07 ENCOUNTER — APPOINTMENT (OUTPATIENT)
Dept: RADIOLOGY | Facility: CLINIC | Age: 45
End: 2025-05-07
Payer: COMMERCIAL

## 2025-05-07 ENCOUNTER — OFFICE VISIT (OUTPATIENT)
Dept: URGENT CARE | Facility: CLINIC | Age: 45
End: 2025-05-07
Payer: COMMERCIAL

## 2025-05-07 ENCOUNTER — DOCUMENTATION (OUTPATIENT)
Dept: ADMINISTRATIVE | Facility: OTHER | Age: 45
End: 2025-05-07

## 2025-05-07 VITALS
SYSTOLIC BLOOD PRESSURE: 145 MMHG | OXYGEN SATURATION: 97 % | HEART RATE: 105 BPM | TEMPERATURE: 98.2 F | RESPIRATION RATE: 20 BRPM | DIASTOLIC BLOOD PRESSURE: 92 MMHG

## 2025-05-07 DIAGNOSIS — R05.1 ACUTE COUGH: ICD-10-CM

## 2025-05-07 DIAGNOSIS — J20.9 ACUTE BRONCHITIS, UNSPECIFIED ORGANISM: Primary | ICD-10-CM

## 2025-05-07 PROCEDURE — S9088 SERVICES PROVIDED IN URGENT: HCPCS

## 2025-05-07 PROCEDURE — 99214 OFFICE O/P EST MOD 30 MIN: CPT

## 2025-05-07 PROCEDURE — 71046 X-RAY EXAM CHEST 2 VIEWS: CPT

## 2025-05-07 RX ORDER — PREDNISONE 10 MG/1
TABLET ORAL
Qty: 26 TABLET | Refills: 0 | Status: SHIPPED | OUTPATIENT
Start: 2025-05-07

## 2025-05-07 RX ORDER — BENZONATATE 200 MG/1
200 CAPSULE ORAL 3 TIMES DAILY PRN
Qty: 20 CAPSULE | Refills: 0 | Status: SHIPPED | OUTPATIENT
Start: 2025-05-07

## 2025-05-07 RX ORDER — FLUTICASONE PROPIONATE 50 MCG
2 SPRAY, SUSPENSION (ML) NASAL DAILY
Qty: 15.8 ML | Refills: 0 | Status: SHIPPED | OUTPATIENT
Start: 2025-05-07

## 2025-05-07 RX ORDER — ALBUTEROL SULFATE 90 UG/1
2 INHALANT RESPIRATORY (INHALATION) EVERY 6 HOURS PRN
Qty: 8.5 G | Refills: 0 | Status: SHIPPED | OUTPATIENT
Start: 2025-05-07

## 2025-05-07 NOTE — PROGRESS NOTES
[facility]        NAME: Marilin Morgan is a 44 y.o. female  : 1980    MRN: [id number]  DATE: May 7, 2025  TIME: 12:05 PM    Assessment and Plan   Acute bronchitis, unspecified organism [J20.9]  1. Acute bronchitis, unspecified organism  predniSONE 10 mg tablet    albuterol (ProAir HFA) 90 mcg/act inhaler    amoxicillin-clavulanate (AUGMENTIN) 875-125 mg per tablet      2. Acute cough  XR chest pa and lateral    benzonatate (TESSALON) 200 MG capsule        Chest XR obtained in office. XR imaging reviewed, compared to prior imaging 2025 and negative for any acute cardiopulmonary abnormality or consolidation. Pending final read from radiology.  Given length and persistence of symptoms, will treat with Augmentin.  Will also start on short course of steroids, Tessalon and albuterol as needed for cough. Recommend supportive care with OTC Tylenol, Flonase and Mucinex.  Instructed patient to rest and increase fluid intake. Instructed patient to follow-up with PCP for no improvement or worsening of symptoms.  Patient educated on red flag symptoms and when to proceed to the ED.  Patient agreeable and understands current treatment plan.     Patient Instructions     Patient Instructions   Please take Augmentin twice daily for the next 7 days.    Please start a Prednisone taper daily as directed.  Please take steroids with food and do not take any Ibuprofen or other NSAID containing medications as this may increase the risk for GI bleeding. You may take Tylenol if needed.      You may take Tessalon three times daily as needed for cough.   You may use Albuterol inhaler every 6 hours as needed for cough, wheezing, shortness of breath or chest tightness.     While sick, make sure you get lots of rest and increase your fluid intake.   You may use OTC nasal saline spray, Flonase, and Mucinex for mild congestion.   Take Tylenol for fever, mild pain, and/or body aches.  Perform salt water gargles, drink warm tea  with honey, and use throat lozenges and Chloraseptic spray for sore throat.    You can also apply warm compresses over your sinuses for any sinus pressure.     While you are sick, taking vitamins may help boost your immune system and potentially aid in recovery by supporting your body's natural defense mechanisms: Vitamin D3 2000 IU daily, Vitamin C 1000mg daily , and Multivitamin daily.    We have performed some tests on you during your visit with us. Our office will contact you with these results only if changes need to be made to the care plan previously discussed with you at your visit. You can review your results on [email or web address] [facility]. Please don't hesitate to call if you have any questions regarding your results and/or treatment.    Antibiotics are medicines that treat bacterial infections by either killing bacteria or preventing them from growing. It is important to take the full course of your antibiotics as prescribed to kill the bacteria causing your infection. Stopping your antibiotics early could lead to reinfection and can also promote the spread of antibiotic resistant bacteria. Some antibiotics may cause certain side effects including: nausea, vomiting, diarrhea, bloating, indigestion, belly pain, and/or loss of appetite. Eating yogurt with live and active cultures and/or taking a probiotic and maintaining a healthy diet can help to reduce some of these side effects.     If your symptoms do not improve with our current treatment plan or worsen, please schedule an appointment with your PCP. If you develop any high or persistent fevers, chest pain, palpitations, shortness of breath, difficulty swallowing, decreased urine output, abdominal pain, dizziness or any other concerning symptoms, please proceed to the ED.       Follow up with PCP in 3-5 days.  Proceed to  ER if symptoms worsen.    Chief Complaint     Chief Complaint   Patient presents with    Cold Like Symptoms    Cough    Fever    Sore Throat      3 weeks          History of Present Illness       44-year-old female presents to the clinic for evaluation of cough x 2 weeks.  Patient describes the cough as productive and strong in nature.  She also reports associated symptoms including fevers, chills, right ear pain, postnasal drip, sore throat, mild shortness of breath with exertion, occasional wheezing, and bodyaches.  Patient does also report 1 episode of dizziness with nonbloody vomiting over the weekend.  Patient reports her symptoms have been slightly improving however they are still not resolved.  Of note, she was seen at urgent care for similar symptoms on 4/17/2025.  She was diagnosed with acute bronchitis and placed on azithromycin and Tessalon.  She reports she took her antibiotics as prescribed and states she felt a little better however they did not completely take her symptoms away.  Of note, she was also seen in the ED on 4/23/2025 for hypertension.  She was placed on metoprolol by her PCP on 4/28/2025.  She reports taking OTC Coricidin for high blood pressure and a daily allergy pill.  To add, she does report a history of Crohn's and she currently takes Skyrizi.        Fever - 9 weeks to 74 years   The current episode started in the past 7 days. The maximum temperature noted was 102 to 102.9 F. The temperature was taken using an oral thermometer. Associated symptoms include coughing, ear pain (right side), muscle aches, nausea, a sore throat (mild irritation), vomiting and wheezing (mild, occasional). Pertinent negatives include no abdominal pain, chest pain, congestion, diarrhea or headaches.   Risk factors: hx of cancer, immunosuppression and recent sickness    Risk factors: no contaminated food, no contaminated water, no occupational exposure, no recent travel and no sick contacts        Review of Systems   Review of Systems   Constitutional:  Positive for chills and fever (102). Negative for appetite change.   HENT:  Positive for ear pain  (right side), postnasal drip and sore throat (mild irritation). Negative for congestion and rhinorrhea.    Respiratory:  Positive for cough, shortness of breath (mild) and wheezing (mild, occasional). Negative for chest tightness.    Cardiovascular:  Negative for chest pain and palpitations.   Gastrointestinal:  Positive for nausea and vomiting. Negative for abdominal pain and diarrhea.   Genitourinary:  Negative for decreased urine volume.   Musculoskeletal:  Positive for arthralgias and myalgias.   Neurological:  Positive for dizziness. Negative for headaches.         Current Medications       Current Outpatient Medications:     albuterol (ProAir HFA) 90 mcg/act inhaler, Inhale 2 puffs every 6 (six) hours as needed for wheezing, Disp: 8.5 g, Rfl: 0    amoxicillin-clavulanate (AUGMENTIN) 875-125 mg per tablet, Take 1 tablet by mouth every 12 (twelve) hours for 7 days, Disp: 14 tablet, Rfl: 0    benzonatate (TESSALON) 200 MG capsule, Take 1 capsule (200 mg total) by mouth 3 (three) times a day as needed for cough, Disp: 20 capsule, Rfl: 0    Cholecalciferol (VITAMIN D3 PO), Take 4,000 Units by mouth, Disp: , Rfl:     cyanocobalamin (VITAMIN B-12) 100 mcg tablet, Take 100 mcg by mouth daily, Disp: , Rfl:     DULoxetine (CYMBALTA) 30 mg delayed release capsule, Take 30 mg by mouth daily, Disp: , Rfl:     ELDERBERRY PO, Take by mouth, Disp: , Rfl:     fluticasone (FLONASE) 50 mcg/act nasal spray, 2 sprays into each nostril daily, Disp: 16 g, Rfl: 3    lisinopril (ZESTRIL) 40 mg tablet, Take 1 tablet (40 mg total) by mouth daily, Disp: 30 tablet, Rfl: 5    metoprolol succinate (TOPROL-XL) 25 mg 24 hr tablet, Take 1 tablet (25 mg total) by mouth daily, Disp: 30 tablet, Rfl: 6    Mibelas 24 Fe 1-20 MG-MCG(24) CHEW, , Disp: , Rfl:     Multiple Vitamin (multivitamin) tablet, Take 1 tablet by mouth daily, Disp: , Rfl:     omeprazole (PriLOSEC) 20 mg delayed release capsule, daily, Disp: , Rfl:     predniSONE 10 mg tablet,  Take 3 tablets BID x 2 days, Take 2 tablets BID x 2 days, Take 1 tablet BID x 2 days, Take 1 tablet daily x 2 days, Disp: 26 tablet, Rfl: 0    Risankizumab-rzaa (SKYRIZI IV), Inject as directed, Disp: , Rfl:     Skyrizi 360 MG/2.4ML SOCT, , Disp: , Rfl:     sucralfate (CARAFATE) 1 g tablet, 2 (two) times a day, Disp: , Rfl:     benzonatate (TESSALON PERLES) 100 mg capsule, Take 1 capsule (100 mg total) by mouth 3 (three) times a day as needed for cough (Patient not taking: Reported on 5/7/2025), Disp: 20 capsule, Rfl: 0    ondansetron (ZOFRAN) 4 mg tablet, Take 1 tablet (4 mg total) by mouth every 8 (eight) hours as needed for nausea or vomiting (Patient not taking: Reported on 5/7/2025), Disp: 20 tablet, Rfl: 0    Current Allergies     Allergies as of 05/07/2025    (No Known Allergies)            The following portions of the patient's history were reviewed and updated as appropriate: allergies, current medications, past family history, past medical history, past social history, past surgical history and problem list.     Past Medical History:   Diagnosis Date    COVID-19 vaccine administered     J & J in March 2021    Crohn disease (HCC)     Hypertension     IBS (irritable bowel syndrome)     Inflammatory bowel disease 2003    Crohns dx 11/2020    Kidney stone 06/2021    Lithotripsy done 7/2021    Obstruction of right ureteropelvic junction (UPJ) due to stone 6/18/2021    Obstruction of right ureteropelvic junction due to stone     PONV (postoperative nausea and vomiting)     Pt requests to be pre-medicated for N/V prior to sx    Psoriatic arthritis (HCC)     Wears glasses        Past Surgical History:   Procedure Laterality Date    CHOLECYSTECTOMY      KY CYSTOURETHROSCOPY W/URETERAL CATHETERIZATION Right 6/18/2021    Procedure: CYSTOSCOPY RETROGRADE PYELOGRAM WITH INSERTION STENT URETERAL;  Surgeon: Oren Jay MD;  Location:  MAIN OR;  Service: Urology    KY LITHOTRIPSY XTRCORP SHOCK WAVE Right 7/16/2021     Procedure: LITHOTRIPSY EXTRACORPORAL SHOCKWAVE (ESWL);  Surgeon: Oren Jay MD;  Location: [facility];  Service: Urology    TONSILLECTOMY         Family History   Problem Relation Age of Onset    Diabetes Mother     Hypertension Father     Heart attack Father     No Known Problems Maternal Grandmother     No Known Problems Maternal Grandfather     No Known Problems Paternal Grandmother     No Known Problems Paternal Grandfather     No Known Problems Maternal Aunt     No Known Problems Maternal Aunt     Breast cancer Paternal Aunt 41         Medications have been verified.        Objective   /92 (BP Location: Right arm)   Pulse 105   Temp 98.2 °F (36.8 °C)   Resp 20   SpO2 97%        Physical Exam     Physical Exam  Vitals and nursing note reviewed.   Constitutional:       Appearance: Normal appearance.   HENT:      Head: Normocephalic and atraumatic.      Right Ear: Ear canal and external ear normal. A middle ear effusion is present.      Left Ear: Ear canal and external ear normal. A middle ear effusion is present.      Nose: Nose normal. No congestion or rhinorrhea.      Mouth/Throat:      Mouth: Mucous membranes are moist.      Pharynx: Oropharyngeal exudate present. No posterior oropharyngeal erythema.   Eyes:      General:         Right eye: No discharge.         Left eye: No discharge.      Conjunctiva/sclera: Conjunctivae normal.   Cardiovascular:      Rate and Rhythm: Regular rhythm. Tachycardia present.      Heart sounds: Normal heart sounds.   Pulmonary:      Effort: Pulmonary effort is normal.      Breath sounds: Normal breath sounds. No stridor. No wheezing, rhonchi or rales.      Comments: Strong, moist cough noted on exam  Skin:     General: Skin is warm and dry.   Neurological:      General: No focal deficit present.      Mental Status: She is alert.   Psychiatric:         Mood and Affect: Mood normal.         Behavior: Behavior normal.

## 2025-05-07 NOTE — PROGRESS NOTES
Urgent Care BP  Received: Today  Gina Hobbs RN  P Patient Reported Team         Blood pressure elevated  Appointment department: [facility] [place]  Appointment provider: DIANE Gillespie  Blood pressure  05/07/25 1149 145/92  05/07/25 1147 168/93  Active  Date User Comment   05/07/25 1149 Gina Hobbs RN [[id number]] None

## 2025-05-07 NOTE — PATIENT INSTRUCTIONS
Please take Augmentin twice daily for the next 7 days.    Please start a Prednisone taper daily as directed.  Please take steroids with food and do not take any Ibuprofen or other NSAID containing medications as this may increase the risk for GI bleeding. You may take Tylenol if needed.      You may take Tessalon three times daily as needed for cough.   You may use Albuterol inhaler every 6 hours as needed for cough, wheezing, shortness of breath or chest tightness.     While sick, make sure you get lots of rest and increase your fluid intake.   You may use OTC nasal saline spray, Flonase, and Mucinex for mild congestion.   Take Tylenol for fever, mild pain, and/or body aches.  Perform salt water gargles, drink warm tea with honey, and use throat lozenges and Chloraseptic spray for sore throat.    You can also apply warm compresses over your sinuses for any sinus pressure.     While you are sick, taking vitamins may help boost your immune system and potentially aid in recovery by supporting your body's natural defense mechanisms: Vitamin D3 2000 IU daily, Vitamin C 1000mg daily , and Multivitamin daily.    We have performed some tests on you during your visit with us. Our office will contact you with these results only if changes need to be made to the care plan previously discussed with you at your visit. You can review your results on [facility]. Please don't hesitate to call if you have any questions regarding your results and/or treatment.    Antibiotics are medicines that treat bacterial infections by either killing bacteria or preventing them from growing. It is important to take the full course of your antibiotics as prescribed to kill the bacteria causing your infection. Stopping your antibiotics early could lead to reinfection and can also promote the spread of antibiotic resistant bacteria. Some antibiotics may cause certain side effects including: nausea, vomiting, diarrhea, bloating, indigestion,  belly pain, and/or loss of appetite. Eating yogurt with live and active cultures and/or taking a probiotic and maintaining a healthy diet can help to reduce some of these side effects.     If your symptoms do not improve with our current treatment plan or worsen, please schedule an appointment with your PCP. If you develop any high or persistent fevers, chest pain, palpitations, shortness of breath, difficulty swallowing, decreased urine output, abdominal pain, dizziness or any other concerning symptoms, please proceed to the ED.

## 2025-05-07 NOTE — LETTER
May 7, 2025     Patient: Marilin Morgan   YOB: 1980   Date of Visit: 5/7/2025       To Whom it May Concern:    Marilin Morgan was seen in my clinic on 5/7/2025. Please excuse from work 5/6/2025-5/7/2025. She may return to work on 5/8/2025 .    If you have any questions or concerns, please don't hesitate to call.         Sincerely,          DIANE Gillespie        CC: No Recipients

## 2025-05-08 ENCOUNTER — CONSULT (OUTPATIENT)
Dept: CARDIOLOGY CLINIC | Facility: CLINIC | Age: 45
End: 2025-05-08
Attending: FAMILY MEDICINE
Payer: COMMERCIAL

## 2025-05-08 VITALS
HEART RATE: 100 BPM | HEIGHT: 60 IN | BODY MASS INDEX: 38.68 KG/M2 | SYSTOLIC BLOOD PRESSURE: 140 MMHG | DIASTOLIC BLOOD PRESSURE: 90 MMHG | WEIGHT: 197 LBS

## 2025-05-08 DIAGNOSIS — E78.5 HYPERLIPIDEMIA, UNSPECIFIED HYPERLIPIDEMIA TYPE: ICD-10-CM

## 2025-05-08 DIAGNOSIS — R00.0 TACHYCARDIA: Primary | ICD-10-CM

## 2025-05-08 DIAGNOSIS — Z82.49 FAMILY HISTORY OF EARLY CAD: ICD-10-CM

## 2025-05-08 DIAGNOSIS — Z82.49 FAMILY HISTORY OF CABG: ICD-10-CM

## 2025-05-08 DIAGNOSIS — E66.09 CLASS 2 OBESITY DUE TO EXCESS CALORIES WITHOUT SERIOUS COMORBIDITY WITH BODY MASS INDEX (BMI) OF 38.0 TO 38.9 IN ADULT: ICD-10-CM

## 2025-05-08 DIAGNOSIS — I10 PRIMARY HYPERTENSION: ICD-10-CM

## 2025-05-08 DIAGNOSIS — E66.812 CLASS 2 OBESITY DUE TO EXCESS CALORIES WITHOUT SERIOUS COMORBIDITY WITH BODY MASS INDEX (BMI) OF 38.0 TO 38.9 IN ADULT: ICD-10-CM

## 2025-05-08 PROCEDURE — 99204 OFFICE O/P NEW MOD 45 MIN: CPT | Performed by: INTERNAL MEDICINE

## 2025-05-08 RX ORDER — METOPROLOL SUCCINATE 50 MG/1
50 TABLET, EXTENDED RELEASE ORAL DAILY
Qty: 90 TABLET | Refills: 1 | Status: SHIPPED | OUTPATIENT
Start: 2025-05-08

## 2025-05-08 NOTE — PROGRESS NOTES
05/08/25 3:23 PM    Patient was called after the Urgent Care visit ; a message was left for the patient to return the call    Thank you.  Imani Feliz  [facility]

## 2025-05-08 NOTE — ASSESSMENT & PLAN NOTE
Mildly elevated, 140/90  Currently on lisinopril 40 mg daily, metoprolol succinate 25 g daily  Increase metoprolol succinate to 50 mg daily  Low-salt diet  Weight loss recommended

## 2025-05-08 NOTE — PROGRESS NOTES
[facility] [address] [address] [phone]                                              Cardiology Office Follow up  Marilin Morgan, 44 y.o. female  YOB: 1980  MRN: [id number] Encounter: [id number]      PCP - Don Thakkar DO  Referring Provider - Don Thakkar*    Chief Complaint   Patient presents with    Rapid Heart Rate    Hypertension       Assessment  Tachycardia  Hypertension  Hyperlipidemia  Obesity, Body mass index is 38.47 kg/m².     Plan  Assessment & Plan  Tachycardia  Overview  HR has been persistently mildly elevated in 90-120s over the last 2-3 years, but remains sinus rhythm  No major palpitations, dizziness  She has been initiated on cymbalta and had dosage increased in 12/2024  Holter monitor 10/2023:  Normal sinus rhythm with average rate of 95 bpm  Risk factor assessment  TSH, hemoglobin within normal limits  Caffeine use - single cup  Rare alcohol use  Impression  Mild sinus tachycardia, mostly asymptomatic, likely related to combination of obesity/deconditioning with possible mild relative effect from autonomic instability related to Cymbalta  Plan  Increase metoprolol succinate to 50 mg daily  Counseled regarding need to increase walking and pursue weight loss of at least 20 pounds, eventually target BMI less than 28  Limit caffeine intake  Stress reduction  Increase cardio exercises  Primary hypertension  Mildly elevated, 140/90  Currently on lisinopril 40 mg daily, metoprolol succinate 25 g daily  Increase metoprolol succinate to 50 mg daily  Low-salt diet  Weight loss recommended  Family history of early CAD  Primary concern Brother had heart attack and CABG at age of 47  Additional concerns: Both parents had heart attack in their mid 60s  Recommend cardiac restratification  Check exercise ECG stress test  If negative, will benefit from CT coronary calcium score to risk stratify  further  Optimize cardiac risk factors -lipids, obesity  Increase cardio-exercises and monitor for symptoms of chest pain  Family history of CABG  Father and brother had CABG  See above  Hyperlipidemia, unspecified hyperlipidemia type    Cholesterol levels have been mildly elevated with LDL between [id number] previously  No recent lipid panel available  Diet modifications, weight loss recommended  Check repeat lipid panel prior to next office visit   Class 2 obesity due to excess calories without serious comorbidity with body mass index (BMI) of 38.0 to 38.9 in adult        No results found for this visit on 05/08/25.    Orders Placed This Encounter   Procedures    CT coronary calcium score    Lipid Panel with Direct LDL reflex    Stress test only, exercise       No follow-ups on file.      History of Present Illness   44 y.o. female , who works as a registered behavioral tech, comes in as a new patient for consultation regarding    She was down to 170 lbs with a keto diet , but then then had crohn's and couldn't tolerate fiber and with dietary restriction eventually ended up gaining weight.  Over the last 3 years she has gained about 10 pounds, but is otherwise relatively stable.    She denies any symptoms of chest pain, shortness of breath, palpitations or dizziness.      Family history  Father - heart attack in mid-60s --> s/p CABGx2  Mother - heart attack in mid-60s --> s/p PCI with stent  Siblings: 1 Older Brother - heart attack at 47 --> s/p CABGx1    Historical Information   Past Medical History:   Diagnosis Date    COVID-19 vaccine administered     J & J in March 2021    Crohn disease (HCC)     Hypertension     IBS (irritable bowel syndrome)     Inflammatory bowel disease 2003    Crohns dx 11/2020    Kidney stone 06/2021    Lithotripsy done 7/2021    Obstruction of right ureteropelvic junction (UPJ) due to stone 6/18/2021    Obstruction of right ureteropelvic junction due to stone     PONV (postoperative  nausea and vomiting)     Pt requests to be pre-medicated for N/V prior to sx    Psoriatic arthritis (HCC)     Wears glasses      Past Surgical History:   Procedure Laterality Date    CHOLECYSTECTOMY      NM CYSTOURETHROSCOPY W/URETERAL CATHETERIZATION Right 6/18/2021    Procedure: CYSTOSCOPY RETROGRADE PYELOGRAM WITH INSERTION STENT URETERAL;  Surgeon: Oren Jay MD;  Location:  MAIN OR;  Service: Urology    NM LITHOTRIPSY XTRCORP SHOCK WAVE Right 7/16/2021    Procedure: LITHOTRIPSY EXTRACORPORAL SHOCKWAVE (ESWL);  Surgeon: Oren Jay MD;  Location:  MAIN OR;  Service: Urology    TONSILLECTOMY       Family History   Problem Relation Age of Onset    Diabetes Mother     Hypertension Father     Heart attack Father     No Known Problems Maternal Grandmother     No Known Problems Maternal Grandfather     No Known Problems Paternal Grandmother     No Known Problems Paternal Grandfather     No Known Problems Maternal Aunt     No Known Problems Maternal Aunt     Breast cancer Paternal Aunt 41     Current Outpatient Medications   Medication Instructions    albuterol (ProAir HFA) 90 mcg/act inhaler 2 puffs, Inhalation, Every 6 hours PRN    amoxicillin-clavulanate (AUGMENTIN) 875-125 mg per tablet 1 tablet, Oral, Every 12 hours scheduled    benzonatate (TESSALON PERLES) 100 mg, Oral, 3 times daily PRN    benzonatate (TESSALON) 200 mg, Oral, 3 times daily PRN    Cholecalciferol (VITAMIN D3 PO) 4,000 Units    cyanocobalamin (VITAMIN B-12) 100 mcg, Daily    DULoxetine (CYMBALTA) 30 mg, Daily    ELDERBERRY PO Take by mouth    fluticasone (FLONASE) 50 mcg/act nasal spray 2 sprays, Nasal, Daily    fluticasone (FLONASE) 50 mcg/act nasal spray 2 sprays, Nasal, Daily    lisinopril (ZESTRIL) 40 mg, Oral, Daily    metoprolol succinate (TOPROL-XL) 50 mg, Oral, Daily    Mibelas 24 Fe 1-20 MG-MCG(24) CHEW No dose, route, or frequency recorded.    Multiple Vitamin (multivitamin) tablet 1 tablet, Daily    omeprazole (PriLOSEC) 20 mg  delayed release capsule Daily    ondansetron (ZOFRAN) 4 mg, Oral, Every 8 hours PRN    predniSONE 10 mg tablet Take 3 tablets BID x 2 days, Take 2 tablets BID x 2 days, Take 1 tablet BID x 2 days, Take 1 tablet daily x 2 days    Risankizumab-rzaa (SKYRIZI IV) Inject as directed    Skyrizi 360 MG/2.4ML SOCT     sucralfate (CARAFATE) 1 g tablet 2 times daily      No Known Allergies  Social History     Socioeconomic History    Marital status: /Civil Union     Spouse name: None    Number of children: None    Years of education: None    Highest education level: None   Occupational History    None   Tobacco Use    Smoking status: Never     Passive exposure: Never    Smokeless tobacco: Never   Vaping Use    Vaping status: Never Used   Substance and Sexual Activity    Alcohol use: Yes     Comment: Social drinker    Drug use: Never    Sexual activity: Yes     Partners: Male     Birth control/protection: OCP   Other Topics Concern    None   Social History Narrative    None     Social Drivers of Health     Financial Resource Strain: Not on file   Food Insecurity: Not on file   Transportation Needs: Not on file   Physical Activity: Not on file   Stress: Not on file   Social Connections: Unknown (6/18/2024)    Received from [facility]     How often do you feel lonely or isolated from those around you? (Adult - for ages 18 years and over): Not on file   Intimate Partner Violence: Not on file   Housing Stability: Not on file        Review of Systems   All other systems reviewed and are negative.        Vitals:  Vitals:    05/08/25 1502   BP: 140/90   Pulse: 100   Weight: 89.4 kg (197 lb)   Height: 5' (1.524 m)     BMI - Body mass index is 38.47 kg/m².  Wt Readings from Last 7 Encounters:   05/08/25 89.4 kg (197 lb)   04/28/25 89.4 kg (197 lb)   03/31/25 89.7 kg (197 lb 12.8 oz)   02/03/25 88.9 kg (196 lb)   10/18/24 89.8 kg (198 lb)   09/05/24 86.2 kg (190 lb)   08/29/24 88.5 kg (195 lb)       Physical  Exam  Vitals and nursing note reviewed.   Constitutional:       General: She is not in acute distress.     Appearance: Normal appearance. She is well-developed. She is obese. She is not ill-appearing.   HENT:      Head: Normocephalic and atraumatic.      Nose: No congestion.   Eyes:      General: No scleral icterus.     Conjunctiva/sclera: Conjunctivae normal.   Neck:      Vascular: No carotid bruit or JVD.   Cardiovascular:      Rate and Rhythm: Regular rhythm. Tachycardia present.      Pulses: Normal pulses.      Heart sounds: Normal heart sounds. No murmur heard.     No friction rub. No gallop.   Pulmonary:      Effort: Pulmonary effort is normal. No respiratory distress.      Breath sounds: Normal breath sounds. No rales.   Abdominal:      General: There is no distension.      Palpations: Abdomen is soft.      Tenderness: There is no abdominal tenderness.   Musculoskeletal:         General: No swelling or tenderness.      Cervical back: Neck supple.      Right lower leg: No edema.      Left lower leg: No edema.   Skin:     General: Skin is warm.   Neurological:      General: No focal deficit present.      Mental Status: She is alert and oriented to person, place, and time. Mental status is at baseline.   Psychiatric:         Mood and Affect: Mood normal.         Behavior: Behavior normal.         Thought Content: Thought content normal.           Labs:  CBC:   Lab Results   Component Value Date    WBC 7.35 04/23/2025    RBC 4.96 04/23/2025    HGB 13.8 04/23/2025    HCT 43.0 04/23/2025    MCV 87 04/23/2025     04/23/2025    RDW 12.8 04/23/2025       CMP:   Lab Results   Component Value Date    K 3.7 04/23/2025     04/23/2025    CO2 28 04/23/2025    BUN 11 04/23/2025    CREATININE 0.74 04/23/2025    EGFR 97 04/23/2025    CALCIUM 8.8 04/23/2025    AST 18 04/23/2025    ALT 25 04/23/2025    ALKPHOS 56 04/23/2025       Magnesium:  Lab Results   Component Value Date    MG 1.9 06/18/2021       Lipid  "Profile:   Lab Results   Component Value Date    HDL 53 07/13/2023    TRIG 192 (H) 07/13/2023    LDLCALC 113 (H) 07/13/2023       Thyroid Studies:   Lab Results   Component Value Date    QUD4RMYGCMYM 2.674 07/11/2024    FREET4 1.04 07/12/2022       A1c:  No components found for: \"HGA1C\"    INR:  Lab Results   Component Value Date    INR 0.96 05/11/2023   5    Cardiac testing:   No results found for this or any previous visit.    No results found for this or any previous visit.    No results found for this or any previous visit.    No results found for this or any previous visit.      XR chest pa and lateral  Narrative: XR CHEST PA AND LATERAL    INDICATION: R05.1: Acute cough.    COMPARISON: Chest radiograph 4/23/2025    FINDINGS:    Clear lungs. No pneumothorax or pleural effusion.    Normal cardiomediastinal silhouette.    Bones are unremarkable for age.    Cholecystectomy clips.  Impression: No acute cardiopulmonary disease.    Workstation performed: [id number]         "

## 2025-05-17 ENCOUNTER — OFFICE VISIT (OUTPATIENT)
Dept: URGENT CARE | Facility: CLINIC | Age: 45
End: 2025-05-17
Payer: COMMERCIAL

## 2025-05-17 VITALS
WEIGHT: 194.8 LBS | OXYGEN SATURATION: 97 % | HEART RATE: 87 BPM | DIASTOLIC BLOOD PRESSURE: 90 MMHG | BODY MASS INDEX: 38.04 KG/M2 | RESPIRATION RATE: 18 BRPM | TEMPERATURE: 97.3 F | SYSTOLIC BLOOD PRESSURE: 135 MMHG

## 2025-05-17 DIAGNOSIS — B37.0 ORAL CANDIDIASIS: Primary | ICD-10-CM

## 2025-05-17 PROCEDURE — S9088 SERVICES PROVIDED IN URGENT: HCPCS

## 2025-05-17 PROCEDURE — 99213 OFFICE O/P EST LOW 20 MIN: CPT

## 2025-05-17 RX ORDER — NYSTATIN 100000 [USP'U]/ML
500000 SUSPENSION ORAL 4 TIMES DAILY
Qty: 200 ML | Refills: 0 | Status: SHIPPED | OUTPATIENT
Start: 2025-05-17 | End: 2025-05-27

## 2025-05-17 RX ORDER — FLUCONAZOLE 150 MG/1
150 TABLET ORAL ONCE
Qty: 1 TABLET | Refills: 0 | Status: SHIPPED | OUTPATIENT
Start: 2025-05-17 | End: 2025-05-17

## 2025-05-17 NOTE — PATIENT INSTRUCTIONS
Please use Nystatin as directed four times daily for 7-10 days.  May take single dose of Diflucan to prevent yeast infection.     Please practice good oral hygiene.  Brush your teeth twice a day and floss daily.  Brush your gums/tongue with a soft toothbrush.  Rinse your mouth after eating or taking medications. Eating yogurt containing live cultures may restore healthy balance of bacteria in your mouth.    If your symptoms do not improve with our current treatment plan or worsen, please schedule an appointment with your PCP. If you develop any high or persistent fevers, chest pain, palpitations, shortness of breath, difficulty swallowing, decreased urine output, abdominal pain, dizziness or any other concerning symptoms, please proceed to the ED.

## 2025-05-17 NOTE — PROGRESS NOTES
[facility]        NAME: Marilin Morgan is a 44 y.o. female  : 1980    MRN: [id number]  DATE: May 17, 2025  TIME: 11:09 AM    Assessment and Plan   Oral candidiasis [B37.0]  1. Oral candidiasis  nystatin (MYCOSTATIN) 500,000 units/5 mL suspension    fluconazole (DIFLUCAN) 150 mg tablet        Discussed with patient symptoms appear to be oral candidiasis most likely caused by recent antibiotic use.  Will treat with nystatin.  Will also send one-time dose of Diflucan for yeast infection prevention.  Recommend supportive care.  Discussed the importance of good oral hygiene. Instructed patient to follow-up with PCP for no improvement or worsening of symptoms.  Patient educated on red flag symptoms and when to proceed to the ED.  Patient agreeable and understands current treatment plan.     Patient Instructions     Patient Instructions   Please use Nystatin as directed four times daily for 7-10 days.  May take single dose of Diflucan to prevent yeast infection.     Please practice good oral hygiene.  Brush your teeth twice a day and floss daily.  Brush your gums/tongue with a soft toothbrush.  Rinse your mouth after eating or taking medications. Eating yogurt containing live cultures may restore healthy balance of bacteria in your mouth.    If your symptoms do not improve with our current treatment plan or worsen, please schedule an appointment with your PCP. If you develop any high or persistent fevers, chest pain, palpitations, shortness of breath, difficulty swallowing, decreased urine output, abdominal pain, dizziness or any other concerning symptoms, please proceed to the ED.     Follow up with PCP in 3-5 days.  Proceed to  ER if symptoms worsen.    Chief Complaint     Chief Complaint   Patient presents with   • Thrush     Started 1-2 days ago with white patches on tongue, sore throat and hoarseness.  Just finished Amoxicillin and zithromax recently.  Rinsing with apple cidar vinegar and coconut  oil         History of Present Illness       44-year-old female presents to the clinic for evaluation of white patches on her tongue x 2 days.  Patient reports she thought she burned her tongue around 2 days ago.  She noticed last night there was white patches on her tongue that she was unable to scrape off when brushing her teeth.  She reports her mouth and throat feels irritated.  She does report a dry cough however denies any other symptoms at this time.  Of note, she was recently seen at urgent care on 5/7/2025 and treated with antibiotics for bronchitis.  She does also have a history of Crohn's disease.            Review of Systems   Review of Systems   Constitutional:  Negative for chills and fever.   HENT:  Positive for sore throat. Negative for congestion and ear pain.         White patches on tongue   Respiratory:  Negative for cough and shortness of breath.    Cardiovascular:  Negative for chest pain and palpitations.   Gastrointestinal:  Negative for diarrhea, nausea and vomiting.   Genitourinary:  Negative for decreased urine volume and vaginal discharge.   Neurological:  Negative for dizziness, light-headedness and headaches.         Current Medications     Current Medications[1]    Current Allergies     Allergies as of 05/17/2025   • (No Known Allergies)            The following portions of the patient's history were reviewed and updated as appropriate: allergies, current medications, past family history, past medical history, past social history, past surgical history and problem list.     Past Medical History:   Diagnosis Date   • COVID-19 vaccine administered     J & J in March 2021   • Crohn disease (HCC)    • Hypertension    • IBS (irritable bowel syndrome)    • Inflammatory bowel disease 2003    Crohns dx 11/2020   • Kidney stone 06/2021    Lithotripsy done 7/2021   • Obstruction of right ureteropelvic junction (UPJ) due to stone 6/18/2021   • Obstruction of right ureteropelvic junction due to  stone    • PONV (postoperative nausea and vomiting)     Pt requests to be pre-medicated for N/V prior to sx   • Psoriatic arthritis (HCC)    • Wears glasses        Past Surgical History:   Procedure Laterality Date   • CHOLECYSTECTOMY     • OK CYSTOURETHROSCOPY W/URETERAL CATHETERIZATION Right 6/18/2021    Procedure: CYSTOSCOPY RETROGRADE PYELOGRAM WITH INSERTION STENT URETERAL;  Surgeon: Oren Jay MD;  Location:  MAIN OR;  Service: Urology   • OK LITHOTRIPSY XTRCORP SHOCK WAVE Right 7/16/2021    Procedure: LITHOTRIPSY EXTRACORPORAL SHOCKWAVE (ESWL);  Surgeon: Oren Jay MD;  Location:  MAIN OR;  Service: Urology   • TONSILLECTOMY         Family History   Problem Relation Age of Onset   • Diabetes Mother    • Hypertension Father    • Heart attack Father    • No Known Problems Maternal Grandmother    • No Known Problems Maternal Grandfather    • No Known Problems Paternal Grandmother    • No Known Problems Paternal Grandfather    • No Known Problems Maternal Aunt    • No Known Problems Maternal Aunt    • Breast cancer Paternal Aunt 41         Medications have been verified.        Objective   /90 (BP Location: Right arm)   Pulse 87   Temp (!) 97.3 °F (36.3 °C) (Skin)   Resp 18   Wt 88.4 kg (194 lb 12.8 oz)   SpO2 97%   BMI 38.04 kg/m²        Physical Exam     Physical Exam  Vitals and nursing note reviewed.   Constitutional:       Appearance: Normal appearance.   HENT:      Head: Normocephalic and atraumatic.      Mouth/Throat:      Lips: No lesions.      Mouth: Mucous membranes are moist. No oral lesions.      Tongue: Lesions present.      Pharynx: Oropharynx is clear. Uvula midline. No pharyngeal swelling, oropharyngeal exudate, posterior oropharyngeal erythema or uvula swelling.      Tonsils: No tonsillar exudate or tonsillar abscesses.      Comments: Mild, multiple white patches noted to the patient's tongue; patches are unable to be scraped off with a tongue depressor; most consistent with  oral candidiasis    Cardiovascular:      Rate and Rhythm: Normal rate and regular rhythm.      Heart sounds: Normal heart sounds.   Pulmonary:      Effort: Pulmonary effort is normal.      Breath sounds: Normal breath sounds.     Skin:     General: Skin is warm and dry.     Neurological:      General: No focal deficit present.      Mental Status: She is alert.     Psychiatric:         Mood and Affect: Mood normal.         Behavior: Behavior normal.                          [1]    Current Outpatient Medications:   •  Cholecalciferol (VITAMIN D3 PO), Take 4,000 Units by mouth, Disp: , Rfl:   •  cyanocobalamin (VITAMIN B-12) 100 mcg tablet, Take 100 mcg by mouth in the morning., Disp: , Rfl:   •  DULoxetine (CYMBALTA) 30 mg delayed release capsule, Take 30 mg by mouth in the morning., Disp: , Rfl:   •  ELDERBERRY PO, Take by mouth, Disp: , Rfl:   •  fluconazole (DIFLUCAN) 150 mg tablet, Take 1 tablet (150 mg total) by mouth once for 1 dose, Disp: 1 tablet, Rfl: 0  •  fluticasone (FLONASE) 50 mcg/act nasal spray, 2 sprays into each nostril daily, Disp: 16 g, Rfl: 3  •  lisinopril (ZESTRIL) 40 mg tablet, Take 1 tablet (40 mg total) by mouth daily, Disp: 30 tablet, Rfl: 5  •  metoprolol succinate (TOPROL-XL) 50 mg 24 hr tablet, Take 1 tablet (50 mg total) by mouth daily, Disp: 90 tablet, Rfl: 1  •  Mibelas 24 Fe 1-20 MG-MCG(24) CHEW, , Disp: , Rfl:   •  Multiple Vitamin (multivitamin) tablet, Take 1 tablet by mouth in the morning., Disp: , Rfl:   •  nystatin (MYCOSTATIN) 500,000 units/5 mL suspension, Apply 5 mL (500,000 Units total) to the mouth or throat 4 (four) times a day for 10 days, Disp: 200 mL, Rfl: 0  •  omeprazole (PriLOSEC) 20 mg delayed release capsule, in the morning., Disp: , Rfl:   •  Risankizumab-rzaa (SKYRIZI IV), Inject as directed, Disp: , Rfl:   •  sucralfate (CARAFATE) 1 g tablet, in the morning and in the evening., Disp: , Rfl:   •  albuterol (ProAir HFA) 90 mcg/act inhaler, Inhale 2 puffs every 6  (six) hours as needed for wheezing (Patient not taking: Reported on 5/17/2025), Disp: 8.5 g, Rfl: 0  •  benzonatate (TESSALON PERLES) 100 mg capsule, Take 1 capsule (100 mg total) by mouth 3 (three) times a day as needed for cough (Patient not taking: Reported on 5/17/2025), Disp: 20 capsule, Rfl: 0  •  benzonatate (TESSALON) 200 MG capsule, Take 1 capsule (200 mg total) by mouth 3 (three) times a day as needed for cough (Patient not taking: Reported on 5/17/2025), Disp: 20 capsule, Rfl: 0  •  fluticasone (FLONASE) 50 mcg/act nasal spray, 2 sprays into each nostril daily (Patient not taking: Reported on 5/17/2025), Disp: 15.8 mL, Rfl: 0  •  ondansetron (ZOFRAN) 4 mg tablet, Take 1 tablet (4 mg total) by mouth every 8 (eight) hours as needed for nausea or vomiting (Patient not taking: Reported on 5/7/2025), Disp: 20 tablet, Rfl: 0  •  predniSONE 10 mg tablet, Take 3 tablets BID x 2 days, Take 2 tablets BID x 2 days, Take 1 tablet BID x 2 days, Take 1 tablet daily x 2 days (Patient not taking: Reported on 5/17/2025), Disp: 26 tablet, Rfl: 0  •  Skyrizi 360 MG/2.4ML SOCT, , Disp: , Rfl:

## 2025-05-22 ENCOUNTER — HOSPITAL ENCOUNTER (OUTPATIENT)
Dept: NON INVASIVE DIAGNOSTICS | Facility: CLINIC | Age: 45
Discharge: HOME | End: 2025-05-22
Attending: INTERNAL MEDICINE
Payer: COMMERCIAL

## 2025-05-22 ENCOUNTER — RESULTS FOLLOW-UP (OUTPATIENT)
Dept: CARDIOLOGY CLINIC | Facility: CLINIC | Age: 45
End: 2025-05-22

## 2025-05-22 VITALS
OXYGEN SATURATION: 99 % | BODY MASS INDEX: 38.09 KG/M2 | WEIGHT: 194 LBS | HEIGHT: 60 IN | RESPIRATION RATE: 20 BRPM | SYSTOLIC BLOOD PRESSURE: 136 MMHG | DIASTOLIC BLOOD PRESSURE: 80 MMHG | HEART RATE: 105 BPM

## 2025-05-22 DIAGNOSIS — Z82.49 FAMILY HISTORY OF CABG: ICD-10-CM

## 2025-05-22 DIAGNOSIS — Z82.49 FAMILY HISTORY OF EARLY CAD: ICD-10-CM

## 2025-05-22 LAB
MAX HR PERCENT: 99 %
MAX HR: 173 BPM
RATE PRESSURE PRODUCT: NORMAL
SL CV STRESS RECOVERY BP: NORMAL MMHG
SL CV STRESS RECOVERY HR: 114 BPM
SL CV STRESS RECOVERY O2 SAT: 99 %
SL CV STRESS STAGE REACHED: 3
STRESS ANGINA INDEX: 0
STRESS BASELINE BP: NORMAL MMHG
STRESS BASELINE HR: 105 BPM
STRESS O2 SAT REST: 99 %
STRESS PEAK HR: 173 BPM
STRESS POST ESTIMATED WORKLOAD: 9.3 METS
STRESS POST EXERCISE DUR MIN: 7 MIN
STRESS POST EXERCISE DUR SEC: 30 SEC
STRESS POST O2 SAT PEAK: 99 %
STRESS POST PEAK BP: 180 MMHG

## 2025-05-22 PROCEDURE — 93017 CV STRESS TEST TRACING ONLY: CPT

## 2025-05-22 PROCEDURE — 93018 CV STRESS TEST I&R ONLY: CPT | Performed by: INTERNAL MEDICINE

## 2025-05-22 PROCEDURE — 93016 CV STRESS TEST SUPVJ ONLY: CPT | Performed by: INTERNAL MEDICINE

## 2025-05-23 LAB
CHEST PAIN STATEMENT: NORMAL
MAX DIASTOLIC BP: 94 MMHG
MAX PREDICTED HEART RATE: 174 BPM
PROTOCOL NAME: NORMAL
REASON FOR TERMINATION: NORMAL
STRESS POST EXERCISE DUR MIN: 7 MIN
STRESS POST EXERCISE DUR SEC: 30 SEC
STRESS POST PEAK HR: 173 BPM
STRESS POST PEAK SYSTOLIC BP: 194 MMHG
TARGET HR FORMULA: NORMAL
TEST INDICATION: NORMAL

## 2025-05-24 ENCOUNTER — APPOINTMENT (OUTPATIENT)
Dept: LAB | Facility: HOSPITAL | Age: 45
End: 2025-05-24
Attending: FAMILY MEDICINE
Payer: COMMERCIAL

## 2025-05-24 DIAGNOSIS — I10 PRIMARY HYPERTENSION: ICD-10-CM

## 2025-05-24 DIAGNOSIS — E78.00 HYPERCHOLESTEROLEMIA: ICD-10-CM

## 2025-05-24 DIAGNOSIS — Z82.49 FAMILY HISTORY OF CABG: ICD-10-CM

## 2025-05-24 DIAGNOSIS — E78.5 HYPERLIPIDEMIA, UNSPECIFIED HYPERLIPIDEMIA TYPE: ICD-10-CM

## 2025-05-24 DIAGNOSIS — R00.0 TACHYCARDIA: ICD-10-CM

## 2025-05-24 DIAGNOSIS — Z82.49 FAMILY HISTORY OF EARLY CAD: ICD-10-CM

## 2025-05-24 DIAGNOSIS — K50.819 CROHN'S DISEASE OF SMALL AND LARGE INTESTINES WITH COMPLICATION (HCC): ICD-10-CM

## 2025-05-24 LAB
ALBUMIN SERPL BCG-MCNC: 4 G/DL (ref 3.5–5)
ALP SERPL-CCNC: 47 U/L (ref 34–104)
ALT SERPL W P-5'-P-CCNC: 11 U/L (ref 7–52)
ANION GAP SERPL CALCULATED.3IONS-SCNC: 8 MMOL/L (ref 4–13)
AST SERPL W P-5'-P-CCNC: 10 U/L (ref 13–39)
BASOPHILS # BLD AUTO: 0.07 THOUSANDS/ÂΜL (ref 0–0.1)
BASOPHILS NFR BLD AUTO: 1 % (ref 0–1)
BILIRUB SERPL-MCNC: 0.44 MG/DL (ref 0.2–1)
BUN SERPL-MCNC: 16 MG/DL (ref 5–25)
CALCIUM SERPL-MCNC: 8.8 MG/DL (ref 8.4–10.2)
CHLORIDE SERPL-SCNC: 103 MMOL/L (ref 96–108)
CHOLEST SERPL-MCNC: 205 MG/DL (ref ?–200)
CO2 SERPL-SCNC: 28 MMOL/L (ref 21–32)
CREAT SERPL-MCNC: 0.79 MG/DL (ref 0.6–1.3)
CRP SERPL QL: 8.7 MG/L
EOSINOPHIL # BLD AUTO: 0.16 THOUSAND/ÂΜL (ref 0–0.61)
EOSINOPHIL NFR BLD AUTO: 2 % (ref 0–6)
ERYTHROCYTE [DISTWIDTH] IN BLOOD BY AUTOMATED COUNT: 13.7 % (ref 11.6–15.1)
GFR SERPL CREATININE-BSD FRML MDRD: 90 ML/MIN/1.73SQ M
GLUCOSE P FAST SERPL-MCNC: 105 MG/DL (ref 65–99)
HCT VFR BLD AUTO: 45.3 % (ref 34.8–46.1)
HDLC SERPL-MCNC: 48 MG/DL
HGB BLD-MCNC: 13.9 G/DL (ref 11.5–15.4)
IMM GRANULOCYTES # BLD AUTO: 0.04 THOUSAND/UL (ref 0–0.2)
IMM GRANULOCYTES NFR BLD AUTO: 0 % (ref 0–2)
LDLC SERPL CALC-MCNC: 118 MG/DL (ref 0–100)
LYMPHOCYTES # BLD AUTO: 2.71 THOUSANDS/ÂΜL (ref 0.6–4.47)
LYMPHOCYTES NFR BLD AUTO: 28 % (ref 14–44)
MCH RBC QN AUTO: 27.4 PG (ref 26.8–34.3)
MCHC RBC AUTO-ENTMCNC: 30.7 G/DL (ref 31.4–37.4)
MCV RBC AUTO: 89 FL (ref 82–98)
MONOCYTES # BLD AUTO: 0.74 THOUSAND/ÂΜL (ref 0.17–1.22)
MONOCYTES NFR BLD AUTO: 8 % (ref 4–12)
NEUTROPHILS # BLD AUTO: 6.15 THOUSANDS/ÂΜL (ref 1.85–7.62)
NEUTS SEG NFR BLD AUTO: 61 % (ref 43–75)
NONHDLC SERPL-MCNC: 157 MG/DL
NRBC BLD AUTO-RTO: 0 /100 WBCS
PLATELET # BLD AUTO: 399 THOUSANDS/UL (ref 149–390)
PMV BLD AUTO: 9.7 FL (ref 8.9–12.7)
POTASSIUM SERPL-SCNC: 4.1 MMOL/L (ref 3.5–5.3)
PROT SERPL-MCNC: 7.2 G/DL (ref 6.4–8.4)
RBC # BLD AUTO: 5.07 MILLION/UL (ref 3.81–5.12)
SODIUM SERPL-SCNC: 139 MMOL/L (ref 135–147)
TRIGL SERPL-MCNC: 197 MG/DL (ref ?–150)
TSH SERPL DL<=0.05 MIU/L-ACNC: 3.35 UIU/ML (ref 0.45–4.5)
WBC # BLD AUTO: 9.87 THOUSAND/UL (ref 4.31–10.16)

## 2025-05-24 PROCEDURE — 86140 C-REACTIVE PROTEIN: CPT

## 2025-05-24 PROCEDURE — 36415 COLL VENOUS BLD VENIPUNCTURE: CPT

## 2025-06-05 ENCOUNTER — HOSPITAL ENCOUNTER (OUTPATIENT)
Dept: CT IMAGING | Facility: HOSPITAL | Age: 45
Discharge: HOME | End: 2025-06-05
Attending: INTERNAL MEDICINE
Payer: COMMERCIAL

## 2025-06-05 DIAGNOSIS — Z82.49 FAMILY HISTORY OF CABG: ICD-10-CM

## 2025-06-05 DIAGNOSIS — Z82.49 FAMILY HISTORY OF EARLY CAD: ICD-10-CM

## 2025-06-05 PROCEDURE — 75571 CT HRT W/O DYE W/CA TEST: CPT

## 2025-06-06 ENCOUNTER — OFFICE VISIT (OUTPATIENT)
Dept: URGENT CARE | Facility: CLINIC | Age: 45
End: 2025-06-06
Payer: COMMERCIAL

## 2025-06-06 VITALS
TEMPERATURE: 98 F | HEIGHT: 60 IN | SYSTOLIC BLOOD PRESSURE: 118 MMHG | DIASTOLIC BLOOD PRESSURE: 70 MMHG | BODY MASS INDEX: 38.28 KG/M2 | OXYGEN SATURATION: 99 % | WEIGHT: 195 LBS | HEART RATE: 100 BPM | RESPIRATION RATE: 18 BRPM

## 2025-06-06 DIAGNOSIS — K12.0 APHTHOUS ULCER OF MOUTH: ICD-10-CM

## 2025-06-06 DIAGNOSIS — K12.0 APHTHOUS ULCER OF MOUTH: Primary | ICD-10-CM

## 2025-06-06 PROCEDURE — S9088 SERVICES PROVIDED IN URGENT: HCPCS | Performed by: PHYSICAL MEDICINE & REHABILITATION

## 2025-06-06 PROCEDURE — 99213 OFFICE O/P EST LOW 20 MIN: CPT | Performed by: PHYSICAL MEDICINE & REHABILITATION

## 2025-06-06 RX ORDER — DEXAMETHASONE 0.5 MG/5ML
2 ELIXIR ORAL 2 TIMES DAILY
Qty: 237 ML | Refills: 0 | Status: SHIPPED | OUTPATIENT
Start: 2025-06-06 | End: 2025-06-06

## 2025-06-06 RX ORDER — DEXAMETHASONE 0.5 MG/5ML
2 ELIXIR ORAL 2 TIMES DAILY
Qty: 237 ML | Refills: 0 | Status: SHIPPED | OUTPATIENT
Start: 2025-06-06 | End: 2025-06-06 | Stop reason: CLARIF

## 2025-06-06 RX ORDER — DIPHENHYDRAMINE HYDROCHLORIDE AND LIDOCAINE HYDROCHLORIDE AND ALUMINUM HYDROXIDE AND MAGNESIUM HYDRO
10 KIT EVERY 4 HOURS PRN
Qty: 119 ML | Refills: 0 | Status: SHIPPED | OUTPATIENT
Start: 2025-06-06 | End: 2025-06-06

## 2025-06-06 RX ORDER — DIPHENHYDRAMINE HYDROCHLORIDE AND LIDOCAINE HYDROCHLORIDE AND ALUMINUM HYDROXIDE AND MAGNESIUM HYDRO
KIT
Qty: 119 ML | Refills: 0 | Status: SHIPPED | OUTPATIENT
Start: 2025-06-06

## 2025-06-06 RX ORDER — LEVOCETIRIZINE DIHYDROCHLORIDE 5 MG/1
5 TABLET, FILM COATED ORAL EVERY EVENING
COMMUNITY

## 2025-06-06 RX ORDER — DIPHENHYDRAMINE HYDROCHLORIDE AND LIDOCAINE HYDROCHLORIDE AND ALUMINUM HYDROXIDE AND MAGNESIUM HYDRO
10 KIT EVERY 4 HOURS PRN
Qty: 119 ML | Refills: 0 | Status: SHIPPED | OUTPATIENT
Start: 2025-06-06

## 2025-06-06 NOTE — PROGRESS NOTES
[facility]        NAME: Marilin Morgan is a 44 y.o. female  : 1980    MRN: [id number]  DATE: 2025  TIME: 11:22 AM    Assessment and Plan   Aphthous ulcer of mouth [K12.0]  1. Aphthous ulcer of mouth  diphenhydramine, lidocaine, Al/Mg hydroxide, simethicone (Magic Mouthwash) SUSP    dexamethasone 0.5 MG/5ML elixir    DISCONTINUED: dexamethasone (DECADRON) 1 MG/ML solution            Patient Instructions       Follow up with PCP in 3-5 days.  Proceed to  ER if symptoms worsen.    If tests are performed, our office will contact you with results only if changes need to made to the care plan discussed with you at the visit. [facility]    Chief Complaint     Chief Complaint   Patient presents with    Oral Pain     Mouth ulcers on left side for 2 days, left side of face is swollen         History of Present Illness       Pt is a 44 year old female presenting with mouth ulcers to the left inner mouth that were noticed on 25. Now the left side of the face is swollen.    Oral Pain         Review of Systems   Review of Systems   Constitutional: Negative.    HENT:  Positive for dental problem and facial swelling.    Respiratory: Negative.     Cardiovascular: Negative.          Current Medications     Current Medications[1]    Current Allergies     Allergies as of 2025    (No Known Allergies)            The following portions of the patient's history were reviewed and updated as appropriate: allergies, current medications, past family history, past medical history, past social history, past surgical history and problem list.     Past Medical History[2]    Past Surgical History[3]    Family History[4]      Medications have been verified.        Objective   /70   Pulse 100   Temp 98 °F (36.7 °C) (Temporal)   Resp 18   Ht 5' (1.524 m)   Wt 88.5 kg (195 lb)   SpO2 99%   BMI 38.08 kg/m²        Physical Exam     Physical Exam  Vitals  reviewed.   Constitutional:       General: She is not in acute distress.     Appearance: She is not ill-appearing.   HENT:      Mouth/Throat:      Mouth: Mucous membranes are moist.      Pharynx: Oropharynx is clear.      Comments: Leukoplakia left upper gum line to anterior gumline   Left sided facial swelling distal to the eye, lateral to left nares    Cardiovascular:      Rate and Rhythm: Normal rate and regular rhythm.      Pulses: Normal pulses.      Heart sounds: Normal heart sounds.   Pulmonary:      Effort: Pulmonary effort is normal.      Breath sounds: Normal breath sounds.     Neurological:      Mental Status: She is alert.                        [1]   Current Outpatient Medications:     Cholecalciferol (VITAMIN D3 PO), Take 4,000 Units by mouth, Disp: , Rfl:     dexamethasone 0.5 MG/5ML elixir, Take 20 mL (2 mg total) by mouth 2 (two) times a day, Disp: 237 mL, Rfl: 0    diphenhydramine, lidocaine, Al/Mg hydroxide, simethicone (Magic Mouthwash) SUSP, Swish and spit 10 mL every 4 (four) hours as needed for mouth pain or discomfort, Disp: 119 mL, Rfl: 0    DULoxetine (CYMBALTA) 30 mg delayed release capsule, Take 30 mg by mouth in the morning., Disp: , Rfl:     fluticasone (FLONASE) 50 mcg/act nasal spray, 2 sprays into each nostril daily, Disp: 16 g, Rfl: 3    levocetirizine (XYZAL) 5 MG tablet, Take 5 mg by mouth every evening, Disp: , Rfl:     lisinopril (ZESTRIL) 40 mg tablet, Take 1 tablet (40 mg total) by mouth daily, Disp: 30 tablet, Rfl: 5    metoprolol succinate (TOPROL-XL) 50 mg 24 hr tablet, Take 1 tablet (50 mg total) by mouth daily, Disp: 90 tablet, Rfl: 1    Mibelas 24 Fe 1-20 MG-MCG(24) CHEW, , Disp: , Rfl:     Multiple Vitamin (multivitamin) tablet, Take 1 tablet by mouth in the morning., Disp: , Rfl:     omeprazole (PriLOSEC) 20 mg delayed release capsule, in the morning., Disp: , Rfl:     ondansetron (ZOFRAN) 4 mg tablet, Take 1 tablet (4 mg total) by mouth every 8 (eight) hours as needed  for nausea or vomiting, Disp: 20 tablet, Rfl: 0    Risankizumab-rzaa (SKYRIZI IV), Inject as directed, Disp: , Rfl:     Skyrizi 360 MG/2.4ML SOCT, , Disp: , Rfl:     sucralfate (CARAFATE) 1 g tablet, in the morning and in the evening., Disp: , Rfl:     albuterol (ProAir HFA) 90 mcg/act inhaler, Inhale 2 puffs every 6 (six) hours as needed for wheezing (Patient not taking: Reported on 5/17/2025), Disp: 8.5 g, Rfl: 0    benzonatate (TESSALON PERLES) 100 mg capsule, Take 1 capsule (100 mg total) by mouth 3 (three) times a day as needed for cough (Patient not taking: Reported on 5/17/2025), Disp: 20 capsule, Rfl: 0    benzonatate (TESSALON) 200 MG capsule, Take 1 capsule (200 mg total) by mouth 3 (three) times a day as needed for cough (Patient not taking: Reported on 5/17/2025), Disp: 20 capsule, Rfl: 0    cyanocobalamin (VITAMIN B-12) 100 mcg tablet, Take 100 mcg by mouth in the morning., Disp: , Rfl:     ELDERBERRY PO, Take by mouth (Patient not taking: Reported on 6/6/2025), Disp: , Rfl:     fluticasone (FLONASE) 50 mcg/act nasal spray, 2 sprays into each nostril daily (Patient not taking: Reported on 6/6/2025), Disp: 15.8 mL, Rfl: 0    predniSONE 10 mg tablet, Take 3 tablets BID x 2 days, Take 2 tablets BID x 2 days, Take 1 tablet BID x 2 days, Take 1 tablet daily x 2 days (Patient not taking: Reported on 5/17/2025), Disp: 26 tablet, Rfl: 0  [2]   Past Medical History:  Diagnosis Date    COVID-19 vaccine administered     J [employer] [facility] in March 2021    Crohn disease (HCC)     Hypertension     IBS (irritable bowel syndrome)     Inflammatory bowel disease 2003    Crohns dx 11/2020    Kidney stone 06/2021    Lithotripsy done 7/2021    Obstruction of right ureteropelvic junction (UPJ) due to stone 06/18/2021    Obstruction of right ureteropelvic junction due to stone     PONV (postoperative nausea and vomiting)     Pt requests to be pre-medicated for N/V prior to sx    Psoriatic arthritis (HCC)     Tachycardia     Wears glasses     [3]   Past Surgical History:  Procedure Laterality Date    CHOLECYSTECTOMY      NV CYSTOURETHROSCOPY W/URETERAL CATHETERIZATION Right 6/18/2021    Procedure: CYSTOSCOPY RETROGRADE PYELOGRAM WITH INSERTION STENT URETERAL;  Surgeon: Oren Jay MD;  Location:  MAIN OR;  Service: Urology    NV LITHOTRIPSY XTRCORP SHOCK WAVE Right 7/16/2021    Procedure: LITHOTRIPSY EXTRACORPORAL SHOCKWAVE (ESWL);  Surgeon: Oren Jay MD;  Location:  MAIN OR;  Service: Urology    TONSILLECTOMY     [4]   Family History  Problem Relation Name Age of Onset    Diabetes Mother melina     Hypertension Father Chaitanya     Heart attack Father Chaitanya     No Known Problems Maternal Grandmother      No Known Problems Maternal Grandfather      No Known Problems Paternal Grandmother      No Known Problems Paternal Grandfather      No Known Problems Maternal Aunt ghassan     No Known Problems Maternal Aunt marge     Breast cancer Paternal Aunt Priscilla 41

## 2025-07-26 ENCOUNTER — HOSPITAL ENCOUNTER (OUTPATIENT)
Dept: ULTRASOUND IMAGING | Facility: HOSPITAL | Age: 45
Discharge: HOME | End: 2025-07-26
Attending: SPECIALIST
Payer: COMMERCIAL

## 2025-07-26 DIAGNOSIS — R10.2 PELVIC AND PERINEAL PAIN: ICD-10-CM

## 2025-07-26 PROCEDURE — 76856 US EXAM PELVIC COMPLETE: CPT

## 2025-07-26 PROCEDURE — 76830 TRANSVAGINAL US NON-OB: CPT

## 2025-08-15 ENCOUNTER — APPOINTMENT (OUTPATIENT)
Age: 45
End: 2025-08-15
Payer: COMMERCIAL

## 2025-08-15 DIAGNOSIS — I10 PRIMARY HYPERTENSION: ICD-10-CM

## 2025-08-15 LAB
CHOLEST SERPL-MCNC: 214 MG/DL (ref ?–200)
HDLC SERPL-MCNC: 48 MG/DL
LDLC SERPL CALC-MCNC: 129 MG/DL (ref 0–100)
TRIGL SERPL-MCNC: 186 MG/DL (ref ?–150)

## 2025-08-15 PROCEDURE — 80061 LIPID PANEL: CPT

## (undated) DEVICE — SYRINGE 20ML LL

## (undated) DEVICE — MEDI-VAC NON-CONDUCTIVE SUCTION TUBING 6MM X 1.8M (6FT.) L: Brand: CARDINAL HEALTH

## (undated) DEVICE — GLOVE SRG BIOGEL 7.5

## (undated) DEVICE — BAG DRAINAGE UROCATCHER 4 SKYTRON

## (undated) DEVICE — PACK CUSTOM GU CYSTO PACK RF

## (undated) DEVICE — TOWEL SURG XR DETECT GREEN STRL RFD

## (undated) DEVICE — CATH URETERAL 5FR X 70 CM FLEX TIP POLYUR BARD

## (undated) DEVICE — STERILE SURGICAL LUBRICANT,  TUBE: Brand: SURGILUBE

## (undated) DEVICE — GUIDEWIRE STRGHT TIP 0.038 IN SOLO PLUS

## (undated) DEVICE — DRAPE C-ARM X-RAY

## (undated) DEVICE — SPECIMEN CONTAINER STERILE PEEL PACK

## (undated) DEVICE — 4-PORT MANIFOLD: Brand: NEPTUNE 2